# Patient Record
Sex: MALE | Race: OTHER | NOT HISPANIC OR LATINO | URBAN - METROPOLITAN AREA
[De-identification: names, ages, dates, MRNs, and addresses within clinical notes are randomized per-mention and may not be internally consistent; named-entity substitution may affect disease eponyms.]

---

## 2024-01-01 ENCOUNTER — INPATIENT (INPATIENT)
Facility: HOSPITAL | Age: 44
LOS: 12 days | End: 2024-05-10
Attending: INTERNAL MEDICINE | Admitting: INTERNAL MEDICINE
Payer: MEDICAID

## 2024-01-01 VITALS
RESPIRATION RATE: 24 BRPM | HEIGHT: 67 IN | HEART RATE: 94 BPM | SYSTOLIC BLOOD PRESSURE: 150 MMHG | WEIGHT: 203.05 LBS | OXYGEN SATURATION: 99 % | TEMPERATURE: 99 F | DIASTOLIC BLOOD PRESSURE: 60 MMHG

## 2024-01-01 VITALS — OXYGEN SATURATION: 95 % | RESPIRATION RATE: 22 BRPM | HEART RATE: 87 BPM

## 2024-01-01 DIAGNOSIS — N17.9 ACUTE KIDNEY FAILURE, UNSPECIFIED: ICD-10-CM

## 2024-01-01 DIAGNOSIS — E87.1 HYPO-OSMOLALITY AND HYPONATREMIA: ICD-10-CM

## 2024-01-01 DIAGNOSIS — Z92.81 PERSONAL HISTORY OF EXTRACORPOREAL MEMBRANE OXYGENATION (ECMO): ICD-10-CM

## 2024-01-01 DIAGNOSIS — K13.79 OTHER LESIONS OF ORAL MUCOSA: ICD-10-CM

## 2024-01-01 LAB
A1C WITH ESTIMATED AVERAGE GLUCOSE RESULT: 4.5 % — SIGNIFICANT CHANGE UP (ref 4–5.6)
ADD ON TEST-SPECIMEN IN LAB: SIGNIFICANT CHANGE UP
AFP-TM SERPL-MCNC: 4.3 NG/ML — SIGNIFICANT CHANGE UP
ALBUMIN SERPL ELPH-MCNC: 2.6 G/DL — LOW (ref 3.3–5)
ALBUMIN SERPL ELPH-MCNC: 2.9 G/DL — LOW (ref 3.3–5)
ALBUMIN SERPL ELPH-MCNC: 3 G/DL — LOW (ref 3.3–5)
ALBUMIN SERPL ELPH-MCNC: 3.1 G/DL — LOW (ref 3.3–5)
ALBUMIN SERPL ELPH-MCNC: 3.2 G/DL — LOW (ref 3.3–5)
ALBUMIN SERPL ELPH-MCNC: 3.3 G/DL — SIGNIFICANT CHANGE UP (ref 3.3–5)
ALBUMIN SERPL ELPH-MCNC: 3.4 G/DL — SIGNIFICANT CHANGE UP (ref 3.3–5)
ALBUMIN SERPL ELPH-MCNC: 3.5 G/DL — SIGNIFICANT CHANGE UP (ref 3.3–5)
ALBUMIN SERPL ELPH-MCNC: 3.6 G/DL — SIGNIFICANT CHANGE UP (ref 3.3–5)
ALBUMIN SERPL ELPH-MCNC: 3.7 G/DL — SIGNIFICANT CHANGE UP (ref 3.3–5)
ALBUMIN SERPL ELPH-MCNC: 3.8 G/DL — SIGNIFICANT CHANGE UP (ref 3.3–5)
ALBUMIN SERPL ELPH-MCNC: 3.8 G/DL — SIGNIFICANT CHANGE UP (ref 3.3–5)
ALBUMIN SERPL ELPH-MCNC: 3.9 G/DL — SIGNIFICANT CHANGE UP (ref 3.3–5)
ALBUMIN SERPL ELPH-MCNC: 4 G/DL — SIGNIFICANT CHANGE UP (ref 3.3–5)
ALBUMIN SERPL ELPH-MCNC: 4.1 G/DL — SIGNIFICANT CHANGE UP (ref 3.3–5)
ALP SERPL-CCNC: 101 U/L — SIGNIFICANT CHANGE UP (ref 40–120)
ALP SERPL-CCNC: 28 U/L — LOW (ref 40–120)
ALP SERPL-CCNC: 29 U/L — LOW (ref 40–120)
ALP SERPL-CCNC: 31 U/L — LOW (ref 40–120)
ALP SERPL-CCNC: 32 U/L — LOW (ref 40–120)
ALP SERPL-CCNC: 35 U/L — LOW (ref 40–120)
ALP SERPL-CCNC: 38 U/L — LOW (ref 40–120)
ALP SERPL-CCNC: 39 U/L — LOW (ref 40–120)
ALP SERPL-CCNC: 41 U/L — SIGNIFICANT CHANGE UP (ref 40–120)
ALP SERPL-CCNC: 41 U/L — SIGNIFICANT CHANGE UP (ref 40–120)
ALP SERPL-CCNC: 43 U/L — SIGNIFICANT CHANGE UP (ref 40–120)
ALP SERPL-CCNC: 45 U/L — SIGNIFICANT CHANGE UP (ref 40–120)
ALP SERPL-CCNC: 46 U/L — SIGNIFICANT CHANGE UP (ref 40–120)
ALP SERPL-CCNC: 51 U/L — SIGNIFICANT CHANGE UP (ref 40–120)
ALP SERPL-CCNC: 55 U/L — SIGNIFICANT CHANGE UP (ref 40–120)
ALP SERPL-CCNC: 58 U/L — SIGNIFICANT CHANGE UP (ref 40–120)
ALP SERPL-CCNC: 59 U/L — SIGNIFICANT CHANGE UP (ref 40–120)
ALP SERPL-CCNC: 59 U/L — SIGNIFICANT CHANGE UP (ref 40–120)
ALP SERPL-CCNC: 60 U/L — SIGNIFICANT CHANGE UP (ref 40–120)
ALP SERPL-CCNC: 63 U/L — SIGNIFICANT CHANGE UP (ref 40–120)
ALP SERPL-CCNC: 63 U/L — SIGNIFICANT CHANGE UP (ref 40–120)
ALP SERPL-CCNC: 64 U/L — SIGNIFICANT CHANGE UP (ref 40–120)
ALP SERPL-CCNC: 65 U/L — SIGNIFICANT CHANGE UP (ref 40–120)
ALP SERPL-CCNC: 67 U/L — SIGNIFICANT CHANGE UP (ref 40–120)
ALP SERPL-CCNC: 68 U/L — SIGNIFICANT CHANGE UP (ref 40–120)
ALP SERPL-CCNC: 69 U/L — SIGNIFICANT CHANGE UP (ref 40–120)
ALP SERPL-CCNC: 70 U/L — SIGNIFICANT CHANGE UP (ref 40–120)
ALP SERPL-CCNC: 70 U/L — SIGNIFICANT CHANGE UP (ref 40–120)
ALP SERPL-CCNC: 72 U/L — SIGNIFICANT CHANGE UP (ref 40–120)
ALP SERPL-CCNC: 73 U/L — SIGNIFICANT CHANGE UP (ref 40–120)
ALP SERPL-CCNC: 74 U/L — SIGNIFICANT CHANGE UP (ref 40–120)
ALP SERPL-CCNC: 74 U/L — SIGNIFICANT CHANGE UP (ref 40–120)
ALP SERPL-CCNC: 75 U/L — SIGNIFICANT CHANGE UP (ref 40–120)
ALP SERPL-CCNC: 75 U/L — SIGNIFICANT CHANGE UP (ref 40–120)
ALP SERPL-CCNC: 76 U/L — SIGNIFICANT CHANGE UP (ref 40–120)
ALP SERPL-CCNC: 76 U/L — SIGNIFICANT CHANGE UP (ref 40–120)
ALP SERPL-CCNC: 77 U/L — SIGNIFICANT CHANGE UP (ref 40–120)
ALP SERPL-CCNC: 89 U/L — SIGNIFICANT CHANGE UP (ref 40–120)
ALP SERPL-CCNC: 93 U/L — SIGNIFICANT CHANGE UP (ref 40–120)
ALT FLD-CCNC: 100 U/L — HIGH (ref 4–41)
ALT FLD-CCNC: 100 U/L — HIGH (ref 4–41)
ALT FLD-CCNC: 101 U/L — HIGH (ref 4–41)
ALT FLD-CCNC: 101 U/L — HIGH (ref 4–41)
ALT FLD-CCNC: 102 U/L — HIGH (ref 4–41)
ALT FLD-CCNC: 102 U/L — HIGH (ref 4–41)
ALT FLD-CCNC: 104 U/L — HIGH (ref 4–41)
ALT FLD-CCNC: 104 U/L — HIGH (ref 4–41)
ALT FLD-CCNC: 106 U/L — HIGH (ref 4–41)
ALT FLD-CCNC: 107 U/L — HIGH (ref 4–41)
ALT FLD-CCNC: 115 U/L — HIGH (ref 4–41)
ALT FLD-CCNC: 116 U/L — HIGH (ref 4–41)
ALT FLD-CCNC: 117 U/L — HIGH (ref 4–41)
ALT FLD-CCNC: 121 U/L — HIGH (ref 4–41)
ALT FLD-CCNC: 122 U/L — HIGH (ref 4–41)
ALT FLD-CCNC: 123 U/L — HIGH (ref 4–41)
ALT FLD-CCNC: 125 U/L — HIGH (ref 4–41)
ALT FLD-CCNC: 126 U/L — HIGH (ref 4–41)
ALT FLD-CCNC: 127 U/L — HIGH (ref 4–41)
ALT FLD-CCNC: 130 U/L — HIGH (ref 4–41)
ALT FLD-CCNC: 134 U/L — HIGH (ref 4–41)
ALT FLD-CCNC: 135 U/L — HIGH (ref 4–41)
ALT FLD-CCNC: 32 U/L — SIGNIFICANT CHANGE UP (ref 4–41)
ALT FLD-CCNC: 34 U/L — SIGNIFICANT CHANGE UP (ref 4–41)
ALT FLD-CCNC: 35 U/L — SIGNIFICANT CHANGE UP (ref 4–41)
ALT FLD-CCNC: 37 U/L — SIGNIFICANT CHANGE UP (ref 4–41)
ALT FLD-CCNC: 38 U/L — SIGNIFICANT CHANGE UP (ref 4–41)
ALT FLD-CCNC: 38 U/L — SIGNIFICANT CHANGE UP (ref 4–41)
ALT FLD-CCNC: 39 U/L — SIGNIFICANT CHANGE UP (ref 4–41)
ALT FLD-CCNC: 40 U/L — SIGNIFICANT CHANGE UP (ref 4–41)
ALT FLD-CCNC: 41 U/L — SIGNIFICANT CHANGE UP (ref 4–41)
ALT FLD-CCNC: 42 U/L — HIGH (ref 4–41)
ALT FLD-CCNC: 42 U/L — HIGH (ref 4–41)
ALT FLD-CCNC: 43 U/L — HIGH (ref 4–41)
ALT FLD-CCNC: 47 U/L — HIGH (ref 4–41)
ALT FLD-CCNC: 50 U/L — HIGH (ref 4–41)
ALT FLD-CCNC: 55 U/L — HIGH (ref 4–41)
ALT FLD-CCNC: 59 U/L — HIGH (ref 4–41)
ALT FLD-CCNC: 63 U/L — HIGH (ref 4–41)
ALT FLD-CCNC: 71 U/L — HIGH (ref 4–41)
ALT FLD-CCNC: 76 U/L — HIGH (ref 4–41)
ALT FLD-CCNC: 86 U/L — HIGH (ref 4–41)
ALT FLD-CCNC: 96 U/L — HIGH (ref 4–41)
ALT FLD-CCNC: 99 U/L — HIGH (ref 4–41)
ALT FLD-CCNC: 99 U/L — HIGH (ref 4–41)
AMMONIA BLD-MCNC: 105 UMOL/L — HIGH (ref 11–55)
AMMONIA BLD-MCNC: 111 UMOL/L — HIGH (ref 11–55)
AMMONIA BLD-MCNC: 48 UMOL/L — SIGNIFICANT CHANGE UP (ref 11–55)
AMMONIA BLD-MCNC: 94 UMOL/L — HIGH (ref 11–55)
AMPHET UR-MCNC: NEGATIVE — SIGNIFICANT CHANGE UP
AMYLASE P1 CFR SERPL: 27 U/L — SIGNIFICANT CHANGE UP (ref 25–125)
AMYLASE P1 CFR SERPL: 28 U/L — SIGNIFICANT CHANGE UP (ref 25–125)
ANA TITR SER: NEGATIVE — SIGNIFICANT CHANGE UP
ANION GAP SERPL CALC-SCNC: 10 MMOL/L — SIGNIFICANT CHANGE UP (ref 7–14)
ANION GAP SERPL CALC-SCNC: 12 MMOL/L — SIGNIFICANT CHANGE UP (ref 7–14)
ANION GAP SERPL CALC-SCNC: 13 MMOL/L — SIGNIFICANT CHANGE UP (ref 7–14)
ANION GAP SERPL CALC-SCNC: 14 MMOL/L — SIGNIFICANT CHANGE UP (ref 7–14)
ANION GAP SERPL CALC-SCNC: 15 MMOL/L — HIGH (ref 7–14)
ANION GAP SERPL CALC-SCNC: 16 MMOL/L — HIGH (ref 7–14)
ANION GAP SERPL CALC-SCNC: 17 MMOL/L — HIGH (ref 7–14)
ANION GAP SERPL CALC-SCNC: 18 MMOL/L — HIGH (ref 7–14)
ANION GAP SERPL CALC-SCNC: 18 MMOL/L — HIGH (ref 7–14)
ANION GAP SERPL CALC-SCNC: 19 MMOL/L — HIGH (ref 7–14)
ANION GAP SERPL CALC-SCNC: 19 MMOL/L — HIGH (ref 7–14)
ANION GAP SERPL CALC-SCNC: 20 MMOL/L — HIGH (ref 7–14)
ANION GAP SERPL CALC-SCNC: 21 MMOL/L — HIGH (ref 7–14)
ANION GAP SERPL CALC-SCNC: 23 MMOL/L — HIGH (ref 7–14)
ANION GAP SERPL CALC-SCNC: 23 MMOL/L — HIGH (ref 7–14)
ANISOCYTOSIS BLD QL: SIGNIFICANT CHANGE UP
APAP SERPL-MCNC: <10 UG/ML — LOW (ref 15–25)
APPEARANCE UR: ABNORMAL
APPEARANCE UR: ABNORMAL
APTT BLD: 31.5 SEC — SIGNIFICANT CHANGE UP (ref 24.5–35.6)
APTT BLD: 33.8 SEC — SIGNIFICANT CHANGE UP (ref 24.5–35.6)
APTT BLD: 34.7 SEC — SIGNIFICANT CHANGE UP (ref 24.5–35.6)
APTT BLD: 35.1 SEC — SIGNIFICANT CHANGE UP (ref 24.5–35.6)
APTT BLD: 35.2 SEC — SIGNIFICANT CHANGE UP (ref 24.5–35.6)
APTT BLD: 35.6 SEC — SIGNIFICANT CHANGE UP (ref 24.5–35.6)
APTT BLD: 36 SEC — HIGH (ref 24.5–35.6)
APTT BLD: 36.4 SEC — HIGH (ref 24.5–35.6)
APTT BLD: 36.4 SEC — HIGH (ref 24.5–35.6)
APTT BLD: 36.8 SEC — HIGH (ref 24.5–35.6)
APTT BLD: 37.4 SEC — HIGH (ref 24.5–35.6)
APTT BLD: 37.5 SEC — HIGH (ref 24.5–35.6)
APTT BLD: 37.7 SEC — HIGH (ref 24.5–35.6)
APTT BLD: 38.3 SEC — HIGH (ref 24.5–35.6)
APTT BLD: 38.3 SEC — HIGH (ref 24.5–35.6)
APTT BLD: 39.3 SEC — HIGH (ref 24.5–35.6)
APTT BLD: 39.7 SEC — HIGH (ref 24.5–35.6)
APTT BLD: 39.8 SEC — HIGH (ref 24.5–35.6)
APTT BLD: 39.9 SEC — HIGH (ref 24.5–35.6)
APTT BLD: 40.1 SEC — HIGH (ref 24.5–35.6)
APTT BLD: 40.6 SEC — HIGH (ref 24.5–35.6)
APTT BLD: 41.1 SEC — HIGH (ref 24.5–35.6)
APTT BLD: 41.3 SEC — HIGH (ref 24.5–35.6)
APTT BLD: 42.3 SEC — HIGH (ref 24.5–35.6)
APTT BLD: 43.2 SEC — HIGH (ref 24.5–35.6)
APTT BLD: 43.4 SEC — HIGH (ref 24.5–35.6)
APTT BLD: 45.2 SEC — HIGH (ref 24.5–35.6)
APTT BLD: 45.3 SEC — HIGH (ref 24.5–35.6)
APTT BLD: 45.9 SEC — HIGH (ref 24.5–35.6)
APTT BLD: 47.2 SEC — HIGH (ref 24.5–35.6)
APTT BLD: 48.9 SEC — HIGH (ref 24.5–35.6)
APTT BLD: 49 SEC — HIGH (ref 24.5–35.6)
APTT BLD: 49 SEC — HIGH (ref 24.5–35.6)
APTT BLD: 51.6 SEC — HIGH (ref 24.5–35.6)
APTT BLD: 52.6 SEC — HIGH (ref 24.5–35.6)
APTT BLD: 54 SEC — HIGH (ref 24.5–35.6)
APTT BLD: 57.5 SEC — HIGH (ref 24.5–35.6)
APTT BLD: 60.5 SEC — HIGH (ref 24.5–35.6)
AST SERPL-CCNC: 123 U/L — HIGH (ref 4–40)
AST SERPL-CCNC: 124 U/L — HIGH (ref 4–40)
AST SERPL-CCNC: 126 U/L — HIGH (ref 4–40)
AST SERPL-CCNC: 127 U/L — HIGH (ref 4–40)
AST SERPL-CCNC: 128 U/L — HIGH (ref 4–40)
AST SERPL-CCNC: 130 U/L — HIGH (ref 4–40)
AST SERPL-CCNC: 130 U/L — HIGH (ref 4–40)
AST SERPL-CCNC: 133 U/L — HIGH (ref 4–40)
AST SERPL-CCNC: 133 U/L — HIGH (ref 4–40)
AST SERPL-CCNC: 135 U/L — HIGH (ref 4–40)
AST SERPL-CCNC: 136 U/L — HIGH (ref 4–40)
AST SERPL-CCNC: 141 U/L — HIGH (ref 4–40)
AST SERPL-CCNC: 142 U/L — HIGH (ref 4–40)
AST SERPL-CCNC: 143 U/L — HIGH (ref 4–40)
AST SERPL-CCNC: 145 U/L — HIGH (ref 4–40)
AST SERPL-CCNC: 152 U/L — HIGH (ref 4–40)
AST SERPL-CCNC: 154 U/L — HIGH (ref 4–40)
AST SERPL-CCNC: 156 U/L — HIGH (ref 4–40)
AST SERPL-CCNC: 159 U/L — HIGH (ref 4–40)
AST SERPL-CCNC: 160 U/L — HIGH (ref 4–40)
AST SERPL-CCNC: 164 U/L — HIGH (ref 4–40)
AST SERPL-CCNC: 165 U/L — HIGH (ref 4–40)
AST SERPL-CCNC: 168 U/L — HIGH (ref 4–40)
AST SERPL-CCNC: 172 U/L — HIGH (ref 4–40)
AST SERPL-CCNC: 173 U/L — HIGH (ref 4–40)
AST SERPL-CCNC: 175 U/L — HIGH (ref 4–40)
AST SERPL-CCNC: 179 U/L — HIGH (ref 4–40)
AST SERPL-CCNC: 181 U/L — HIGH (ref 4–40)
AST SERPL-CCNC: 183 U/L — HIGH (ref 4–40)
AST SERPL-CCNC: 193 U/L — HIGH (ref 4–40)
AST SERPL-CCNC: 194 U/L — HIGH (ref 4–40)
AST SERPL-CCNC: 199 U/L — HIGH (ref 4–40)
AST SERPL-CCNC: 203 U/L — HIGH (ref 4–40)
AST SERPL-CCNC: 206 U/L — HIGH (ref 4–40)
AST SERPL-CCNC: 220 U/L — HIGH (ref 4–40)
AST SERPL-CCNC: 235 U/L — HIGH (ref 4–40)
AST SERPL-CCNC: 257 U/L — HIGH (ref 4–40)
AST SERPL-CCNC: 274 U/L — HIGH (ref 4–40)
AST SERPL-CCNC: 320 U/L — HIGH (ref 4–40)
AST SERPL-CCNC: 326 U/L — HIGH (ref 4–40)
AST SERPL-CCNC: 343 U/L — HIGH (ref 4–40)
AST SERPL-CCNC: 353 U/L — HIGH (ref 4–40)
AST SERPL-CCNC: 358 U/L — HIGH (ref 4–40)
AST SERPL-CCNC: 377 U/L — HIGH (ref 4–40)
AST SERPL-CCNC: 381 U/L — HIGH (ref 4–40)
AST SERPL-CCNC: 388 U/L — HIGH (ref 4–40)
AST SERPL-CCNC: 401 U/L — HIGH (ref 4–40)
AST SERPL-CCNC: 408 U/L — HIGH (ref 4–40)
AUTO DIFF PNL BLD: ABNORMAL
B PERT DNA SPEC QL NAA+PROBE: SIGNIFICANT CHANGE UP
B PERT IGG+IGM PNL SER: ABNORMAL
B PERT+PARAPERT DNA PNL SPEC NAA+PROBE: SIGNIFICANT CHANGE UP
B19V DNA FLD QL NAA+PROBE: SIGNIFICANT CHANGE UP IU/ML
B19V IGG SER-ACNC: 1.13 INDEX — HIGH (ref 0–0.9)
B19V IGG+IGM SER-IMP: POSITIVE
B19V IGG+IGM SER-IMP: SIGNIFICANT CHANGE UP
B19V IGM FLD-ACNC: 0.1 INDEX — SIGNIFICANT CHANGE UP (ref 0–0.9)
B19V IGM SER-ACNC: NEGATIVE — SIGNIFICANT CHANGE UP
B2 GLYCOPROT1 AB SER QL: NEGATIVE — SIGNIFICANT CHANGE UP
BACTERIA # UR AUTO: NEGATIVE /HPF — SIGNIFICANT CHANGE UP
BACTERIA # UR AUTO: NEGATIVE /HPF — SIGNIFICANT CHANGE UP
BARBITURATES UR SCN-MCNC: NEGATIVE — SIGNIFICANT CHANGE UP
BASOPHILS # BLD AUTO: 0 K/UL — SIGNIFICANT CHANGE UP (ref 0–0.2)
BASOPHILS # BLD AUTO: 0 K/UL — SIGNIFICANT CHANGE UP (ref 0–0.2)
BASOPHILS # BLD AUTO: 0.01 K/UL — SIGNIFICANT CHANGE UP (ref 0–0.2)
BASOPHILS # BLD AUTO: 0.02 K/UL — SIGNIFICANT CHANGE UP (ref 0–0.2)
BASOPHILS # BLD AUTO: 0.03 K/UL — SIGNIFICANT CHANGE UP (ref 0–0.2)
BASOPHILS # BLD AUTO: 0.04 K/UL — SIGNIFICANT CHANGE UP (ref 0–0.2)
BASOPHILS # BLD AUTO: 0.05 K/UL — SIGNIFICANT CHANGE UP (ref 0–0.2)
BASOPHILS # BLD AUTO: 0.06 K/UL — SIGNIFICANT CHANGE UP (ref 0–0.2)
BASOPHILS # BLD AUTO: 0.06 K/UL — SIGNIFICANT CHANGE UP (ref 0–0.2)
BASOPHILS # BLD AUTO: 0.08 K/UL — SIGNIFICANT CHANGE UP (ref 0–0.2)
BASOPHILS # BLD AUTO: 0.09 K/UL — SIGNIFICANT CHANGE UP (ref 0–0.2)
BASOPHILS # BLD AUTO: 0.09 K/UL — SIGNIFICANT CHANGE UP (ref 0–0.2)
BASOPHILS # BLD AUTO: 0.1 K/UL — SIGNIFICANT CHANGE UP (ref 0–0.2)
BASOPHILS # BLD AUTO: 0.11 K/UL — SIGNIFICANT CHANGE UP (ref 0–0.2)
BASOPHILS # BLD AUTO: 0.38 K/UL — HIGH (ref 0–0.2)
BASOPHILS NFR BLD AUTO: 0 % — SIGNIFICANT CHANGE UP (ref 0–2)
BASOPHILS NFR BLD AUTO: 0 % — SIGNIFICANT CHANGE UP (ref 0–2)
BASOPHILS NFR BLD AUTO: 0.1 % — SIGNIFICANT CHANGE UP (ref 0–2)
BASOPHILS NFR BLD AUTO: 0.2 % — SIGNIFICANT CHANGE UP (ref 0–2)
BASOPHILS NFR BLD AUTO: 0.3 % — SIGNIFICANT CHANGE UP (ref 0–2)
BASOPHILS NFR BLD AUTO: 0.4 % — SIGNIFICANT CHANGE UP (ref 0–2)
BASOPHILS NFR BLD AUTO: 0.5 % — SIGNIFICANT CHANGE UP (ref 0–2)
BASOPHILS NFR BLD AUTO: 0.6 % — SIGNIFICANT CHANGE UP (ref 0–2)
BASOPHILS NFR BLD AUTO: 0.6 % — SIGNIFICANT CHANGE UP (ref 0–2)
BASOPHILS NFR BLD AUTO: 0.7 % — SIGNIFICANT CHANGE UP (ref 0–2)
BASOPHILS NFR BLD AUTO: 0.7 % — SIGNIFICANT CHANGE UP (ref 0–2)
BASOPHILS NFR BLD AUTO: 0.9 % — SIGNIFICANT CHANGE UP (ref 0–2)
BASOPHILS NFR BLD AUTO: 1.7 % — SIGNIFICANT CHANGE UP (ref 0–2)
BENZODIAZ UR-MCNC: NEGATIVE — SIGNIFICANT CHANGE UP
BILIRUB DIRECT SERPL-MCNC: 11.4 MG/DL — HIGH (ref 0–0.3)
BILIRUB INDIRECT FLD-MCNC: 3.5 MG/DL — HIGH (ref 0–1)
BILIRUB SERPL-MCNC: 14.9 MG/DL — HIGH (ref 0.2–1.2)
BILIRUB SERPL-MCNC: 14.9 MG/DL — HIGH (ref 0.2–1.2)
BILIRUB SERPL-MCNC: 15.4 MG/DL — HIGH (ref 0.2–1.2)
BILIRUB SERPL-MCNC: 15.4 MG/DL — HIGH (ref 0.2–1.2)
BILIRUB SERPL-MCNC: 17.3 MG/DL — HIGH (ref 0.2–1.2)
BILIRUB SERPL-MCNC: 18.4 MG/DL — HIGH (ref 0.2–1.2)
BILIRUB SERPL-MCNC: 18.8 MG/DL — HIGH (ref 0.2–1.2)
BILIRUB SERPL-MCNC: 20.3 MG/DL — HIGH (ref 0.2–1.2)
BILIRUB SERPL-MCNC: 20.4 MG/DL — HIGH (ref 0.2–1.2)
BILIRUB SERPL-MCNC: 21 MG/DL — HIGH (ref 0.2–1.2)
BILIRUB SERPL-MCNC: 21.6 MG/DL — HIGH (ref 0.2–1.2)
BILIRUB SERPL-MCNC: 22 MG/DL — HIGH (ref 0.2–1.2)
BILIRUB SERPL-MCNC: 22.7 MG/DL — HIGH (ref 0.2–1.2)
BILIRUB SERPL-MCNC: 22.8 MG/DL — HIGH (ref 0.2–1.2)
BILIRUB SERPL-MCNC: 22.9 MG/DL — HIGH (ref 0.2–1.2)
BILIRUB SERPL-MCNC: 23.1 MG/DL — HIGH (ref 0.2–1.2)
BILIRUB SERPL-MCNC: 23.3 MG/DL — HIGH (ref 0.2–1.2)
BILIRUB SERPL-MCNC: 23.9 MG/DL — HIGH (ref 0.2–1.2)
BILIRUB SERPL-MCNC: 24.6 MG/DL — HIGH (ref 0.2–1.2)
BILIRUB SERPL-MCNC: 25 MG/DL — HIGH (ref 0.2–1.2)
BILIRUB SERPL-MCNC: 25 MG/DL — HIGH (ref 0.2–1.2)
BILIRUB SERPL-MCNC: 25.1 MG/DL — HIGH (ref 0.2–1.2)
BILIRUB SERPL-MCNC: 26.3 MG/DL — HIGH (ref 0.2–1.2)
BILIRUB SERPL-MCNC: 26.3 MG/DL — HIGH (ref 0.2–1.2)
BILIRUB SERPL-MCNC: 26.8 MG/DL — HIGH (ref 0.2–1.2)
BILIRUB SERPL-MCNC: 27.4 MG/DL — HIGH (ref 0.2–1.2)
BILIRUB SERPL-MCNC: 27.6 MG/DL — HIGH (ref 0.2–1.2)
BILIRUB SERPL-MCNC: 28.1 MG/DL — HIGH (ref 0.2–1.2)
BILIRUB SERPL-MCNC: 28.1 MG/DL — HIGH (ref 0.2–1.2)
BILIRUB SERPL-MCNC: 28.5 MG/DL — HIGH (ref 0.2–1.2)
BILIRUB SERPL-MCNC: 28.6 MG/DL — HIGH (ref 0.2–1.2)
BILIRUB SERPL-MCNC: 28.8 MG/DL — HIGH (ref 0.2–1.2)
BILIRUB SERPL-MCNC: 29.1 MG/DL — HIGH (ref 0.2–1.2)
BILIRUB SERPL-MCNC: 31 MG/DL — HIGH (ref 0.2–1.2)
BILIRUB SERPL-MCNC: 31.3 MG/DL — HIGH (ref 0.2–1.2)
BILIRUB SERPL-MCNC: 31.8 MG/DL — HIGH (ref 0.2–1.2)
BILIRUB SERPL-MCNC: 32.2 MG/DL — HIGH (ref 0.2–1.2)
BILIRUB SERPL-MCNC: 32.8 MG/DL — HIGH (ref 0.2–1.2)
BILIRUB SERPL-MCNC: 33.2 MG/DL — HIGH (ref 0.2–1.2)
BILIRUB SERPL-MCNC: 33.4 MG/DL — HIGH (ref 0.2–1.2)
BILIRUB SERPL-MCNC: 33.8 MG/DL — HIGH (ref 0.2–1.2)
BILIRUB SERPL-MCNC: 33.9 MG/DL — HIGH (ref 0.2–1.2)
BILIRUB SERPL-MCNC: 34.2 MG/DL — HIGH (ref 0.2–1.2)
BILIRUB SERPL-MCNC: 35.4 MG/DL — HIGH (ref 0.2–1.2)
BILIRUB SERPL-MCNC: 36.2 MG/DL — HIGH (ref 0.2–1.2)
BILIRUB SERPL-MCNC: 36.7 MG/DL — HIGH (ref 0.2–1.2)
BILIRUB SERPL-MCNC: 37.8 MG/DL — HIGH (ref 0.2–1.2)
BILIRUB SERPL-MCNC: 38.7 MG/DL — HIGH (ref 0.2–1.2)
BILIRUB SERPL-MCNC: 38.9 MG/DL — HIGH (ref 0.2–1.2)
BILIRUB UR-MCNC: ABNORMAL
BILIRUB UR-MCNC: ABNORMAL
BLD GP AB SCN SERPL QL: NEGATIVE — SIGNIFICANT CHANGE UP
BLOOD GAS ARTERIAL - LYTES,HGB,ICA,LACT RESULT: SIGNIFICANT CHANGE UP
BLOOD GAS ARTERIAL COMPREHENSIVE RESULT: SIGNIFICANT CHANGE UP
BORDETELLA PARAPERTUSSIS (RAPRVP): SIGNIFICANT CHANGE UP
BUN SERPL-MCNC: 10 MG/DL — SIGNIFICANT CHANGE UP (ref 7–23)
BUN SERPL-MCNC: 11 MG/DL — SIGNIFICANT CHANGE UP (ref 7–23)
BUN SERPL-MCNC: 11 MG/DL — SIGNIFICANT CHANGE UP (ref 7–23)
BUN SERPL-MCNC: 13 MG/DL — SIGNIFICANT CHANGE UP (ref 7–23)
BUN SERPL-MCNC: 13 MG/DL — SIGNIFICANT CHANGE UP (ref 7–23)
BUN SERPL-MCNC: 16 MG/DL — SIGNIFICANT CHANGE UP (ref 7–23)
BUN SERPL-MCNC: 16 MG/DL — SIGNIFICANT CHANGE UP (ref 7–23)
BUN SERPL-MCNC: 17 MG/DL — SIGNIFICANT CHANGE UP (ref 7–23)
BUN SERPL-MCNC: 17 MG/DL — SIGNIFICANT CHANGE UP (ref 7–23)
BUN SERPL-MCNC: 18 MG/DL — SIGNIFICANT CHANGE UP (ref 7–23)
BUN SERPL-MCNC: 19 MG/DL — SIGNIFICANT CHANGE UP (ref 7–23)
BUN SERPL-MCNC: 20 MG/DL — SIGNIFICANT CHANGE UP (ref 7–23)
BUN SERPL-MCNC: 21 MG/DL — SIGNIFICANT CHANGE UP (ref 7–23)
BUN SERPL-MCNC: 22 MG/DL — SIGNIFICANT CHANGE UP (ref 7–23)
BUN SERPL-MCNC: 22 MG/DL — SIGNIFICANT CHANGE UP (ref 7–23)
BUN SERPL-MCNC: 23 MG/DL — SIGNIFICANT CHANGE UP (ref 7–23)
BUN SERPL-MCNC: 24 MG/DL — HIGH (ref 7–23)
BUN SERPL-MCNC: 25 MG/DL — HIGH (ref 7–23)
BUN SERPL-MCNC: 26 MG/DL — HIGH (ref 7–23)
BUN SERPL-MCNC: 26 MG/DL — HIGH (ref 7–23)
BUN SERPL-MCNC: 28 MG/DL — HIGH (ref 7–23)
BUN SERPL-MCNC: 30 MG/DL — HIGH (ref 7–23)
BUN SERPL-MCNC: 34 MG/DL — HIGH (ref 7–23)
BUN SERPL-MCNC: 44 MG/DL — HIGH (ref 7–23)
BUN SERPL-MCNC: 46 MG/DL — HIGH (ref 7–23)
BUN SERPL-MCNC: 54 MG/DL — HIGH (ref 7–23)
BUN SERPL-MCNC: 55 MG/DL — HIGH (ref 7–23)
BUN SERPL-MCNC: 59 MG/DL — HIGH (ref 7–23)
BUN SERPL-MCNC: 60 MG/DL — HIGH (ref 7–23)
BUN SERPL-MCNC: 62 MG/DL — HIGH (ref 7–23)
BUN SERPL-MCNC: 65 MG/DL — HIGH (ref 7–23)
BUN SERPL-MCNC: 66 MG/DL — HIGH (ref 7–23)
BUN SERPL-MCNC: 67 MG/DL — HIGH (ref 7–23)
BUN SERPL-MCNC: 70 MG/DL — HIGH (ref 7–23)
BUN SERPL-MCNC: 76 MG/DL — HIGH (ref 7–23)
BUN SERPL-MCNC: 8 MG/DL — SIGNIFICANT CHANGE UP (ref 7–23)
BUN SERPL-MCNC: 8 MG/DL — SIGNIFICANT CHANGE UP (ref 7–23)
BUN SERPL-MCNC: 9 MG/DL — SIGNIFICANT CHANGE UP (ref 7–23)
BURR CELLS BLD QL SMEAR: PRESENT — SIGNIFICANT CHANGE UP
C PNEUM DNA SPEC QL NAA+PROBE: SIGNIFICANT CHANGE UP
C-ANCA SER-ACNC: NEGATIVE — SIGNIFICANT CHANGE UP
C3 SERPL-MCNC: 66 MG/DL — LOW (ref 90–180)
C4 SERPL-MCNC: 10 MG/DL — SIGNIFICANT CHANGE UP (ref 10–40)
C4 SERPL-MCNC: 12 MG/DL — SIGNIFICANT CHANGE UP (ref 10–40)
C4 SERPL-MCNC: 6 MG/DL — LOW (ref 10–40)
C4 SERPL-MCNC: 9 MG/DL — LOW (ref 10–40)
C4 SERPL-MCNC: 9 MG/DL — LOW (ref 10–40)
CA-I BLD-SCNC: 1.08 MMOL/L — LOW (ref 1.15–1.29)
CA-I BLD-SCNC: 1.1 MMOL/L — LOW (ref 1.15–1.29)
CA-I BLD-SCNC: 1.11 MMOL/L — LOW (ref 1.15–1.29)
CA-I BLD-SCNC: 1.12 MMOL/L — LOW (ref 1.15–1.29)
CA-I BLD-SCNC: 1.12 MMOL/L — LOW (ref 1.15–1.29)
CA-I BLD-SCNC: 1.13 MMOL/L — LOW (ref 1.15–1.29)
CA-I BLD-SCNC: 1.14 MMOL/L — LOW (ref 1.15–1.29)
CA-I BLD-SCNC: 1.15 MMOL/L — SIGNIFICANT CHANGE UP (ref 1.15–1.29)
CA-I BLD-SCNC: 1.15 MMOL/L — SIGNIFICANT CHANGE UP (ref 1.15–1.29)
CA-I BLD-SCNC: 1.16 MMOL/L — SIGNIFICANT CHANGE UP (ref 1.15–1.29)
CA-I BLD-SCNC: 1.17 MMOL/L — SIGNIFICANT CHANGE UP (ref 1.15–1.29)
CA-I BLD-SCNC: 1.19 MMOL/L — SIGNIFICANT CHANGE UP (ref 1.15–1.29)
CA-I BLD-SCNC: 1.2 MMOL/L — SIGNIFICANT CHANGE UP (ref 1.15–1.29)
CA-I BLD-SCNC: 1.22 MMOL/L — SIGNIFICANT CHANGE UP (ref 1.15–1.29)
CA-I BLD-SCNC: 1.23 MMOL/L — SIGNIFICANT CHANGE UP (ref 1.15–1.29)
CA-I BLD-SCNC: 1.24 MMOL/L — SIGNIFICANT CHANGE UP (ref 1.15–1.29)
CA-I BLD-SCNC: 1.29 MMOL/L — SIGNIFICANT CHANGE UP (ref 1.15–1.29)
CALCIUM SERPL-MCNC: 7.8 MG/DL — LOW (ref 8.4–10.5)
CALCIUM SERPL-MCNC: 8 MG/DL — LOW (ref 8.4–10.5)
CALCIUM SERPL-MCNC: 8.1 MG/DL — LOW (ref 8.4–10.5)
CALCIUM SERPL-MCNC: 8.2 MG/DL — LOW (ref 8.4–10.5)
CALCIUM SERPL-MCNC: 8.3 MG/DL — LOW (ref 8.4–10.5)
CALCIUM SERPL-MCNC: 8.4 MG/DL — SIGNIFICANT CHANGE UP (ref 8.4–10.5)
CALCIUM SERPL-MCNC: 8.4 MG/DL — SIGNIFICANT CHANGE UP (ref 8.4–10.5)
CALCIUM SERPL-MCNC: 8.5 MG/DL — SIGNIFICANT CHANGE UP (ref 8.4–10.5)
CALCIUM SERPL-MCNC: 8.6 MG/DL — SIGNIFICANT CHANGE UP (ref 8.4–10.5)
CALCIUM SERPL-MCNC: 8.7 MG/DL — SIGNIFICANT CHANGE UP (ref 8.4–10.5)
CALCIUM SERPL-MCNC: 8.8 MG/DL — SIGNIFICANT CHANGE UP (ref 8.4–10.5)
CALCIUM SERPL-MCNC: 8.9 MG/DL — SIGNIFICANT CHANGE UP (ref 8.4–10.5)
CALCIUM SERPL-MCNC: 9 MG/DL — SIGNIFICANT CHANGE UP (ref 8.4–10.5)
CALCIUM SERPL-MCNC: 9 MG/DL — SIGNIFICANT CHANGE UP (ref 8.4–10.5)
CALCIUM SERPL-MCNC: 9.2 MG/DL — SIGNIFICANT CHANGE UP (ref 8.4–10.5)
CALCIUM SERPL-MCNC: 9.3 MG/DL — SIGNIFICANT CHANGE UP (ref 8.4–10.5)
CALCIUM SERPL-MCNC: 9.3 MG/DL — SIGNIFICANT CHANGE UP (ref 8.4–10.5)
CALCIUM SERPL-MCNC: 9.4 MG/DL — SIGNIFICANT CHANGE UP (ref 8.4–10.5)
CANCER AG19-9 SERPL-ACNC: 2 U/ML — SIGNIFICANT CHANGE UP
CANCER AG19-9 SERPL-ACNC: 2 U/ML — SIGNIFICANT CHANGE UP
CANCER AG19-9 SERPL-ACNC: <2 U/ML — SIGNIFICANT CHANGE UP
CANCER AG19-9 SERPL-ACNC: <2 U/ML — SIGNIFICANT CHANGE UP
CAST: 10 /LPF — HIGH (ref 0–4)
CAST: 43 /LPF — HIGH (ref 0–4)
CCP IGG SERPL-ACNC: 9 UNITS — SIGNIFICANT CHANGE UP
CEA SERPL-MCNC: 9.5 NG/ML — HIGH (ref 1–3.8)
CERULOPLASMIN SERPL-MCNC: 16 MG/DL — SIGNIFICANT CHANGE UP (ref 15–30)
CHLORIDE SERPL-SCNC: 100 MMOL/L — SIGNIFICANT CHANGE UP (ref 98–107)
CHLORIDE SERPL-SCNC: 101 MMOL/L — SIGNIFICANT CHANGE UP (ref 98–107)
CHLORIDE SERPL-SCNC: 102 MMOL/L — SIGNIFICANT CHANGE UP (ref 98–107)
CHLORIDE SERPL-SCNC: 103 MMOL/L — SIGNIFICANT CHANGE UP (ref 98–107)
CHLORIDE SERPL-SCNC: 103 MMOL/L — SIGNIFICANT CHANGE UP (ref 98–107)
CHLORIDE SERPL-SCNC: 93 MMOL/L — LOW (ref 98–107)
CHLORIDE SERPL-SCNC: 93 MMOL/L — LOW (ref 98–107)
CHLORIDE SERPL-SCNC: 94 MMOL/L — LOW (ref 98–107)
CHLORIDE SERPL-SCNC: 95 MMOL/L — LOW (ref 98–107)
CHLORIDE SERPL-SCNC: 96 MMOL/L — LOW (ref 98–107)
CHLORIDE SERPL-SCNC: 97 MMOL/L — LOW (ref 98–107)
CHLORIDE SERPL-SCNC: 98 MMOL/L — SIGNIFICANT CHANGE UP (ref 98–107)
CHLORIDE SERPL-SCNC: 99 MMOL/L — SIGNIFICANT CHANGE UP (ref 98–107)
CK SERPL-CCNC: 130 U/L — SIGNIFICANT CHANGE UP (ref 30–200)
CK SERPL-CCNC: 161 U/L — SIGNIFICANT CHANGE UP (ref 30–200)
CMV DNA CSF QL NAA+PROBE: SIGNIFICANT CHANGE UP IU/ML
CMV DNA SPEC NAA+PROBE-LOG#: SIGNIFICANT CHANGE UP LOG10IU/ML
CMV IGG FLD QL: 1.8 U/ML — HIGH
CMV IGG SERPL-IMP: POSITIVE
CMV IGM FLD-ACNC: <8 AU/ML — SIGNIFICANT CHANGE UP
CMV IGM SERPL QL: NEGATIVE — SIGNIFICANT CHANGE UP
CO2 SERPL-SCNC: 16 MMOL/L — LOW (ref 22–31)
CO2 SERPL-SCNC: 17 MMOL/L — LOW (ref 22–31)
CO2 SERPL-SCNC: 18 MMOL/L — LOW (ref 22–31)
CO2 SERPL-SCNC: 19 MMOL/L — LOW (ref 22–31)
CO2 SERPL-SCNC: 20 MMOL/L — LOW (ref 22–31)
CO2 SERPL-SCNC: 21 MMOL/L — LOW (ref 22–31)
CO2 SERPL-SCNC: 22 MMOL/L — SIGNIFICANT CHANGE UP (ref 22–31)
CO2 SERPL-SCNC: 24 MMOL/L — SIGNIFICANT CHANGE UP (ref 22–31)
COCAINE METAB.OTHER UR-MCNC: NEGATIVE — SIGNIFICANT CHANGE UP
COLOR FLD: ABNORMAL
COLOR SPEC: SIGNIFICANT CHANGE UP
COLOR SPEC: SIGNIFICANT CHANGE UP
CORTIS AM PEAK SERPL-MCNC: 8.7 UG/DL — SIGNIFICANT CHANGE UP (ref 6–18.4)
CREAT ?TM UR-MCNC: 213 MG/DL — SIGNIFICANT CHANGE UP
CREAT SERPL-MCNC: 0.48 MG/DL — LOW (ref 0.5–1.3)
CREAT SERPL-MCNC: 0.51 MG/DL — SIGNIFICANT CHANGE UP (ref 0.5–1.3)
CREAT SERPL-MCNC: 0.52 MG/DL — SIGNIFICANT CHANGE UP (ref 0.5–1.3)
CREAT SERPL-MCNC: 0.52 MG/DL — SIGNIFICANT CHANGE UP (ref 0.5–1.3)
CREAT SERPL-MCNC: 0.56 MG/DL — SIGNIFICANT CHANGE UP (ref 0.5–1.3)
CREAT SERPL-MCNC: 0.61 MG/DL — SIGNIFICANT CHANGE UP (ref 0.5–1.3)
CREAT SERPL-MCNC: 0.63 MG/DL — SIGNIFICANT CHANGE UP (ref 0.5–1.3)
CREAT SERPL-MCNC: 0.7 MG/DL — SIGNIFICANT CHANGE UP (ref 0.5–1.3)
CREAT SERPL-MCNC: 0.7 MG/DL — SIGNIFICANT CHANGE UP (ref 0.5–1.3)
CREAT SERPL-MCNC: 0.71 MG/DL — SIGNIFICANT CHANGE UP (ref 0.5–1.3)
CREAT SERPL-MCNC: 0.72 MG/DL — SIGNIFICANT CHANGE UP (ref 0.5–1.3)
CREAT SERPL-MCNC: 0.73 MG/DL — SIGNIFICANT CHANGE UP (ref 0.5–1.3)
CREAT SERPL-MCNC: 0.74 MG/DL — SIGNIFICANT CHANGE UP (ref 0.5–1.3)
CREAT SERPL-MCNC: 0.78 MG/DL — SIGNIFICANT CHANGE UP (ref 0.5–1.3)
CREAT SERPL-MCNC: 0.78 MG/DL — SIGNIFICANT CHANGE UP (ref 0.5–1.3)
CREAT SERPL-MCNC: 0.81 MG/DL — SIGNIFICANT CHANGE UP (ref 0.5–1.3)
CREAT SERPL-MCNC: 0.84 MG/DL — SIGNIFICANT CHANGE UP (ref 0.5–1.3)
CREAT SERPL-MCNC: 0.91 MG/DL — SIGNIFICANT CHANGE UP (ref 0.5–1.3)
CREAT SERPL-MCNC: 0.92 MG/DL — SIGNIFICANT CHANGE UP (ref 0.5–1.3)
CREAT SERPL-MCNC: 0.93 MG/DL — SIGNIFICANT CHANGE UP (ref 0.5–1.3)
CREAT SERPL-MCNC: 0.94 MG/DL — SIGNIFICANT CHANGE UP (ref 0.5–1.3)
CREAT SERPL-MCNC: 0.98 MG/DL — SIGNIFICANT CHANGE UP (ref 0.5–1.3)
CREAT SERPL-MCNC: 0.98 MG/DL — SIGNIFICANT CHANGE UP (ref 0.5–1.3)
CREAT SERPL-MCNC: 1 MG/DL — SIGNIFICANT CHANGE UP (ref 0.5–1.3)
CREAT SERPL-MCNC: 1.01 MG/DL — SIGNIFICANT CHANGE UP (ref 0.5–1.3)
CREAT SERPL-MCNC: 1.03 MG/DL — SIGNIFICANT CHANGE UP (ref 0.5–1.3)
CREAT SERPL-MCNC: 1.11 MG/DL — SIGNIFICANT CHANGE UP (ref 0.5–1.3)
CREAT SERPL-MCNC: 1.11 MG/DL — SIGNIFICANT CHANGE UP (ref 0.5–1.3)
CREAT SERPL-MCNC: 1.12 MG/DL — SIGNIFICANT CHANGE UP (ref 0.5–1.3)
CREAT SERPL-MCNC: 1.14 MG/DL — SIGNIFICANT CHANGE UP (ref 0.5–1.3)
CREAT SERPL-MCNC: 1.23 MG/DL — SIGNIFICANT CHANGE UP (ref 0.5–1.3)
CREAT SERPL-MCNC: 1.24 MG/DL — SIGNIFICANT CHANGE UP (ref 0.5–1.3)
CREAT SERPL-MCNC: 1.26 MG/DL — SIGNIFICANT CHANGE UP (ref 0.5–1.3)
CREAT SERPL-MCNC: 1.3 MG/DL — SIGNIFICANT CHANGE UP (ref 0.5–1.3)
CREAT SERPL-MCNC: 1.32 MG/DL — HIGH (ref 0.5–1.3)
CREAT SERPL-MCNC: 1.33 MG/DL — HIGH (ref 0.5–1.3)
CREAT SERPL-MCNC: 1.35 MG/DL — HIGH (ref 0.5–1.3)
CREAT SERPL-MCNC: 1.38 MG/DL — HIGH (ref 0.5–1.3)
CREAT SERPL-MCNC: 1.39 MG/DL — HIGH (ref 0.5–1.3)
CREAT SERPL-MCNC: 1.52 MG/DL — HIGH (ref 0.5–1.3)
CREAT SERPL-MCNC: 1.72 MG/DL — HIGH (ref 0.5–1.3)
CREAT SERPL-MCNC: 1.76 MG/DL — HIGH (ref 0.5–1.3)
CREAT SERPL-MCNC: 1.86 MG/DL — HIGH (ref 0.5–1.3)
CREAT SERPL-MCNC: 1.86 MG/DL — HIGH (ref 0.5–1.3)
CREAT SERPL-MCNC: 1.97 MG/DL — HIGH (ref 0.5–1.3)
CREAT SERPL-MCNC: 2.01 MG/DL — HIGH (ref 0.5–1.3)
CREAT SERPL-MCNC: 2.01 MG/DL — HIGH (ref 0.5–1.3)
CREAT SERPL-MCNC: 2.05 MG/DL — HIGH (ref 0.5–1.3)
CREAT SERPL-MCNC: 2.06 MG/DL — HIGH (ref 0.5–1.3)
CREAT SERPL-MCNC: 2.25 MG/DL — HIGH (ref 0.5–1.3)
CREAT SERPL-MCNC: 2.55 MG/DL — HIGH (ref 0.5–1.3)
CREAT SERPL-MCNC: 2.82 MG/DL — HIGH (ref 0.5–1.3)
CREATININE URINE RESULT, DAU: 107 MG/DL — SIGNIFICANT CHANGE UP
CULTURE RESULTS: ABNORMAL
CULTURE RESULTS: ABNORMAL
CULTURE RESULTS: NO GROWTH — SIGNIFICANT CHANGE UP
CULTURE RESULTS: SIGNIFICANT CHANGE UP
DACRYOCYTES BLD QL SMEAR: SLIGHT — SIGNIFICANT CHANGE UP
DIFF PNL FLD: ABNORMAL
DIFF PNL FLD: ABNORMAL
DSDNA AB SER-ACNC: 1 IU/ML — SIGNIFICANT CHANGE UP
EBV DNA SERPL NAA+PROBE-ACNC: SIGNIFICANT CHANGE UP IU/ML
EBV EA AB SER IA-ACNC: >150 U/ML — HIGH
EBV EA AB TITR SER IF: POSITIVE
EBV EA IGG SER-ACNC: POSITIVE
EBV NA IGG SER IA-ACNC: >600 U/ML — HIGH
EBV PATRN SPEC IB-IMP: SIGNIFICANT CHANGE UP
EBV VCA IGG AVIDITY SER QL IA: POSITIVE
EBV VCA IGM SER IA-ACNC: 12.7 U/ML — SIGNIFICANT CHANGE UP
EBV VCA IGM SER IA-ACNC: 722 U/ML — HIGH
EBV VCA IGM TITR FLD: NEGATIVE — SIGNIFICANT CHANGE UP
EBVPCR LOG: SIGNIFICANT CHANGE UP LOG10IU/ML
EGFR: 103 ML/MIN/1.73M2 — SIGNIFICANT CHANGE UP
EGFR: 104 ML/MIN/1.73M2 — SIGNIFICANT CHANGE UP
EGFR: 106 ML/MIN/1.73M2 — SIGNIFICANT CHANGE UP
EGFR: 107 ML/MIN/1.73M2 — SIGNIFICANT CHANGE UP
EGFR: 111 ML/MIN/1.73M2 — SIGNIFICANT CHANGE UP
EGFR: 112 ML/MIN/1.73M2 — SIGNIFICANT CHANGE UP
EGFR: 113 ML/MIN/1.73M2 — SIGNIFICANT CHANGE UP
EGFR: 113 ML/MIN/1.73M2 — SIGNIFICANT CHANGE UP
EGFR: 115 ML/MIN/1.73M2 — SIGNIFICANT CHANGE UP
EGFR: 116 ML/MIN/1.73M2 — SIGNIFICANT CHANGE UP
EGFR: 116 ML/MIN/1.73M2 — SIGNIFICANT CHANGE UP
EGFR: 117 ML/MIN/1.73M2 — SIGNIFICANT CHANGE UP
EGFR: 121 ML/MIN/1.73M2 — SIGNIFICANT CHANGE UP
EGFR: 122 ML/MIN/1.73M2 — SIGNIFICANT CHANGE UP
EGFR: 125 ML/MIN/1.73M2 — SIGNIFICANT CHANGE UP
EGFR: 128 ML/MIN/1.73M2 — SIGNIFICANT CHANGE UP
EGFR: 128 ML/MIN/1.73M2 — SIGNIFICANT CHANGE UP
EGFR: 129 ML/MIN/1.73M2 — SIGNIFICANT CHANGE UP
EGFR: 131 ML/MIN/1.73M2 — SIGNIFICANT CHANGE UP
EGFR: 28 ML/MIN/1.73M2 — LOW
EGFR: 31 ML/MIN/1.73M2 — LOW
EGFR: 36 ML/MIN/1.73M2 — LOW
EGFR: 40 ML/MIN/1.73M2 — LOW
EGFR: 40 ML/MIN/1.73M2 — LOW
EGFR: 41 ML/MIN/1.73M2 — LOW
EGFR: 41 ML/MIN/1.73M2 — LOW
EGFR: 42 ML/MIN/1.73M2 — LOW
EGFR: 45 ML/MIN/1.73M2 — LOW
EGFR: 45 ML/MIN/1.73M2 — LOW
EGFR: 49 ML/MIN/1.73M2 — LOW
EGFR: 50 ML/MIN/1.73M2 — LOW
EGFR: 58 ML/MIN/1.73M2 — LOW
EGFR: 65 ML/MIN/1.73M2 — SIGNIFICANT CHANGE UP
EGFR: 65 ML/MIN/1.73M2 — SIGNIFICANT CHANGE UP
EGFR: 67 ML/MIN/1.73M2 — SIGNIFICANT CHANGE UP
EGFR: 68 ML/MIN/1.73M2 — SIGNIFICANT CHANGE UP
EGFR: 69 ML/MIN/1.73M2 — SIGNIFICANT CHANGE UP
EGFR: 70 ML/MIN/1.73M2 — SIGNIFICANT CHANGE UP
EGFR: 73 ML/MIN/1.73M2 — SIGNIFICANT CHANGE UP
EGFR: 74 ML/MIN/1.73M2 — SIGNIFICANT CHANGE UP
EGFR: 75 ML/MIN/1.73M2 — SIGNIFICANT CHANGE UP
EGFR: 82 ML/MIN/1.73M2 — SIGNIFICANT CHANGE UP
EGFR: 84 ML/MIN/1.73M2 — SIGNIFICANT CHANGE UP
EGFR: 92 ML/MIN/1.73M2 — SIGNIFICANT CHANGE UP
EGFR: 95 ML/MIN/1.73M2 — SIGNIFICANT CHANGE UP
EGFR: 96 ML/MIN/1.73M2 — SIGNIFICANT CHANGE UP
EGFR: 98 ML/MIN/1.73M2 — SIGNIFICANT CHANGE UP
EGFR: 98 ML/MIN/1.73M2 — SIGNIFICANT CHANGE UP
ELLIPTOCYTES BLD QL SMEAR: SLIGHT — SIGNIFICANT CHANGE UP
EOSINOPHIL # BLD AUTO: 0 K/UL — SIGNIFICANT CHANGE UP (ref 0–0.5)
EOSINOPHIL # BLD AUTO: 0.01 K/UL — SIGNIFICANT CHANGE UP (ref 0–0.5)
EOSINOPHIL # BLD AUTO: 0.02 K/UL — SIGNIFICANT CHANGE UP (ref 0–0.5)
EOSINOPHIL # BLD AUTO: 0.03 K/UL — SIGNIFICANT CHANGE UP (ref 0–0.5)
EOSINOPHIL # BLD AUTO: 0.04 K/UL — SIGNIFICANT CHANGE UP (ref 0–0.5)
EOSINOPHIL # BLD AUTO: 0.04 K/UL — SIGNIFICANT CHANGE UP (ref 0–0.5)
EOSINOPHIL # BLD AUTO: 0.06 K/UL — SIGNIFICANT CHANGE UP (ref 0–0.5)
EOSINOPHIL # BLD AUTO: 0.07 K/UL — SIGNIFICANT CHANGE UP (ref 0–0.5)
EOSINOPHIL # BLD AUTO: 0.08 K/UL — SIGNIFICANT CHANGE UP (ref 0–0.5)
EOSINOPHIL # BLD AUTO: 0.13 K/UL — SIGNIFICANT CHANGE UP (ref 0–0.5)
EOSINOPHIL # BLD AUTO: 0.15 K/UL — SIGNIFICANT CHANGE UP (ref 0–0.5)
EOSINOPHIL # BLD AUTO: 0.18 K/UL — SIGNIFICANT CHANGE UP (ref 0–0.5)
EOSINOPHIL # BLD AUTO: 0.19 K/UL — SIGNIFICANT CHANGE UP (ref 0–0.5)
EOSINOPHIL # BLD AUTO: 0.2 K/UL — SIGNIFICANT CHANGE UP (ref 0–0.5)
EOSINOPHIL # BLD AUTO: 0.21 K/UL — SIGNIFICANT CHANGE UP (ref 0–0.5)
EOSINOPHIL # BLD AUTO: 0.23 K/UL — SIGNIFICANT CHANGE UP (ref 0–0.5)
EOSINOPHIL # BLD AUTO: 0.25 K/UL — SIGNIFICANT CHANGE UP (ref 0–0.5)
EOSINOPHIL # BLD AUTO: 0.25 K/UL — SIGNIFICANT CHANGE UP (ref 0–0.5)
EOSINOPHIL # BLD AUTO: 0.27 K/UL — SIGNIFICANT CHANGE UP (ref 0–0.5)
EOSINOPHIL # BLD AUTO: 0.28 K/UL — SIGNIFICANT CHANGE UP (ref 0–0.5)
EOSINOPHIL # BLD AUTO: 0.29 K/UL — SIGNIFICANT CHANGE UP (ref 0–0.5)
EOSINOPHIL # BLD AUTO: 0.31 K/UL — SIGNIFICANT CHANGE UP (ref 0–0.5)
EOSINOPHIL # BLD AUTO: 0.32 K/UL — SIGNIFICANT CHANGE UP (ref 0–0.5)
EOSINOPHIL # BLD AUTO: 0.34 K/UL — SIGNIFICANT CHANGE UP (ref 0–0.5)
EOSINOPHIL # BLD AUTO: 0.41 K/UL — SIGNIFICANT CHANGE UP (ref 0–0.5)
EOSINOPHIL # BLD AUTO: 0.41 K/UL — SIGNIFICANT CHANGE UP (ref 0–0.5)
EOSINOPHIL # FLD: 13 % — SIGNIFICANT CHANGE UP
EOSINOPHIL NFR BLD AUTO: 0 % — SIGNIFICANT CHANGE UP (ref 0–6)
EOSINOPHIL NFR BLD AUTO: 0.1 % — SIGNIFICANT CHANGE UP (ref 0–6)
EOSINOPHIL NFR BLD AUTO: 0.2 % — SIGNIFICANT CHANGE UP (ref 0–6)
EOSINOPHIL NFR BLD AUTO: 0.4 % — SIGNIFICANT CHANGE UP (ref 0–6)
EOSINOPHIL NFR BLD AUTO: 0.6 % — SIGNIFICANT CHANGE UP (ref 0–6)
EOSINOPHIL NFR BLD AUTO: 0.7 % — SIGNIFICANT CHANGE UP (ref 0–6)
EOSINOPHIL NFR BLD AUTO: 0.8 % — SIGNIFICANT CHANGE UP (ref 0–6)
EOSINOPHIL NFR BLD AUTO: 0.9 % — SIGNIFICANT CHANGE UP (ref 0–6)
EOSINOPHIL NFR BLD AUTO: 1.1 % — SIGNIFICANT CHANGE UP (ref 0–6)
EOSINOPHIL NFR BLD AUTO: 1.3 % — SIGNIFICANT CHANGE UP (ref 0–6)
EOSINOPHIL NFR BLD AUTO: 1.4 % — SIGNIFICANT CHANGE UP (ref 0–6)
EOSINOPHIL NFR BLD AUTO: 1.6 % — SIGNIFICANT CHANGE UP (ref 0–6)
EOSINOPHIL NFR BLD AUTO: 1.6 % — SIGNIFICANT CHANGE UP (ref 0–6)
EOSINOPHIL NFR BLD AUTO: 1.7 % — SIGNIFICANT CHANGE UP (ref 0–6)
EOSINOPHIL NFR BLD AUTO: 2.1 % — SIGNIFICANT CHANGE UP (ref 0–6)
EOSINOPHIL NFR BLD AUTO: 2.1 % — SIGNIFICANT CHANGE UP (ref 0–6)
EOSINOPHIL NFR BLD AUTO: 2.3 % — SIGNIFICANT CHANGE UP (ref 0–6)
EOSINOPHIL NFR BLD AUTO: 2.6 % — SIGNIFICANT CHANGE UP (ref 0–6)
EOSINOPHIL NFR BLD AUTO: 3 % — SIGNIFICANT CHANGE UP (ref 0–6)
EOSINOPHIL NFR BLD AUTO: 3.3 % — SIGNIFICANT CHANGE UP (ref 0–6)
EOSINOPHIL NFR BLD AUTO: 3.6 % — SIGNIFICANT CHANGE UP (ref 0–6)
EOSINOPHIL NFR BLD AUTO: 3.7 % — SIGNIFICANT CHANGE UP (ref 0–6)
EOSINOPHIL NFR BLD AUTO: 3.8 % — SIGNIFICANT CHANGE UP (ref 0–6)
EOSINOPHIL NFR BLD AUTO: 4 % — SIGNIFICANT CHANGE UP (ref 0–6)
ESTIMATED AVERAGE GLUCOSE: 82 — SIGNIFICANT CHANGE UP
ETHANOL SERPL-MCNC: <10 MG/DL — SIGNIFICANT CHANGE UP
FERRITIN SERPL-MCNC: 627 NG/ML — HIGH (ref 30–400)
FERRITIN SERPL-MCNC: 663 NG/ML — HIGH (ref 30–400)
FIBRINOGEN AG PPP IA-MCNC: 251 MG/DL — SIGNIFICANT CHANGE UP (ref 206–478)
FIBRINOGEN AG PPP IA-MCNC: 261 MG/DL — SIGNIFICANT CHANGE UP (ref 206–478)
FIBRINOGEN AG PPP IA-MCNC: 266 MG/DL — SIGNIFICANT CHANGE UP (ref 206–478)
FIBRINOGEN AG PPP IA-MCNC: 269 MG/DL — SIGNIFICANT CHANGE UP (ref 206–478)
FIBRINOGEN AG PPP IA-MCNC: 283 MG/DL — SIGNIFICANT CHANGE UP (ref 206–478)
FIBRINOGEN AG PPP IA-MCNC: 327 MG/DL — SIGNIFICANT CHANGE UP (ref 206–478)
FIBRINOGEN AG PPP IA-MCNC: 343 MG/DL — SIGNIFICANT CHANGE UP (ref 206–478)
FIBRINOGEN AG PPP IA-MCNC: 348 MG/DL — SIGNIFICANT CHANGE UP (ref 206–478)
FIBRINOGEN PPP-MCNC: 132 MG/DL — LOW (ref 200–465)
FIBRINOGEN PPP-MCNC: 143 MG/DL — LOW (ref 200–465)
FIBRINOGEN PPP-MCNC: 149 MG/DL — LOW (ref 200–465)
FIBRINOGEN PPP-MCNC: 152 MG/DL — LOW (ref 200–465)
FIBRINOGEN PPP-MCNC: 153 MG/DL — LOW (ref 200–465)
FIBRINOGEN PPP-MCNC: 157 MG/DL — LOW (ref 200–465)
FIBRINOGEN PPP-MCNC: 157 MG/DL — LOW (ref 200–465)
FIBRINOGEN PPP-MCNC: 158 MG/DL — LOW (ref 200–465)
FIBRINOGEN PPP-MCNC: 160 MG/DL — LOW (ref 200–465)
FIBRINOGEN PPP-MCNC: 161 MG/DL — LOW (ref 200–465)
FIBRINOGEN PPP-MCNC: 165 MG/DL — LOW (ref 200–465)
FIBRINOGEN PPP-MCNC: 165 MG/DL — LOW (ref 200–465)
FIBRINOGEN PPP-MCNC: 173 MG/DL — LOW (ref 200–465)
FIBRINOGEN PPP-MCNC: 175 MG/DL — LOW (ref 200–465)
FIBRINOGEN PPP-MCNC: 177 MG/DL — LOW (ref 200–465)
FIBRINOGEN PPP-MCNC: 183 MG/DL — LOW (ref 200–465)
FIBRINOGEN PPP-MCNC: 191 MG/DL — LOW (ref 200–465)
FIBRINOGEN PPP-MCNC: 201 MG/DL — SIGNIFICANT CHANGE UP (ref 200–465)
FLUAV AG NPH QL: SIGNIFICANT CHANGE UP
FLUAV AG NPH QL: SIGNIFICANT CHANGE UP
FLUAV SUBTYP SPEC NAA+PROBE: SIGNIFICANT CHANGE UP
FLUBV AG NPH QL: SIGNIFICANT CHANGE UP
FLUBV AG NPH QL: SIGNIFICANT CHANGE UP
FLUBV RNA SPEC QL NAA+PROBE: SIGNIFICANT CHANGE UP
FOLATE SERPL-MCNC: 4.4 NG/ML — SIGNIFICANT CHANGE UP (ref 3.1–17.5)
FUNGITELL B-D-GLUCAN,  BRONCHIAL LAVAGE: SIGNIFICANT CHANGE UP
FUNGITELL: 200 PG/ML — HIGH
GALACTOMANNAN AG SERPL-ACNC: 0.05 INDEX — SIGNIFICANT CHANGE UP (ref 0–0.49)
GAMMA INTERFERON BACKGROUND BLD IA-ACNC: 0.02 IU/ML — SIGNIFICANT CHANGE UP
GBM IGG SER-ACNC: <0.2 — SIGNIFICANT CHANGE UP (ref 0–0.9)
GIANT PLATELETS BLD QL SMEAR: PRESENT — SIGNIFICANT CHANGE UP
GIANT PLATELETS BLD QL SMEAR: PRESENT — SIGNIFICANT CHANGE UP
GLUCOSE BLDC GLUCOMTR-MCNC: 107 MG/DL — HIGH (ref 70–99)
GLUCOSE BLDC GLUCOMTR-MCNC: 114 MG/DL — HIGH (ref 70–99)
GLUCOSE BLDC GLUCOMTR-MCNC: 117 MG/DL — HIGH (ref 70–99)
GLUCOSE BLDC GLUCOMTR-MCNC: 124 MG/DL — HIGH (ref 70–99)
GLUCOSE BLDC GLUCOMTR-MCNC: 127 MG/DL — HIGH (ref 70–99)
GLUCOSE BLDC GLUCOMTR-MCNC: 127 MG/DL — HIGH (ref 70–99)
GLUCOSE BLDC GLUCOMTR-MCNC: 129 MG/DL — HIGH (ref 70–99)
GLUCOSE BLDC GLUCOMTR-MCNC: 132 MG/DL — HIGH (ref 70–99)
GLUCOSE BLDC GLUCOMTR-MCNC: 133 MG/DL — HIGH (ref 70–99)
GLUCOSE BLDC GLUCOMTR-MCNC: 151 MG/DL — HIGH (ref 70–99)
GLUCOSE BLDC GLUCOMTR-MCNC: 154 MG/DL — HIGH (ref 70–99)
GLUCOSE BLDC GLUCOMTR-MCNC: 155 MG/DL — HIGH (ref 70–99)
GLUCOSE BLDC GLUCOMTR-MCNC: 160 MG/DL — HIGH (ref 70–99)
GLUCOSE BLDC GLUCOMTR-MCNC: 39 MG/DL — CRITICAL LOW (ref 70–99)
GLUCOSE BLDC GLUCOMTR-MCNC: 52 MG/DL — CRITICAL LOW (ref 70–99)
GLUCOSE BLDC GLUCOMTR-MCNC: 56 MG/DL — LOW (ref 70–99)
GLUCOSE BLDC GLUCOMTR-MCNC: 72 MG/DL — SIGNIFICANT CHANGE UP (ref 70–99)
GLUCOSE BLDC GLUCOMTR-MCNC: 96 MG/DL — SIGNIFICANT CHANGE UP (ref 70–99)
GLUCOSE SERPL-MCNC: 100 MG/DL — HIGH (ref 70–99)
GLUCOSE SERPL-MCNC: 104 MG/DL — HIGH (ref 70–99)
GLUCOSE SERPL-MCNC: 107 MG/DL — HIGH (ref 70–99)
GLUCOSE SERPL-MCNC: 111 MG/DL — HIGH (ref 70–99)
GLUCOSE SERPL-MCNC: 114 MG/DL — HIGH (ref 70–99)
GLUCOSE SERPL-MCNC: 115 MG/DL — HIGH (ref 70–99)
GLUCOSE SERPL-MCNC: 116 MG/DL — HIGH (ref 70–99)
GLUCOSE SERPL-MCNC: 118 MG/DL — HIGH (ref 70–99)
GLUCOSE SERPL-MCNC: 118 MG/DL — HIGH (ref 70–99)
GLUCOSE SERPL-MCNC: 121 MG/DL — HIGH (ref 70–99)
GLUCOSE SERPL-MCNC: 122 MG/DL — HIGH (ref 70–99)
GLUCOSE SERPL-MCNC: 124 MG/DL — HIGH (ref 70–99)
GLUCOSE SERPL-MCNC: 127 MG/DL — HIGH (ref 70–99)
GLUCOSE SERPL-MCNC: 131 MG/DL — HIGH (ref 70–99)
GLUCOSE SERPL-MCNC: 131 MG/DL — HIGH (ref 70–99)
GLUCOSE SERPL-MCNC: 132 MG/DL — HIGH (ref 70–99)
GLUCOSE SERPL-MCNC: 133 MG/DL — HIGH (ref 70–99)
GLUCOSE SERPL-MCNC: 133 MG/DL — HIGH (ref 70–99)
GLUCOSE SERPL-MCNC: 136 MG/DL — HIGH (ref 70–99)
GLUCOSE SERPL-MCNC: 136 MG/DL — HIGH (ref 70–99)
GLUCOSE SERPL-MCNC: 139 MG/DL — HIGH (ref 70–99)
GLUCOSE SERPL-MCNC: 147 MG/DL — HIGH (ref 70–99)
GLUCOSE SERPL-MCNC: 148 MG/DL — HIGH (ref 70–99)
GLUCOSE SERPL-MCNC: 150 MG/DL — HIGH (ref 70–99)
GLUCOSE SERPL-MCNC: 151 MG/DL — HIGH (ref 70–99)
GLUCOSE SERPL-MCNC: 152 MG/DL — HIGH (ref 70–99)
GLUCOSE SERPL-MCNC: 155 MG/DL — HIGH (ref 70–99)
GLUCOSE SERPL-MCNC: 160 MG/DL — HIGH (ref 70–99)
GLUCOSE SERPL-MCNC: 162 MG/DL — HIGH (ref 70–99)
GLUCOSE SERPL-MCNC: 164 MG/DL — HIGH (ref 70–99)
GLUCOSE SERPL-MCNC: 165 MG/DL — HIGH (ref 70–99)
GLUCOSE SERPL-MCNC: 167 MG/DL — HIGH (ref 70–99)
GLUCOSE SERPL-MCNC: 170 MG/DL — HIGH (ref 70–99)
GLUCOSE SERPL-MCNC: 172 MG/DL — HIGH (ref 70–99)
GLUCOSE SERPL-MCNC: 177 MG/DL — HIGH (ref 70–99)
GLUCOSE SERPL-MCNC: 180 MG/DL — HIGH (ref 70–99)
GLUCOSE SERPL-MCNC: 189 MG/DL — HIGH (ref 70–99)
GLUCOSE SERPL-MCNC: 191 MG/DL — HIGH (ref 70–99)
GLUCOSE SERPL-MCNC: 196 MG/DL — HIGH (ref 70–99)
GLUCOSE SERPL-MCNC: 40 MG/DL — CRITICAL LOW (ref 70–99)
GLUCOSE SERPL-MCNC: 55 MG/DL — LOW (ref 70–99)
GLUCOSE SERPL-MCNC: 58 MG/DL — LOW (ref 70–99)
GLUCOSE SERPL-MCNC: 59 MG/DL — LOW (ref 70–99)
GLUCOSE SERPL-MCNC: 70 MG/DL — SIGNIFICANT CHANGE UP (ref 70–99)
GLUCOSE SERPL-MCNC: 77 MG/DL — SIGNIFICANT CHANGE UP (ref 70–99)
GLUCOSE SERPL-MCNC: 92 MG/DL — SIGNIFICANT CHANGE UP (ref 70–99)
GLUCOSE SERPL-MCNC: 92 MG/DL — SIGNIFICANT CHANGE UP (ref 70–99)
GLUCOSE SERPL-MCNC: 96 MG/DL — SIGNIFICANT CHANGE UP (ref 70–99)
GLUCOSE SERPL-MCNC: 96 MG/DL — SIGNIFICANT CHANGE UP (ref 70–99)
GLUCOSE SERPL-MCNC: 97 MG/DL — SIGNIFICANT CHANGE UP (ref 70–99)
GLUCOSE UR QL: NEGATIVE MG/DL — SIGNIFICANT CHANGE UP
GLUCOSE UR QL: NEGATIVE MG/DL — SIGNIFICANT CHANGE UP
GRAM STN FLD: ABNORMAL
GRAM STN FLD: ABNORMAL
GRAM STN FLD: SIGNIFICANT CHANGE UP
HADV DNA SPEC QL NAA+PROBE: SIGNIFICANT CHANGE UP
HAPTOGLOB SERPL-MCNC: <20 MG/DL — LOW (ref 34–200)
HAV IGG SER QL IA: REACTIVE
HAV IGM SER-ACNC: SIGNIFICANT CHANGE UP
HBV CORE AB SER-ACNC: SIGNIFICANT CHANGE UP
HBV CORE IGM SER-ACNC: SIGNIFICANT CHANGE UP
HBV DNA # SERPL NAA+PROBE: SIGNIFICANT CHANGE UP
HBV DNA SERPL NAA+PROBE-LOG#: SIGNIFICANT CHANGE UP LOGIU/ML
HBV SURFACE AB SER-ACNC: 19.6 MIU/ML — SIGNIFICANT CHANGE UP
HBV SURFACE AG SER-ACNC: SIGNIFICANT CHANGE UP
HBV SURFACE AG SER-ACNC: SIGNIFICANT CHANGE UP
HCOV 229E RNA SPEC QL NAA+PROBE: SIGNIFICANT CHANGE UP
HCOV HKU1 RNA SPEC QL NAA+PROBE: SIGNIFICANT CHANGE UP
HCOV NL63 RNA SPEC QL NAA+PROBE: SIGNIFICANT CHANGE UP
HCOV OC43 RNA SPEC QL NAA+PROBE: SIGNIFICANT CHANGE UP
HCT VFR BLD CALC: 19.5 % — CRITICAL LOW (ref 39–50)
HCT VFR BLD CALC: 19.8 % — CRITICAL LOW (ref 39–50)
HCT VFR BLD CALC: 19.9 % — CRITICAL LOW (ref 39–50)
HCT VFR BLD CALC: 19.9 % — CRITICAL LOW (ref 39–50)
HCT VFR BLD CALC: 20 % — CRITICAL LOW (ref 39–50)
HCT VFR BLD CALC: 20.4 % — CRITICAL LOW (ref 39–50)
HCT VFR BLD CALC: 20.8 % — CRITICAL LOW (ref 39–50)
HCT VFR BLD CALC: 20.9 % — CRITICAL LOW (ref 39–50)
HCT VFR BLD CALC: 21 % — CRITICAL LOW (ref 39–50)
HCT VFR BLD CALC: 21.2 % — LOW (ref 39–50)
HCT VFR BLD CALC: 21.2 % — LOW (ref 39–50)
HCT VFR BLD CALC: 21.3 % — LOW (ref 39–50)
HCT VFR BLD CALC: 21.7 % — LOW (ref 39–50)
HCT VFR BLD CALC: 21.7 % — LOW (ref 39–50)
HCT VFR BLD CALC: 21.8 % — LOW (ref 39–50)
HCT VFR BLD CALC: 21.9 % — LOW (ref 39–50)
HCT VFR BLD CALC: 21.9 % — LOW (ref 39–50)
HCT VFR BLD CALC: 22 % — LOW (ref 39–50)
HCT VFR BLD CALC: 22 % — LOW (ref 39–50)
HCT VFR BLD CALC: 22.2 % — LOW (ref 39–50)
HCT VFR BLD CALC: 22.3 % — LOW (ref 39–50)
HCT VFR BLD CALC: 22.3 % — LOW (ref 39–50)
HCT VFR BLD CALC: 22.4 % — LOW (ref 39–50)
HCT VFR BLD CALC: 22.4 % — LOW (ref 39–50)
HCT VFR BLD CALC: 22.5 % — LOW (ref 39–50)
HCT VFR BLD CALC: 22.6 % — LOW (ref 39–50)
HCT VFR BLD CALC: 22.6 % — LOW (ref 39–50)
HCT VFR BLD CALC: 22.7 % — LOW (ref 39–50)
HCT VFR BLD CALC: 22.8 % — LOW (ref 39–50)
HCT VFR BLD CALC: 23 % — LOW (ref 39–50)
HCT VFR BLD CALC: 23.4 % — LOW (ref 39–50)
HCT VFR BLD CALC: 23.4 % — LOW (ref 39–50)
HCT VFR BLD CALC: 23.5 % — LOW (ref 39–50)
HCT VFR BLD CALC: 23.6 % — LOW (ref 39–50)
HCT VFR BLD CALC: 23.9 % — LOW (ref 39–50)
HCT VFR BLD CALC: 24 % — LOW (ref 39–50)
HCT VFR BLD CALC: 24 % — LOW (ref 39–50)
HCT VFR BLD CALC: 24.2 % — LOW (ref 39–50)
HCT VFR BLD CALC: 24.2 % — LOW (ref 39–50)
HCT VFR BLD CALC: 24.3 % — LOW (ref 39–50)
HCT VFR BLD CALC: 24.5 % — LOW (ref 39–50)
HCT VFR BLD CALC: 24.5 % — LOW (ref 39–50)
HCT VFR BLD CALC: 25.4 % — LOW (ref 39–50)
HCT VFR BLD CALC: 25.6 % — LOW (ref 39–50)
HCV AB S/CO SERPL IA: 0.24 S/CO — SIGNIFICANT CHANGE UP (ref 0–0.99)
HCV AB SERPL-IMP: SIGNIFICANT CHANGE UP
HCV RNA SPEC NAA+PROBE-LOG IU: SIGNIFICANT CHANGE UP
HCV RNA SPEC NAA+PROBE-LOG IU: SIGNIFICANT CHANGE UP LOGIU/ML
HEV AB FLD QL: NEGATIVE — SIGNIFICANT CHANGE UP
HEV IGM SER QL: NEGATIVE — SIGNIFICANT CHANGE UP
HGB BLD-MCNC: 6.7 G/DL — CRITICAL LOW (ref 13–17)
HGB BLD-MCNC: 6.7 G/DL — CRITICAL LOW (ref 13–17)
HGB BLD-MCNC: 6.8 G/DL — CRITICAL LOW (ref 13–17)
HGB BLD-MCNC: 6.9 G/DL — CRITICAL LOW (ref 13–17)
HGB BLD-MCNC: 6.9 G/DL — CRITICAL LOW (ref 13–17)
HGB BLD-MCNC: 7 G/DL — CRITICAL LOW (ref 13–17)
HGB BLD-MCNC: 7.1 G/DL — LOW (ref 13–17)
HGB BLD-MCNC: 7.2 G/DL — LOW (ref 13–17)
HGB BLD-MCNC: 7.3 G/DL — LOW (ref 13–17)
HGB BLD-MCNC: 7.4 G/DL — LOW (ref 13–17)
HGB BLD-MCNC: 7.5 G/DL — LOW (ref 13–17)
HGB BLD-MCNC: 7.6 G/DL — LOW (ref 13–17)
HGB BLD-MCNC: 7.6 G/DL — LOW (ref 13–17)
HGB BLD-MCNC: 7.7 G/DL — LOW (ref 13–17)
HGB BLD-MCNC: 7.8 G/DL — LOW (ref 13–17)
HGB BLD-MCNC: 7.9 G/DL — LOW (ref 13–17)
HGB BLD-MCNC: 8 G/DL — LOW (ref 13–17)
HGB BLD-MCNC: 8.1 G/DL — LOW (ref 13–17)
HGB BLD-MCNC: 8.2 G/DL — LOW (ref 13–17)
HGB BLD-MCNC: 8.3 G/DL — LOW (ref 13–17)
HIV 1+2 AB+HIV1 P24 AG SERPL QL IA: SIGNIFICANT CHANGE UP
HMPV RNA SPEC QL NAA+PROBE: SIGNIFICANT CHANGE UP
HPIV1 RNA SPEC QL NAA+PROBE: SIGNIFICANT CHANGE UP
HPIV2 RNA SPEC QL NAA+PROBE: SIGNIFICANT CHANGE UP
HPIV3 RNA SPEC QL NAA+PROBE: SIGNIFICANT CHANGE UP
HPIV4 RNA SPEC QL NAA+PROBE: SIGNIFICANT CHANGE UP
HSV1 IGG SER-ACNC: 32 INDEX — HIGH
HSV1 IGG SERPL QL IA: POSITIVE
HSV2 IGG FLD-ACNC: 0.97 INDEX — HIGH
HSV2 IGG SERPL QL IA: ABNORMAL
HYALINE CASTS # UR AUTO: PRESENT
HYPOCHROMIA BLD QL: SLIGHT — SIGNIFICANT CHANGE UP
HYPOCHROMIA BLD QL: SLIGHT — SIGNIFICANT CHANGE UP
IANC: 11.4 K/UL — HIGH (ref 1.8–7.4)
IANC: 11.85 K/UL — HIGH (ref 1.8–7.4)
IANC: 11.95 K/UL — HIGH (ref 1.8–7.4)
IANC: 12.15 K/UL — HIGH (ref 1.8–7.4)
IANC: 12.2 K/UL — HIGH (ref 1.8–7.4)
IANC: 12.63 K/UL — HIGH (ref 1.8–7.4)
IANC: 12.65 K/UL — HIGH (ref 1.8–7.4)
IANC: 12.72 K/UL — HIGH (ref 1.8–7.4)
IANC: 12.83 K/UL — HIGH (ref 1.8–7.4)
IANC: 12.9 K/UL — HIGH (ref 1.8–7.4)
IANC: 13.01 K/UL — HIGH (ref 1.8–7.4)
IANC: 13.24 K/UL — HIGH (ref 1.8–7.4)
IANC: 13.61 K/UL — HIGH (ref 1.8–7.4)
IANC: 13.89 K/UL — HIGH (ref 1.8–7.4)
IANC: 13.93 K/UL — HIGH (ref 1.8–7.4)
IANC: 14.65 K/UL — HIGH (ref 1.8–7.4)
IANC: 14.72 K/UL — HIGH (ref 1.8–7.4)
IANC: 14.76 K/UL — HIGH (ref 1.8–7.4)
IANC: 14.98 K/UL — HIGH (ref 1.8–7.4)
IANC: 15.06 K/UL — HIGH (ref 1.8–7.4)
IANC: 15.35 K/UL — HIGH (ref 1.8–7.4)
IANC: 15.9 K/UL — HIGH (ref 1.8–7.4)
IANC: 16.27 K/UL — HIGH (ref 1.8–7.4)
IANC: 16.42 K/UL — HIGH (ref 1.8–7.4)
IANC: 16.67 K/UL — HIGH (ref 1.8–7.4)
IANC: 16.83 K/UL — HIGH (ref 1.8–7.4)
IANC: 16.9 K/UL — HIGH (ref 1.8–7.4)
IANC: 16.99 K/UL — HIGH (ref 1.8–7.4)
IANC: 17.77 K/UL — HIGH (ref 1.8–7.4)
IANC: 4.3 K/UL — SIGNIFICANT CHANGE UP (ref 1.8–7.4)
IANC: 4.51 K/UL — SIGNIFICANT CHANGE UP (ref 1.8–7.4)
IANC: 4.52 K/UL — SIGNIFICANT CHANGE UP (ref 1.8–7.4)
IANC: 4.67 K/UL — SIGNIFICANT CHANGE UP (ref 1.8–7.4)
IANC: 4.81 K/UL — SIGNIFICANT CHANGE UP (ref 1.8–7.4)
IANC: 4.9 K/UL — SIGNIFICANT CHANGE UP (ref 1.8–7.4)
IANC: 5.01 K/UL — SIGNIFICANT CHANGE UP (ref 1.8–7.4)
IANC: 5.1 K/UL — SIGNIFICANT CHANGE UP (ref 1.8–7.4)
IANC: 5.17 K/UL — SIGNIFICANT CHANGE UP (ref 1.8–7.4)
IANC: 5.19 K/UL — SIGNIFICANT CHANGE UP (ref 1.8–7.4)
IANC: 5.59 K/UL — SIGNIFICANT CHANGE UP (ref 1.8–7.4)
IANC: 5.74 K/UL — SIGNIFICANT CHANGE UP (ref 1.8–7.4)
IANC: 5.83 K/UL — SIGNIFICANT CHANGE UP (ref 1.8–7.4)
IANC: 5.86 K/UL — SIGNIFICANT CHANGE UP (ref 1.8–7.4)
IANC: 5.93 K/UL — SIGNIFICANT CHANGE UP (ref 1.8–7.4)
IANC: 6.03 K/UL — SIGNIFICANT CHANGE UP (ref 1.8–7.4)
IANC: 6.96 K/UL — SIGNIFICANT CHANGE UP (ref 1.8–7.4)
IANC: 7.45 K/UL — HIGH (ref 1.8–7.4)
IANC: 8.24 K/UL — HIGH (ref 1.8–7.4)
IANC: 8.96 K/UL — HIGH (ref 1.8–7.4)
IANC: 8.96 K/UL — HIGH (ref 1.8–7.4)
IANC: 9.25 K/UL — HIGH (ref 1.8–7.4)
IANC: 9.27 K/UL — HIGH (ref 1.8–7.4)
IANC: 9.89 K/UL — HIGH (ref 1.8–7.4)
IGA FLD-MCNC: 574 MG/DL — HIGH (ref 84–499)
IGG FLD-MCNC: 1886 MG/DL — HIGH (ref 610–1660)
IGM SERPL-MCNC: 208 MG/DL — SIGNIFICANT CHANGE UP (ref 35–242)
IMM GRANULOCYTES NFR BLD AUTO: 0.7 % — SIGNIFICANT CHANGE UP (ref 0–0.9)
IMM GRANULOCYTES NFR BLD AUTO: 0.7 % — SIGNIFICANT CHANGE UP (ref 0–0.9)
IMM GRANULOCYTES NFR BLD AUTO: 1 % — HIGH (ref 0–0.9)
IMM GRANULOCYTES NFR BLD AUTO: 1 % — HIGH (ref 0–0.9)
IMM GRANULOCYTES NFR BLD AUTO: 1.1 % — HIGH (ref 0–0.9)
IMM GRANULOCYTES NFR BLD AUTO: 1.2 % — HIGH (ref 0–0.9)
IMM GRANULOCYTES NFR BLD AUTO: 1.2 % — HIGH (ref 0–0.9)
IMM GRANULOCYTES NFR BLD AUTO: 1.3 % — HIGH (ref 0–0.9)
IMM GRANULOCYTES NFR BLD AUTO: 1.4 % — HIGH (ref 0–0.9)
IMM GRANULOCYTES NFR BLD AUTO: 1.5 % — HIGH (ref 0–0.9)
IMM GRANULOCYTES NFR BLD AUTO: 1.5 % — HIGH (ref 0–0.9)
IMM GRANULOCYTES NFR BLD AUTO: 1.6 % — HIGH (ref 0–0.9)
IMM GRANULOCYTES NFR BLD AUTO: 1.7 % — HIGH (ref 0–0.9)
IMM GRANULOCYTES NFR BLD AUTO: 1.8 % — HIGH (ref 0–0.9)
IMM GRANULOCYTES NFR BLD AUTO: 2 % — HIGH (ref 0–0.9)
IMM GRANULOCYTES NFR BLD AUTO: 2 % — HIGH (ref 0–0.9)
IMM GRANULOCYTES NFR BLD AUTO: 2.2 % — HIGH (ref 0–0.9)
IMM GRANULOCYTES NFR BLD AUTO: 2.2 % — HIGH (ref 0–0.9)
IMM GRANULOCYTES NFR BLD AUTO: 2.5 % — HIGH (ref 0–0.9)
IMM GRANULOCYTES NFR BLD AUTO: 2.6 % — HIGH (ref 0–0.9)
IMM GRANULOCYTES NFR BLD AUTO: 2.7 % — HIGH (ref 0–0.9)
IMM GRANULOCYTES NFR BLD AUTO: 2.8 % — HIGH (ref 0–0.9)
IMM GRANULOCYTES NFR BLD AUTO: 2.9 % — HIGH (ref 0–0.9)
IMM GRANULOCYTES NFR BLD AUTO: 3.1 % — HIGH (ref 0–0.9)
IMM GRANULOCYTES NFR BLD AUTO: 3.1 % — HIGH (ref 0–0.9)
IMM GRANULOCYTES NFR BLD AUTO: 3.2 % — HIGH (ref 0–0.9)
IMM GRANULOCYTES NFR BLD AUTO: 3.2 % — HIGH (ref 0–0.9)
IMM GRANULOCYTES NFR BLD AUTO: 3.3 % — HIGH (ref 0–0.9)
IMM GRANULOCYTES NFR BLD AUTO: 3.5 % — HIGH (ref 0–0.9)
IMM GRANULOCYTES NFR BLD AUTO: 4.7 % — HIGH (ref 0–0.9)
IMM GRANULOCYTES NFR BLD AUTO: 4.9 % — HIGH (ref 0–0.9)
IMM GRANULOCYTES NFR BLD AUTO: 5.3 % — HIGH (ref 0–0.9)
IMM GRANULOCYTES NFR BLD AUTO: 5.3 % — HIGH (ref 0–0.9)
IMM GRANULOCYTES NFR BLD AUTO: 5.4 % — HIGH (ref 0–0.9)
IMM GRANULOCYTES NFR BLD AUTO: 6.6 % — HIGH (ref 0–0.9)
IMM GRANULOCYTES NFR BLD AUTO: 7.1 % — HIGH (ref 0–0.9)
IMM GRANULOCYTES NFR BLD AUTO: 7.3 % — HIGH (ref 0–0.9)
INR BLD: 2.48 RATIO — HIGH (ref 0.85–1.18)
INR BLD: 2.49 RATIO — HIGH (ref 0.85–1.18)
INR BLD: 2.79 RATIO — HIGH (ref 0.85–1.18)
INR BLD: 2.83 RATIO — HIGH (ref 0.85–1.18)
INR BLD: 2.93 RATIO — HIGH (ref 0.85–1.18)
INR BLD: 3.03 RATIO — HIGH (ref 0.85–1.18)
INR BLD: 3.03 RATIO — HIGH (ref 0.85–1.18)
INR BLD: 3.04 RATIO — HIGH (ref 0.85–1.18)
INR BLD: 3.12 RATIO — HIGH (ref 0.85–1.18)
INR BLD: 3.24 RATIO — HIGH (ref 0.85–1.18)
INR BLD: 3.35 RATIO — HIGH (ref 0.85–1.18)
INR BLD: 3.35 RATIO — HIGH (ref 0.85–1.18)
INR BLD: 3.41 RATIO — HIGH (ref 0.85–1.18)
INR BLD: 3.47 RATIO — HIGH (ref 0.85–1.18)
INR BLD: 3.52 RATIO — HIGH (ref 0.85–1.18)
INR BLD: 3.53 RATIO — HIGH (ref 0.85–1.18)
INR BLD: 3.55 RATIO — HIGH (ref 0.85–1.18)
INR BLD: 3.59 RATIO — HIGH (ref 0.85–1.18)
INR BLD: 3.75 RATIO — HIGH (ref 0.85–1.18)
INR BLD: 3.75 RATIO — HIGH (ref 0.85–1.18)
INR BLD: 3.88 RATIO — HIGH (ref 0.85–1.18)
INR BLD: 3.92 RATIO — HIGH (ref 0.85–1.18)
INR BLD: 4.23 RATIO — HIGH (ref 0.85–1.18)
INR BLD: 4.23 RATIO — HIGH (ref 0.85–1.18)
INR BLD: 4.43 RATIO — HIGH (ref 0.85–1.18)
INR BLD: 4.55 RATIO — HIGH (ref 0.85–1.18)
INR BLD: 4.57 RATIO — HIGH (ref 0.85–1.18)
INR BLD: 4.7 RATIO — HIGH (ref 0.85–1.18)
INR BLD: 4.8 RATIO — HIGH (ref 0.85–1.18)
INR BLD: 4.85 RATIO — HIGH (ref 0.85–1.18)
INR BLD: 4.86 RATIO — HIGH (ref 0.85–1.18)
INR BLD: 4.94 RATIO — HIGH (ref 0.85–1.18)
INR BLD: 5.05 RATIO — CRITICAL HIGH (ref 0.85–1.18)
INR BLD: 5.22 RATIO — CRITICAL HIGH (ref 0.85–1.18)
INR BLD: 5.71 RATIO — CRITICAL HIGH (ref 0.85–1.18)
INR BLD: 6.09 RATIO — CRITICAL HIGH (ref 0.85–1.18)
INR BLD: 8.48 RATIO — CRITICAL HIGH (ref 0.85–1.18)
INR BLD: 9.83 RATIO — CRITICAL HIGH (ref 0.85–1.18)
IRON SATN MFR SERPL: 27 % — SIGNIFICANT CHANGE UP (ref 14–50)
IRON SATN MFR SERPL: 29 % — SIGNIFICANT CHANGE UP (ref 14–50)
IRON SATN MFR SERPL: 49 UG/DL — SIGNIFICANT CHANGE UP (ref 45–165)
IRON SATN MFR SERPL: 50 UG/DL — SIGNIFICANT CHANGE UP (ref 45–165)
KAPPA LC SER QL IFE: 12.79 MG/DL — HIGH (ref 0.33–1.94)
KAPPA/LAMBDA FREE LIGHT CHAIN RATIO, SERUM: 2.65 RATIO — HIGH (ref 0.26–1.65)
KETONES UR-MCNC: NEGATIVE MG/DL — SIGNIFICANT CHANGE UP
KETONES UR-MCNC: NEGATIVE MG/DL — SIGNIFICANT CHANGE UP
LACTATE SERPL-SCNC: 1.5 MMOL/L — SIGNIFICANT CHANGE UP (ref 0.5–2)
LACTATE SERPL-SCNC: 1.7 MMOL/L — SIGNIFICANT CHANGE UP (ref 0.5–2)
LACTATE SERPL-SCNC: 2.3 MMOL/L — HIGH (ref 0.5–2)
LACTATE SERPL-SCNC: 2.3 MMOL/L — HIGH (ref 0.5–2)
LACTATE SERPL-SCNC: 2.4 MMOL/L — HIGH (ref 0.5–2)
LACTATE SERPL-SCNC: 2.4 MMOL/L — HIGH (ref 0.5–2)
LACTATE SERPL-SCNC: 2.8 MMOL/L — HIGH (ref 0.5–2)
LACTATE SERPL-SCNC: 2.8 MMOL/L — HIGH (ref 0.5–2)
LACTATE SERPL-SCNC: 2.9 MMOL/L — HIGH (ref 0.5–2)
LACTATE SERPL-SCNC: 2.9 MMOL/L — HIGH (ref 0.5–2)
LACTATE SERPL-SCNC: 3 MMOL/L — HIGH (ref 0.5–2)
LACTATE SERPL-SCNC: 3 MMOL/L — HIGH (ref 0.5–2)
LACTATE SERPL-SCNC: 3.3 MMOL/L — HIGH (ref 0.5–2)
LACTATE SERPL-SCNC: 3.3 MMOL/L — HIGH (ref 0.5–2)
LACTATE SERPL-SCNC: 3.5 MMOL/L — HIGH (ref 0.5–2)
LACTATE SERPL-SCNC: 3.6 MMOL/L — HIGH (ref 0.5–2)
LAMBDA LC SER QL IFE: 4.83 MG/DL — HIGH (ref 0.57–2.63)
LDH SERPL L TO P-CCNC: 582 U/L — HIGH (ref 135–225)
LEGIONELLA AG UR QL: NEGATIVE — SIGNIFICANT CHANGE UP
LEUKOCYTE ESTERASE UR-ACNC: ABNORMAL
LEUKOCYTE ESTERASE UR-ACNC: ABNORMAL
LIDOCAIN IGE QN: 46 U/L — SIGNIFICANT CHANGE UP (ref 7–60)
LIDOCAIN IGE QN: 54 U/L — SIGNIFICANT CHANGE UP (ref 7–60)
LIDOCAIN IGE QN: 60 U/L — SIGNIFICANT CHANGE UP (ref 7–60)
LUPUS ANTICOAGULANT PROFILE RESULT: SIGNIFICANT CHANGE UP
LYMPHOCYTES # BLD AUTO: 0.23 K/UL — LOW (ref 1–3.3)
LYMPHOCYTES # BLD AUTO: 0.3 K/UL — LOW (ref 1–3.3)
LYMPHOCYTES # BLD AUTO: 0.34 K/UL — LOW (ref 1–3.3)
LYMPHOCYTES # BLD AUTO: 0.36 K/UL — LOW (ref 1–3.3)
LYMPHOCYTES # BLD AUTO: 0.36 K/UL — LOW (ref 1–3.3)
LYMPHOCYTES # BLD AUTO: 0.42 K/UL — LOW (ref 1–3.3)
LYMPHOCYTES # BLD AUTO: 0.5 K/UL — LOW (ref 1–3.3)
LYMPHOCYTES # BLD AUTO: 0.52 K/UL — LOW (ref 1–3.3)
LYMPHOCYTES # BLD AUTO: 0.53 K/UL — LOW (ref 1–3.3)
LYMPHOCYTES # BLD AUTO: 0.57 K/UL — LOW (ref 1–3.3)
LYMPHOCYTES # BLD AUTO: 0.63 K/UL — LOW (ref 1–3.3)
LYMPHOCYTES # BLD AUTO: 0.64 K/UL — LOW (ref 1–3.3)
LYMPHOCYTES # BLD AUTO: 0.67 K/UL — LOW (ref 1–3.3)
LYMPHOCYTES # BLD AUTO: 0.71 K/UL — LOW (ref 1–3.3)
LYMPHOCYTES # BLD AUTO: 0.72 K/UL — LOW (ref 1–3.3)
LYMPHOCYTES # BLD AUTO: 0.72 K/UL — LOW (ref 1–3.3)
LYMPHOCYTES # BLD AUTO: 0.73 K/UL — LOW (ref 1–3.3)
LYMPHOCYTES # BLD AUTO: 0.73 K/UL — LOW (ref 1–3.3)
LYMPHOCYTES # BLD AUTO: 0.75 K/UL — LOW (ref 1–3.3)
LYMPHOCYTES # BLD AUTO: 0.75 K/UL — LOW (ref 1–3.3)
LYMPHOCYTES # BLD AUTO: 0.76 K/UL — LOW (ref 1–3.3)
LYMPHOCYTES # BLD AUTO: 0.77 K/UL — LOW (ref 1–3.3)
LYMPHOCYTES # BLD AUTO: 0.77 K/UL — LOW (ref 1–3.3)
LYMPHOCYTES # BLD AUTO: 0.79 K/UL — LOW (ref 1–3.3)
LYMPHOCYTES # BLD AUTO: 0.8 K/UL — LOW (ref 1–3.3)
LYMPHOCYTES # BLD AUTO: 0.85 K/UL — LOW (ref 1–3.3)
LYMPHOCYTES # BLD AUTO: 0.85 K/UL — LOW (ref 1–3.3)
LYMPHOCYTES # BLD AUTO: 0.86 K/UL — LOW (ref 1–3.3)
LYMPHOCYTES # BLD AUTO: 0.87 K/UL — LOW (ref 1–3.3)
LYMPHOCYTES # BLD AUTO: 0.88 K/UL — LOW (ref 1–3.3)
LYMPHOCYTES # BLD AUTO: 0.89 K/UL — LOW (ref 1–3.3)
LYMPHOCYTES # BLD AUTO: 0.91 K/UL — LOW (ref 1–3.3)
LYMPHOCYTES # BLD AUTO: 0.92 K/UL — LOW (ref 1–3.3)
LYMPHOCYTES # BLD AUTO: 1 K/UL — SIGNIFICANT CHANGE UP (ref 1–3.3)
LYMPHOCYTES # BLD AUTO: 1.02 K/UL — SIGNIFICANT CHANGE UP (ref 1–3.3)
LYMPHOCYTES # BLD AUTO: 1.05 K/UL — SIGNIFICANT CHANGE UP (ref 1–3.3)
LYMPHOCYTES # BLD AUTO: 1.07 K/UL — SIGNIFICANT CHANGE UP (ref 1–3.3)
LYMPHOCYTES # BLD AUTO: 1.25 K/UL — SIGNIFICANT CHANGE UP (ref 1–3.3)
LYMPHOCYTES # BLD AUTO: 1.36 K/UL — SIGNIFICANT CHANGE UP (ref 1–3.3)
LYMPHOCYTES # BLD AUTO: 1.38 K/UL — SIGNIFICANT CHANGE UP (ref 1–3.3)
LYMPHOCYTES # BLD AUTO: 1.44 K/UL — SIGNIFICANT CHANGE UP (ref 1–3.3)
LYMPHOCYTES # BLD AUTO: 1.53 K/UL — SIGNIFICANT CHANGE UP (ref 1–3.3)
LYMPHOCYTES # BLD AUTO: 1.73 K/UL — SIGNIFICANT CHANGE UP (ref 1–3.3)
LYMPHOCYTES # BLD AUTO: 1.81 K/UL — SIGNIFICANT CHANGE UP (ref 1–3.3)
LYMPHOCYTES # BLD AUTO: 1.86 K/UL — SIGNIFICANT CHANGE UP (ref 1–3.3)
LYMPHOCYTES # BLD AUTO: 10.2 % — LOW (ref 13–44)
LYMPHOCYTES # BLD AUTO: 10.3 % — LOW (ref 13–44)
LYMPHOCYTES # BLD AUTO: 10.4 % — LOW (ref 13–44)
LYMPHOCYTES # BLD AUTO: 10.7 % — LOW (ref 13–44)
LYMPHOCYTES # BLD AUTO: 11.1 % — LOW (ref 13–44)
LYMPHOCYTES # BLD AUTO: 11.3 % — LOW (ref 13–44)
LYMPHOCYTES # BLD AUTO: 11.3 % — LOW (ref 13–44)
LYMPHOCYTES # BLD AUTO: 12 % — LOW (ref 13–44)
LYMPHOCYTES # BLD AUTO: 12.1 % — LOW (ref 13–44)
LYMPHOCYTES # BLD AUTO: 12.4 % — LOW (ref 13–44)
LYMPHOCYTES # BLD AUTO: 13.7 % — SIGNIFICANT CHANGE UP (ref 13–44)
LYMPHOCYTES # BLD AUTO: 14.9 % — SIGNIFICANT CHANGE UP (ref 13–44)
LYMPHOCYTES # BLD AUTO: 15.5 % — SIGNIFICANT CHANGE UP (ref 13–44)
LYMPHOCYTES # BLD AUTO: 16.2 % — SIGNIFICANT CHANGE UP (ref 13–44)
LYMPHOCYTES # BLD AUTO: 19.1 % — SIGNIFICANT CHANGE UP (ref 13–44)
LYMPHOCYTES # BLD AUTO: 19.4 % — SIGNIFICANT CHANGE UP (ref 13–44)
LYMPHOCYTES # BLD AUTO: 19.5 % — SIGNIFICANT CHANGE UP (ref 13–44)
LYMPHOCYTES # BLD AUTO: 19.7 % — SIGNIFICANT CHANGE UP (ref 13–44)
LYMPHOCYTES # BLD AUTO: 19.9 % — SIGNIFICANT CHANGE UP (ref 13–44)
LYMPHOCYTES # BLD AUTO: 2.09 K/UL — SIGNIFICANT CHANGE UP (ref 1–3.3)
LYMPHOCYTES # BLD AUTO: 2.3 K/UL — SIGNIFICANT CHANGE UP (ref 1–3.3)
LYMPHOCYTES # BLD AUTO: 2.53 K/UL — SIGNIFICANT CHANGE UP (ref 1–3.3)
LYMPHOCYTES # BLD AUTO: 2.55 K/UL — SIGNIFICANT CHANGE UP (ref 1–3.3)
LYMPHOCYTES # BLD AUTO: 2.64 K/UL — SIGNIFICANT CHANGE UP (ref 1–3.3)
LYMPHOCYTES # BLD AUTO: 2.8 % — LOW (ref 13–44)
LYMPHOCYTES # BLD AUTO: 20.3 % — SIGNIFICANT CHANGE UP (ref 13–44)
LYMPHOCYTES # BLD AUTO: 24.9 % — SIGNIFICANT CHANGE UP (ref 13–44)
LYMPHOCYTES # BLD AUTO: 3.07 K/UL — SIGNIFICANT CHANGE UP (ref 1–3.3)
LYMPHOCYTES # BLD AUTO: 3.15 K/UL — SIGNIFICANT CHANGE UP (ref 1–3.3)
LYMPHOCYTES # BLD AUTO: 3.6 % — LOW (ref 13–44)
LYMPHOCYTES # BLD AUTO: 3.6 % — LOW (ref 13–44)
LYMPHOCYTES # BLD AUTO: 3.71 K/UL — HIGH (ref 1–3.3)
LYMPHOCYTES # BLD AUTO: 3.9 % — LOW (ref 13–44)
LYMPHOCYTES # BLD AUTO: 4 % — LOW (ref 13–44)
LYMPHOCYTES # BLD AUTO: 4.3 % — LOW (ref 13–44)
LYMPHOCYTES # BLD AUTO: 4.5 % — LOW (ref 13–44)
LYMPHOCYTES # BLD AUTO: 4.5 % — LOW (ref 13–44)
LYMPHOCYTES # BLD AUTO: 4.8 % — LOW (ref 13–44)
LYMPHOCYTES # BLD AUTO: 5 % — LOW (ref 13–44)
LYMPHOCYTES # BLD AUTO: 5 % — LOW (ref 13–44)
LYMPHOCYTES # BLD AUTO: 5.1 % — LOW (ref 13–44)
LYMPHOCYTES # BLD AUTO: 5.2 % — LOW (ref 13–44)
LYMPHOCYTES # BLD AUTO: 5.2 % — LOW (ref 13–44)
LYMPHOCYTES # BLD AUTO: 5.3 % — LOW (ref 13–44)
LYMPHOCYTES # BLD AUTO: 5.3 % — LOW (ref 13–44)
LYMPHOCYTES # BLD AUTO: 5.4 % — LOW (ref 13–44)
LYMPHOCYTES # BLD AUTO: 5.5 % — LOW (ref 13–44)
LYMPHOCYTES # BLD AUTO: 5.7 % — LOW (ref 13–44)
LYMPHOCYTES # BLD AUTO: 5.9 % — LOW (ref 13–44)
LYMPHOCYTES # BLD AUTO: 6 % — LOW (ref 13–44)
LYMPHOCYTES # BLD AUTO: 6.4 % — LOW (ref 13–44)
LYMPHOCYTES # BLD AUTO: 7 % — LOW (ref 13–44)
LYMPHOCYTES # BLD AUTO: 9 % — LOW (ref 13–44)
LYMPHOCYTES # BLD AUTO: 9 % — LOW (ref 13–44)
LYMPHOCYTES # FLD: 16 % — SIGNIFICANT CHANGE UP
M PNEUMO DNA SPEC QL NAA+PROBE: SIGNIFICANT CHANGE UP
M TB IFN-G BLD-IMP: NEGATIVE — SIGNIFICANT CHANGE UP
M TB IFN-G CD4+ BCKGRND COR BLD-ACNC: 0 IU/ML — SIGNIFICANT CHANGE UP
M TB IFN-G CD4+CD8+ BCKGRND COR BLD-ACNC: 0 IU/ML — SIGNIFICANT CHANGE UP
MACROCYTES BLD QL: SIGNIFICANT CHANGE UP
MAGNESIUM SERPL-MCNC: 1.4 MG/DL — LOW (ref 1.6–2.6)
MAGNESIUM SERPL-MCNC: 1.9 MG/DL — SIGNIFICANT CHANGE UP (ref 1.6–2.6)
MAGNESIUM SERPL-MCNC: 2 MG/DL — SIGNIFICANT CHANGE UP (ref 1.6–2.6)
MAGNESIUM SERPL-MCNC: 2.1 MG/DL — SIGNIFICANT CHANGE UP (ref 1.6–2.6)
MAGNESIUM SERPL-MCNC: 2.2 MG/DL — SIGNIFICANT CHANGE UP (ref 1.6–2.6)
MAGNESIUM SERPL-MCNC: 2.3 MG/DL — SIGNIFICANT CHANGE UP (ref 1.6–2.6)
MAGNESIUM SERPL-MCNC: 2.4 MG/DL — SIGNIFICANT CHANGE UP (ref 1.6–2.6)
MAGNESIUM SERPL-MCNC: 2.5 MG/DL — SIGNIFICANT CHANGE UP (ref 1.6–2.6)
MANUAL SMEAR VERIFICATION: SIGNIFICANT CHANGE UP
MANUAL SMEAR VERIFICATION: SIGNIFICANT CHANGE UP
MCHC RBC-ENTMCNC: 29.8 PG — SIGNIFICANT CHANGE UP (ref 27–34)
MCHC RBC-ENTMCNC: 30.1 PG — SIGNIFICANT CHANGE UP (ref 27–34)
MCHC RBC-ENTMCNC: 30.2 PG — SIGNIFICANT CHANGE UP (ref 27–34)
MCHC RBC-ENTMCNC: 30.3 PG — SIGNIFICANT CHANGE UP (ref 27–34)
MCHC RBC-ENTMCNC: 30.3 PG — SIGNIFICANT CHANGE UP (ref 27–34)
MCHC RBC-ENTMCNC: 30.4 PG — SIGNIFICANT CHANGE UP (ref 27–34)
MCHC RBC-ENTMCNC: 30.5 PG — SIGNIFICANT CHANGE UP (ref 27–34)
MCHC RBC-ENTMCNC: 30.6 PG — SIGNIFICANT CHANGE UP (ref 27–34)
MCHC RBC-ENTMCNC: 30.7 PG — SIGNIFICANT CHANGE UP (ref 27–34)
MCHC RBC-ENTMCNC: 30.8 PG — SIGNIFICANT CHANGE UP (ref 27–34)
MCHC RBC-ENTMCNC: 30.9 PG — SIGNIFICANT CHANGE UP (ref 27–34)
MCHC RBC-ENTMCNC: 31 PG — SIGNIFICANT CHANGE UP (ref 27–34)
MCHC RBC-ENTMCNC: 31.1 PG — SIGNIFICANT CHANGE UP (ref 27–34)
MCHC RBC-ENTMCNC: 31.2 PG — SIGNIFICANT CHANGE UP (ref 27–34)
MCHC RBC-ENTMCNC: 31.3 PG — SIGNIFICANT CHANGE UP (ref 27–34)
MCHC RBC-ENTMCNC: 31.4 PG — SIGNIFICANT CHANGE UP (ref 27–34)
MCHC RBC-ENTMCNC: 31.6 GM/DL — LOW (ref 32–36)
MCHC RBC-ENTMCNC: 31.6 PG — SIGNIFICANT CHANGE UP (ref 27–34)
MCHC RBC-ENTMCNC: 31.7 PG — SIGNIFICANT CHANGE UP (ref 27–34)
MCHC RBC-ENTMCNC: 31.9 GM/DL — LOW (ref 32–36)
MCHC RBC-ENTMCNC: 31.9 PG — SIGNIFICANT CHANGE UP (ref 27–34)
MCHC RBC-ENTMCNC: 32.1 GM/DL — SIGNIFICANT CHANGE UP (ref 32–36)
MCHC RBC-ENTMCNC: 32.1 PG — SIGNIFICANT CHANGE UP (ref 27–34)
MCHC RBC-ENTMCNC: 32.2 GM/DL — SIGNIFICANT CHANGE UP (ref 32–36)
MCHC RBC-ENTMCNC: 32.2 PG — SIGNIFICANT CHANGE UP (ref 27–34)
MCHC RBC-ENTMCNC: 32.3 GM/DL — SIGNIFICANT CHANGE UP (ref 32–36)
MCHC RBC-ENTMCNC: 32.4 GM/DL — SIGNIFICANT CHANGE UP (ref 32–36)
MCHC RBC-ENTMCNC: 32.4 PG — SIGNIFICANT CHANGE UP (ref 27–34)
MCHC RBC-ENTMCNC: 32.5 GM/DL — SIGNIFICANT CHANGE UP (ref 32–36)
MCHC RBC-ENTMCNC: 32.6 GM/DL — SIGNIFICANT CHANGE UP (ref 32–36)
MCHC RBC-ENTMCNC: 32.6 GM/DL — SIGNIFICANT CHANGE UP (ref 32–36)
MCHC RBC-ENTMCNC: 32.7 GM/DL — SIGNIFICANT CHANGE UP (ref 32–36)
MCHC RBC-ENTMCNC: 32.8 GM/DL — SIGNIFICANT CHANGE UP (ref 32–36)
MCHC RBC-ENTMCNC: 32.9 GM/DL — SIGNIFICANT CHANGE UP (ref 32–36)
MCHC RBC-ENTMCNC: 33 GM/DL — SIGNIFICANT CHANGE UP (ref 32–36)
MCHC RBC-ENTMCNC: 33.1 GM/DL — SIGNIFICANT CHANGE UP (ref 32–36)
MCHC RBC-ENTMCNC: 33.1 GM/DL — SIGNIFICANT CHANGE UP (ref 32–36)
MCHC RBC-ENTMCNC: 33.3 GM/DL — SIGNIFICANT CHANGE UP (ref 32–36)
MCHC RBC-ENTMCNC: 33.3 GM/DL — SIGNIFICANT CHANGE UP (ref 32–36)
MCHC RBC-ENTMCNC: 33.5 GM/DL — SIGNIFICANT CHANGE UP (ref 32–36)
MCHC RBC-ENTMCNC: 33.5 GM/DL — SIGNIFICANT CHANGE UP (ref 32–36)
MCHC RBC-ENTMCNC: 33.7 GM/DL — SIGNIFICANT CHANGE UP (ref 32–36)
MCHC RBC-ENTMCNC: 33.8 GM/DL — SIGNIFICANT CHANGE UP (ref 32–36)
MCHC RBC-ENTMCNC: 33.8 GM/DL — SIGNIFICANT CHANGE UP (ref 32–36)
MCHC RBC-ENTMCNC: 33.9 GM/DL — SIGNIFICANT CHANGE UP (ref 32–36)
MCHC RBC-ENTMCNC: 34.2 GM/DL — SIGNIFICANT CHANGE UP (ref 32–36)
MCHC RBC-ENTMCNC: 34.5 GM/DL — SIGNIFICANT CHANGE UP (ref 32–36)
MCHC RBC-ENTMCNC: 34.6 GM/DL — SIGNIFICANT CHANGE UP (ref 32–36)
MCHC RBC-ENTMCNC: 34.7 GM/DL — SIGNIFICANT CHANGE UP (ref 32–36)
MCHC RBC-ENTMCNC: 34.8 GM/DL — SIGNIFICANT CHANGE UP (ref 32–36)
MCHC RBC-ENTMCNC: 34.9 GM/DL — SIGNIFICANT CHANGE UP (ref 32–36)
MCHC RBC-ENTMCNC: 35 GM/DL — SIGNIFICANT CHANGE UP (ref 32–36)
MCHC RBC-ENTMCNC: 35 GM/DL — SIGNIFICANT CHANGE UP (ref 32–36)
MCHC RBC-ENTMCNC: 35.1 GM/DL — SIGNIFICANT CHANGE UP (ref 32–36)
MCHC RBC-ENTMCNC: 35.2 GM/DL — SIGNIFICANT CHANGE UP (ref 32–36)
MCHC RBC-ENTMCNC: 35.4 GM/DL — SIGNIFICANT CHANGE UP (ref 32–36)
MCHC RBC-ENTMCNC: 35.4 GM/DL — SIGNIFICANT CHANGE UP (ref 32–36)
MCHC RBC-ENTMCNC: 35.9 GM/DL — SIGNIFICANT CHANGE UP (ref 32–36)
MCHC RBC-ENTMCNC: 36 GM/DL — SIGNIFICANT CHANGE UP (ref 32–36)
MCHC RBC-ENTMCNC: 36 GM/DL — SIGNIFICANT CHANGE UP (ref 32–36)
MCHC RBC-ENTMCNC: 36.4 GM/DL — HIGH (ref 32–36)
MCV RBC AUTO: 87 FL — SIGNIFICANT CHANGE UP (ref 80–100)
MCV RBC AUTO: 87.3 FL — SIGNIFICANT CHANGE UP (ref 80–100)
MCV RBC AUTO: 88 FL — SIGNIFICANT CHANGE UP (ref 80–100)
MCV RBC AUTO: 88 FL — SIGNIFICANT CHANGE UP (ref 80–100)
MCV RBC AUTO: 88.1 FL — SIGNIFICANT CHANGE UP (ref 80–100)
MCV RBC AUTO: 88.4 FL — SIGNIFICANT CHANGE UP (ref 80–100)
MCV RBC AUTO: 88.7 FL — SIGNIFICANT CHANGE UP (ref 80–100)
MCV RBC AUTO: 88.9 FL — SIGNIFICANT CHANGE UP (ref 80–100)
MCV RBC AUTO: 89 FL — SIGNIFICANT CHANGE UP (ref 80–100)
MCV RBC AUTO: 89 FL — SIGNIFICANT CHANGE UP (ref 80–100)
MCV RBC AUTO: 89.3 FL — SIGNIFICANT CHANGE UP (ref 80–100)
MCV RBC AUTO: 89.4 FL — SIGNIFICANT CHANGE UP (ref 80–100)
MCV RBC AUTO: 89.6 FL — SIGNIFICANT CHANGE UP (ref 80–100)
MCV RBC AUTO: 89.7 FL — SIGNIFICANT CHANGE UP (ref 80–100)
MCV RBC AUTO: 90.2 FL — SIGNIFICANT CHANGE UP (ref 80–100)
MCV RBC AUTO: 90.3 FL — SIGNIFICANT CHANGE UP (ref 80–100)
MCV RBC AUTO: 90.6 FL — SIGNIFICANT CHANGE UP (ref 80–100)
MCV RBC AUTO: 90.9 FL — SIGNIFICANT CHANGE UP (ref 80–100)
MCV RBC AUTO: 91.7 FL — SIGNIFICANT CHANGE UP (ref 80–100)
MCV RBC AUTO: 91.9 FL — SIGNIFICANT CHANGE UP (ref 80–100)
MCV RBC AUTO: 92.1 FL — SIGNIFICANT CHANGE UP (ref 80–100)
MCV RBC AUTO: 92.2 FL — SIGNIFICANT CHANGE UP (ref 80–100)
MCV RBC AUTO: 92.2 FL — SIGNIFICANT CHANGE UP (ref 80–100)
MCV RBC AUTO: 92.3 FL — SIGNIFICANT CHANGE UP (ref 80–100)
MCV RBC AUTO: 92.5 FL — SIGNIFICANT CHANGE UP (ref 80–100)
MCV RBC AUTO: 92.6 FL — SIGNIFICANT CHANGE UP (ref 80–100)
MCV RBC AUTO: 92.9 FL — SIGNIFICANT CHANGE UP (ref 80–100)
MCV RBC AUTO: 93.2 FL — SIGNIFICANT CHANGE UP (ref 80–100)
MCV RBC AUTO: 93.2 FL — SIGNIFICANT CHANGE UP (ref 80–100)
MCV RBC AUTO: 93.4 FL — SIGNIFICANT CHANGE UP (ref 80–100)
MCV RBC AUTO: 93.7 FL — SIGNIFICANT CHANGE UP (ref 80–100)
MCV RBC AUTO: 93.8 FL — SIGNIFICANT CHANGE UP (ref 80–100)
MCV RBC AUTO: 94.1 FL — SIGNIFICANT CHANGE UP (ref 80–100)
MCV RBC AUTO: 94.4 FL — SIGNIFICANT CHANGE UP (ref 80–100)
MCV RBC AUTO: 94.4 FL — SIGNIFICANT CHANGE UP (ref 80–100)
MCV RBC AUTO: 94.6 FL — SIGNIFICANT CHANGE UP (ref 80–100)
MCV RBC AUTO: 94.7 FL — SIGNIFICANT CHANGE UP (ref 80–100)
MCV RBC AUTO: 94.8 FL — SIGNIFICANT CHANGE UP (ref 80–100)
MCV RBC AUTO: 94.8 FL — SIGNIFICANT CHANGE UP (ref 80–100)
MCV RBC AUTO: 95 FL — SIGNIFICANT CHANGE UP (ref 80–100)
MCV RBC AUTO: 95.5 FL — SIGNIFICANT CHANGE UP (ref 80–100)
MCV RBC AUTO: 95.5 FL — SIGNIFICANT CHANGE UP (ref 80–100)
MCV RBC AUTO: 96.2 FL — SIGNIFICANT CHANGE UP (ref 80–100)
MCV RBC AUTO: 96.4 FL — SIGNIFICANT CHANGE UP (ref 80–100)
MCV RBC AUTO: 96.5 FL — SIGNIFICANT CHANGE UP (ref 80–100)
MCV RBC AUTO: 96.8 FL — SIGNIFICANT CHANGE UP (ref 80–100)
MCV RBC AUTO: 99.5 FL — SIGNIFICANT CHANGE UP (ref 80–100)
METAMYELOCYTES # FLD: 0.9 % — SIGNIFICANT CHANGE UP (ref 0–1)
METAMYELOCYTES # FLD: 1.7 % — HIGH (ref 0–1)
METHADONE UR-MCNC: NEGATIVE — SIGNIFICANT CHANGE UP
MITOCHONDRIA AB SER-ACNC: SIGNIFICANT CHANGE UP
MONOCYTES # BLD AUTO: 0.32 K/UL — SIGNIFICANT CHANGE UP (ref 0–0.9)
MONOCYTES # BLD AUTO: 0.37 K/UL — SIGNIFICANT CHANGE UP (ref 0–0.9)
MONOCYTES # BLD AUTO: 0.38 K/UL — SIGNIFICANT CHANGE UP (ref 0–0.9)
MONOCYTES # BLD AUTO: 0.41 K/UL — SIGNIFICANT CHANGE UP (ref 0–0.9)
MONOCYTES # BLD AUTO: 0.41 K/UL — SIGNIFICANT CHANGE UP (ref 0–0.9)
MONOCYTES # BLD AUTO: 0.43 K/UL — SIGNIFICANT CHANGE UP (ref 0–0.9)
MONOCYTES # BLD AUTO: 0.44 K/UL — SIGNIFICANT CHANGE UP (ref 0–0.9)
MONOCYTES # BLD AUTO: 0.44 K/UL — SIGNIFICANT CHANGE UP (ref 0–0.9)
MONOCYTES # BLD AUTO: 0.45 K/UL — SIGNIFICANT CHANGE UP (ref 0–0.9)
MONOCYTES # BLD AUTO: 0.46 K/UL — SIGNIFICANT CHANGE UP (ref 0–0.9)
MONOCYTES # BLD AUTO: 0.47 K/UL — SIGNIFICANT CHANGE UP (ref 0–0.9)
MONOCYTES # BLD AUTO: 0.48 K/UL — SIGNIFICANT CHANGE UP (ref 0–0.9)
MONOCYTES # BLD AUTO: 0.51 K/UL — SIGNIFICANT CHANGE UP (ref 0–0.9)
MONOCYTES # BLD AUTO: 0.52 K/UL — SIGNIFICANT CHANGE UP (ref 0–0.9)
MONOCYTES # BLD AUTO: 0.57 K/UL — SIGNIFICANT CHANGE UP (ref 0–0.9)
MONOCYTES # BLD AUTO: 0.59 K/UL — SIGNIFICANT CHANGE UP (ref 0–0.9)
MONOCYTES # BLD AUTO: 0.61 K/UL — SIGNIFICANT CHANGE UP (ref 0–0.9)
MONOCYTES # BLD AUTO: 0.7 K/UL — SIGNIFICANT CHANGE UP (ref 0–0.9)
MONOCYTES # BLD AUTO: 0.72 K/UL — SIGNIFICANT CHANGE UP (ref 0–0.9)
MONOCYTES # BLD AUTO: 0.79 K/UL — SIGNIFICANT CHANGE UP (ref 0–0.9)
MONOCYTES # BLD AUTO: 0.81 K/UL — SIGNIFICANT CHANGE UP (ref 0–0.9)
MONOCYTES # BLD AUTO: 0.97 K/UL — HIGH (ref 0–0.9)
MONOCYTES # BLD AUTO: 0.98 K/UL — HIGH (ref 0–0.9)
MONOCYTES # BLD AUTO: 0.99 K/UL — HIGH (ref 0–0.9)
MONOCYTES # BLD AUTO: 1 K/UL — HIGH (ref 0–0.9)
MONOCYTES # BLD AUTO: 1.02 K/UL — HIGH (ref 0–0.9)
MONOCYTES # BLD AUTO: 1.06 K/UL — HIGH (ref 0–0.9)
MONOCYTES # BLD AUTO: 1.09 K/UL — HIGH (ref 0–0.9)
MONOCYTES # BLD AUTO: 1.13 K/UL — HIGH (ref 0–0.9)
MONOCYTES # BLD AUTO: 1.15 K/UL — HIGH (ref 0–0.9)
MONOCYTES # BLD AUTO: 1.18 K/UL — HIGH (ref 0–0.9)
MONOCYTES # BLD AUTO: 1.19 K/UL — HIGH (ref 0–0.9)
MONOCYTES # BLD AUTO: 1.2 K/UL — HIGH (ref 0–0.9)
MONOCYTES # BLD AUTO: 1.24 K/UL — HIGH (ref 0–0.9)
MONOCYTES # BLD AUTO: 1.26 K/UL — HIGH (ref 0–0.9)
MONOCYTES # BLD AUTO: 1.28 K/UL — HIGH (ref 0–0.9)
MONOCYTES # BLD AUTO: 1.47 K/UL — HIGH (ref 0–0.9)
MONOCYTES # BLD AUTO: 1.56 K/UL — HIGH (ref 0–0.9)
MONOCYTES # BLD AUTO: 1.59 K/UL — HIGH (ref 0–0.9)
MONOCYTES # BLD AUTO: 1.7 K/UL — HIGH (ref 0–0.9)
MONOCYTES # BLD AUTO: 1.72 K/UL — HIGH (ref 0–0.9)
MONOCYTES # BLD AUTO: 2.01 K/UL — HIGH (ref 0–0.9)
MONOCYTES NFR BLD AUTO: 11.4 % — SIGNIFICANT CHANGE UP (ref 2–14)
MONOCYTES NFR BLD AUTO: 11.4 % — SIGNIFICANT CHANGE UP (ref 2–14)
MONOCYTES NFR BLD AUTO: 12 % — SIGNIFICANT CHANGE UP (ref 2–14)
MONOCYTES NFR BLD AUTO: 12.9 % — SIGNIFICANT CHANGE UP (ref 2–14)
MONOCYTES NFR BLD AUTO: 12.9 % — SIGNIFICANT CHANGE UP (ref 2–14)
MONOCYTES NFR BLD AUTO: 13.2 % — SIGNIFICANT CHANGE UP (ref 2–14)
MONOCYTES NFR BLD AUTO: 13.6 % — SIGNIFICANT CHANGE UP (ref 2–14)
MONOCYTES NFR BLD AUTO: 14 % — SIGNIFICANT CHANGE UP (ref 2–14)
MONOCYTES NFR BLD AUTO: 14.1 % — HIGH (ref 2–14)
MONOCYTES NFR BLD AUTO: 14.2 % — HIGH (ref 2–14)
MONOCYTES NFR BLD AUTO: 14.7 % — HIGH (ref 2–14)
MONOCYTES NFR BLD AUTO: 15 % — HIGH (ref 2–14)
MONOCYTES NFR BLD AUTO: 15.3 % — HIGH (ref 2–14)
MONOCYTES NFR BLD AUTO: 2 % — SIGNIFICANT CHANGE UP (ref 2–14)
MONOCYTES NFR BLD AUTO: 2.4 % — SIGNIFICANT CHANGE UP (ref 2–14)
MONOCYTES NFR BLD AUTO: 2.4 % — SIGNIFICANT CHANGE UP (ref 2–14)
MONOCYTES NFR BLD AUTO: 2.5 % — SIGNIFICANT CHANGE UP (ref 2–14)
MONOCYTES NFR BLD AUTO: 2.6 % — SIGNIFICANT CHANGE UP (ref 2–14)
MONOCYTES NFR BLD AUTO: 2.7 % — SIGNIFICANT CHANGE UP (ref 2–14)
MONOCYTES NFR BLD AUTO: 2.8 % — SIGNIFICANT CHANGE UP (ref 2–14)
MONOCYTES NFR BLD AUTO: 2.8 % — SIGNIFICANT CHANGE UP (ref 2–14)
MONOCYTES NFR BLD AUTO: 3 % — SIGNIFICANT CHANGE UP (ref 2–14)
MONOCYTES NFR BLD AUTO: 3 % — SIGNIFICANT CHANGE UP (ref 2–14)
MONOCYTES NFR BLD AUTO: 3.1 % — SIGNIFICANT CHANGE UP (ref 2–14)
MONOCYTES NFR BLD AUTO: 3.1 % — SIGNIFICANT CHANGE UP (ref 2–14)
MONOCYTES NFR BLD AUTO: 3.3 % — SIGNIFICANT CHANGE UP (ref 2–14)
MONOCYTES NFR BLD AUTO: 3.3 % — SIGNIFICANT CHANGE UP (ref 2–14)
MONOCYTES NFR BLD AUTO: 3.6 % — SIGNIFICANT CHANGE UP (ref 2–14)
MONOCYTES NFR BLD AUTO: 3.7 % — SIGNIFICANT CHANGE UP (ref 2–14)
MONOCYTES NFR BLD AUTO: 3.7 % — SIGNIFICANT CHANGE UP (ref 2–14)
MONOCYTES NFR BLD AUTO: 3.8 % — SIGNIFICANT CHANGE UP (ref 2–14)
MONOCYTES NFR BLD AUTO: 3.8 % — SIGNIFICANT CHANGE UP (ref 2–14)
MONOCYTES NFR BLD AUTO: 3.9 % — SIGNIFICANT CHANGE UP (ref 2–14)
MONOCYTES NFR BLD AUTO: 3.9 % — SIGNIFICANT CHANGE UP (ref 2–14)
MONOCYTES NFR BLD AUTO: 4 % — SIGNIFICANT CHANGE UP (ref 2–14)
MONOCYTES NFR BLD AUTO: 4.8 % — SIGNIFICANT CHANGE UP (ref 2–14)
MONOCYTES NFR BLD AUTO: 5 % — SIGNIFICANT CHANGE UP (ref 2–14)
MONOCYTES NFR BLD AUTO: 5.5 % — SIGNIFICANT CHANGE UP (ref 2–14)
MONOCYTES NFR BLD AUTO: 5.5 % — SIGNIFICANT CHANGE UP (ref 2–14)
MONOCYTES NFR BLD AUTO: 5.6 % — SIGNIFICANT CHANGE UP (ref 2–14)
MONOCYTES NFR BLD AUTO: 6.1 % — SIGNIFICANT CHANGE UP (ref 2–14)
MONOCYTES NFR BLD AUTO: 7 % — SIGNIFICANT CHANGE UP (ref 2–14)
MONOCYTES NFR BLD AUTO: 7.1 % — SIGNIFICANT CHANGE UP (ref 2–14)
MONOCYTES NFR BLD AUTO: 7.5 % — SIGNIFICANT CHANGE UP (ref 2–14)
MONOCYTES NFR BLD AUTO: 7.6 % — SIGNIFICANT CHANGE UP (ref 2–14)
MONOCYTES NFR BLD AUTO: 7.6 % — SIGNIFICANT CHANGE UP (ref 2–14)
MONOCYTES NFR BLD AUTO: 8 % — SIGNIFICANT CHANGE UP (ref 2–14)
MONOCYTES NFR BLD AUTO: 8.3 % — SIGNIFICANT CHANGE UP (ref 2–14)
MONOCYTES NFR BLD AUTO: 8.5 % — SIGNIFICANT CHANGE UP (ref 2–14)
MONOCYTES NFR BLD AUTO: 8.7 % — SIGNIFICANT CHANGE UP (ref 2–14)
MONOCYTES NFR BLD AUTO: 9.1 % — SIGNIFICANT CHANGE UP (ref 2–14)
MONOS+MACROS # FLD: 12 % — SIGNIFICANT CHANGE UP
MPO AB + PR3 PNL SER: SIGNIFICANT CHANGE UP
MRSA PCR RESULT.: SIGNIFICANT CHANGE UP
MYELOCYTES NFR BLD: 1.8 % — HIGH (ref 0–0)
NEUTROPHILS # BLD AUTO: 11.4 K/UL — HIGH (ref 1.8–7.4)
NEUTROPHILS # BLD AUTO: 11.85 K/UL — HIGH (ref 1.8–7.4)
NEUTROPHILS # BLD AUTO: 11.95 K/UL — HIGH (ref 1.8–7.4)
NEUTROPHILS # BLD AUTO: 12.15 K/UL — HIGH (ref 1.8–7.4)
NEUTROPHILS # BLD AUTO: 12.2 K/UL — HIGH (ref 1.8–7.4)
NEUTROPHILS # BLD AUTO: 12.63 K/UL — HIGH (ref 1.8–7.4)
NEUTROPHILS # BLD AUTO: 12.65 K/UL — HIGH (ref 1.8–7.4)
NEUTROPHILS # BLD AUTO: 12.72 K/UL — HIGH (ref 1.8–7.4)
NEUTROPHILS # BLD AUTO: 12.83 K/UL — HIGH (ref 1.8–7.4)
NEUTROPHILS # BLD AUTO: 12.9 K/UL — HIGH (ref 1.8–7.4)
NEUTROPHILS # BLD AUTO: 13.01 K/UL — HIGH (ref 1.8–7.4)
NEUTROPHILS # BLD AUTO: 13.24 K/UL — HIGH (ref 1.8–7.4)
NEUTROPHILS # BLD AUTO: 13.61 K/UL — HIGH (ref 1.8–7.4)
NEUTROPHILS # BLD AUTO: 13.89 K/UL — HIGH (ref 1.8–7.4)
NEUTROPHILS # BLD AUTO: 13.93 K/UL — HIGH (ref 1.8–7.4)
NEUTROPHILS # BLD AUTO: 14.65 K/UL — HIGH (ref 1.8–7.4)
NEUTROPHILS # BLD AUTO: 14.72 K/UL — HIGH (ref 1.8–7.4)
NEUTROPHILS # BLD AUTO: 14.76 K/UL — HIGH (ref 1.8–7.4)
NEUTROPHILS # BLD AUTO: 14.98 K/UL — HIGH (ref 1.8–7.4)
NEUTROPHILS # BLD AUTO: 15.06 K/UL — HIGH (ref 1.8–7.4)
NEUTROPHILS # BLD AUTO: 15.35 K/UL — HIGH (ref 1.8–7.4)
NEUTROPHILS # BLD AUTO: 16.27 K/UL — HIGH (ref 1.8–7.4)
NEUTROPHILS # BLD AUTO: 16.42 K/UL — HIGH (ref 1.8–7.4)
NEUTROPHILS # BLD AUTO: 16.67 K/UL — HIGH (ref 1.8–7.4)
NEUTROPHILS # BLD AUTO: 16.83 K/UL — HIGH (ref 1.8–7.4)
NEUTROPHILS # BLD AUTO: 16.9 K/UL — HIGH (ref 1.8–7.4)
NEUTROPHILS # BLD AUTO: 16.99 K/UL — HIGH (ref 1.8–7.4)
NEUTROPHILS # BLD AUTO: 17.3 K/UL — HIGH (ref 1.8–7.4)
NEUTROPHILS # BLD AUTO: 17.77 K/UL — HIGH (ref 1.8–7.4)
NEUTROPHILS # BLD AUTO: 4.3 K/UL — SIGNIFICANT CHANGE UP (ref 1.8–7.4)
NEUTROPHILS # BLD AUTO: 4.51 K/UL — SIGNIFICANT CHANGE UP (ref 1.8–7.4)
NEUTROPHILS # BLD AUTO: 4.52 K/UL — SIGNIFICANT CHANGE UP (ref 1.8–7.4)
NEUTROPHILS # BLD AUTO: 4.67 K/UL — SIGNIFICANT CHANGE UP (ref 1.8–7.4)
NEUTROPHILS # BLD AUTO: 4.81 K/UL — SIGNIFICANT CHANGE UP (ref 1.8–7.4)
NEUTROPHILS # BLD AUTO: 4.9 K/UL — SIGNIFICANT CHANGE UP (ref 1.8–7.4)
NEUTROPHILS # BLD AUTO: 5.01 K/UL — SIGNIFICANT CHANGE UP (ref 1.8–7.4)
NEUTROPHILS # BLD AUTO: 5.1 K/UL — SIGNIFICANT CHANGE UP (ref 1.8–7.4)
NEUTROPHILS # BLD AUTO: 5.17 K/UL — SIGNIFICANT CHANGE UP (ref 1.8–7.4)
NEUTROPHILS # BLD AUTO: 5.19 K/UL — SIGNIFICANT CHANGE UP (ref 1.8–7.4)
NEUTROPHILS # BLD AUTO: 5.59 K/UL — SIGNIFICANT CHANGE UP (ref 1.8–7.4)
NEUTROPHILS # BLD AUTO: 5.74 K/UL — SIGNIFICANT CHANGE UP (ref 1.8–7.4)
NEUTROPHILS # BLD AUTO: 5.83 K/UL — SIGNIFICANT CHANGE UP (ref 1.8–7.4)
NEUTROPHILS # BLD AUTO: 5.86 K/UL — SIGNIFICANT CHANGE UP (ref 1.8–7.4)
NEUTROPHILS # BLD AUTO: 6.03 K/UL — SIGNIFICANT CHANGE UP (ref 1.8–7.4)
NEUTROPHILS # BLD AUTO: 6.33 K/UL — SIGNIFICANT CHANGE UP (ref 1.8–7.4)
NEUTROPHILS # BLD AUTO: 7.45 K/UL — HIGH (ref 1.8–7.4)
NEUTROPHILS # BLD AUTO: 7.46 K/UL — HIGH (ref 1.8–7.4)
NEUTROPHILS # BLD AUTO: 8.24 K/UL — HIGH (ref 1.8–7.4)
NEUTROPHILS # BLD AUTO: 8.96 K/UL — HIGH (ref 1.8–7.4)
NEUTROPHILS # BLD AUTO: 8.96 K/UL — HIGH (ref 1.8–7.4)
NEUTROPHILS # BLD AUTO: 9.25 K/UL — HIGH (ref 1.8–7.4)
NEUTROPHILS # BLD AUTO: 9.27 K/UL — HIGH (ref 1.8–7.4)
NEUTROPHILS # BLD AUTO: 9.89 K/UL — HIGH (ref 1.8–7.4)
NEUTROPHILS NFR BLD AUTO: 60.7 % — SIGNIFICANT CHANGE UP (ref 43–77)
NEUTROPHILS NFR BLD AUTO: 61.9 % — SIGNIFICANT CHANGE UP (ref 43–77)
NEUTROPHILS NFR BLD AUTO: 62.8 % — SIGNIFICANT CHANGE UP (ref 43–77)
NEUTROPHILS NFR BLD AUTO: 63.2 % — SIGNIFICANT CHANGE UP (ref 43–77)
NEUTROPHILS NFR BLD AUTO: 63.8 % — SIGNIFICANT CHANGE UP (ref 43–77)
NEUTROPHILS NFR BLD AUTO: 65.3 % — SIGNIFICANT CHANGE UP (ref 43–77)
NEUTROPHILS NFR BLD AUTO: 65.3 % — SIGNIFICANT CHANGE UP (ref 43–77)
NEUTROPHILS NFR BLD AUTO: 65.4 % — SIGNIFICANT CHANGE UP (ref 43–77)
NEUTROPHILS NFR BLD AUTO: 66.9 % — SIGNIFICANT CHANGE UP (ref 43–77)
NEUTROPHILS NFR BLD AUTO: 67.4 % — SIGNIFICANT CHANGE UP (ref 43–77)
NEUTROPHILS NFR BLD AUTO: 67.7 % — SIGNIFICANT CHANGE UP (ref 43–77)
NEUTROPHILS NFR BLD AUTO: 68.4 % — SIGNIFICANT CHANGE UP (ref 43–77)
NEUTROPHILS NFR BLD AUTO: 68.5 % — SIGNIFICANT CHANGE UP (ref 43–77)
NEUTROPHILS NFR BLD AUTO: 69.3 % — SIGNIFICANT CHANGE UP (ref 43–77)
NEUTROPHILS NFR BLD AUTO: 69.5 % — SIGNIFICANT CHANGE UP (ref 43–77)
NEUTROPHILS NFR BLD AUTO: 70 % — SIGNIFICANT CHANGE UP (ref 43–77)
NEUTROPHILS NFR BLD AUTO: 70.2 % — SIGNIFICANT CHANGE UP (ref 43–77)
NEUTROPHILS NFR BLD AUTO: 71.5 % — SIGNIFICANT CHANGE UP (ref 43–77)
NEUTROPHILS NFR BLD AUTO: 74.9 % — SIGNIFICANT CHANGE UP (ref 43–77)
NEUTROPHILS NFR BLD AUTO: 77.4 % — HIGH (ref 43–77)
NEUTROPHILS NFR BLD AUTO: 77.4 % — HIGH (ref 43–77)
NEUTROPHILS NFR BLD AUTO: 80 % — HIGH (ref 43–77)
NEUTROPHILS NFR BLD AUTO: 80.5 % — HIGH (ref 43–77)
NEUTROPHILS NFR BLD AUTO: 81.1 % — HIGH (ref 43–77)
NEUTROPHILS NFR BLD AUTO: 82.2 % — HIGH (ref 43–77)
NEUTROPHILS NFR BLD AUTO: 84.7 % — HIGH (ref 43–77)
NEUTROPHILS NFR BLD AUTO: 84.7 % — HIGH (ref 43–77)
NEUTROPHILS NFR BLD AUTO: 84.9 % — HIGH (ref 43–77)
NEUTROPHILS NFR BLD AUTO: 85.3 % — HIGH (ref 43–77)
NEUTROPHILS NFR BLD AUTO: 85.7 % — HIGH (ref 43–77)
NEUTROPHILS NFR BLD AUTO: 86.7 % — HIGH (ref 43–77)
NEUTROPHILS NFR BLD AUTO: 87.8 % — HIGH (ref 43–77)
NEUTROPHILS NFR BLD AUTO: 88.4 % — HIGH (ref 43–77)
NEUTROPHILS NFR BLD AUTO: 88.5 % — HIGH (ref 43–77)
NEUTROPHILS NFR BLD AUTO: 88.6 % — HIGH (ref 43–77)
NEUTROPHILS NFR BLD AUTO: 89 % — HIGH (ref 43–77)
NEUTROPHILS NFR BLD AUTO: 89.3 % — HIGH (ref 43–77)
NEUTROPHILS NFR BLD AUTO: 89.5 % — HIGH (ref 43–77)
NEUTROPHILS NFR BLD AUTO: 89.6 % — HIGH (ref 43–77)
NEUTROPHILS NFR BLD AUTO: 89.7 % — HIGH (ref 43–77)
NEUTROPHILS NFR BLD AUTO: 89.9 % — HIGH (ref 43–77)
NEUTROPHILS NFR BLD AUTO: 90.3 % — HIGH (ref 43–77)
NEUTROPHILS NFR BLD AUTO: 90.6 % — HIGH (ref 43–77)
NEUTROPHILS NFR BLD AUTO: 90.7 % — HIGH (ref 43–77)
NEUTROPHILS NFR BLD AUTO: 90.7 % — HIGH (ref 43–77)
NEUTROPHILS NFR BLD AUTO: 90.8 % — HIGH (ref 43–77)
NEUTROPHILS NFR BLD AUTO: 90.8 % — HIGH (ref 43–77)
NEUTROPHILS NFR BLD AUTO: 90.9 % — HIGH (ref 43–77)
NEUTROPHILS NFR BLD AUTO: 91 % — HIGH (ref 43–77)
NEUTROPHILS NFR BLD AUTO: 91.1 % — HIGH (ref 43–77)
NEUTROPHILS NFR BLD AUTO: 91.6 % — HIGH (ref 43–77)
NEUTROPHILS-BODY FLUID: 59 % — SIGNIFICANT CHANGE UP
NEUTS BAND # BLD: 3.7 % — SIGNIFICANT CHANGE UP (ref 0–6)
NEUTS BAND # BLD: 5.2 % — SIGNIFICANT CHANGE UP (ref 0–6)
NIGHT BLUE STAIN TISS: SIGNIFICANT CHANGE UP
NIGHT BLUE STAIN TISS: SIGNIFICANT CHANGE UP
NITRITE UR-MCNC: NEGATIVE — SIGNIFICANT CHANGE UP
NITRITE UR-MCNC: POSITIVE
NON-GYNECOLOGICAL CYTOLOGY STUDY: SIGNIFICANT CHANGE UP
NRBC # BLD: 0 /100 WBCS — SIGNIFICANT CHANGE UP (ref 0–0)
NRBC # BLD: 1 /100 WBCS — HIGH (ref 0–0)
NRBC # BLD: 2 /100 WBCS — HIGH (ref 0–0)
NRBC # BLD: 3 /100 WBCS — HIGH (ref 0–0)
NRBC # BLD: 4 /100 WBCS — HIGH (ref 0–0)
NRBC # BLD: 4 /100 WBCS — HIGH (ref 0–0)
NRBC # BLD: 5 /100 WBCS — HIGH (ref 0–0)
NRBC # BLD: 5 /100 WBCS — HIGH (ref 0–0)
NRBC # FLD: 0 K/UL — SIGNIFICANT CHANGE UP (ref 0–0)
NRBC # FLD: 0.02 K/UL — HIGH (ref 0–0)
NRBC # FLD: 0.03 K/UL — HIGH (ref 0–0)
NRBC # FLD: 0.03 K/UL — HIGH (ref 0–0)
NRBC # FLD: 0.04 K/UL — HIGH (ref 0–0)
NRBC # FLD: 0.06 K/UL — HIGH (ref 0–0)
NRBC # FLD: 0.07 K/UL — HIGH (ref 0–0)
NRBC # FLD: 0.08 K/UL — HIGH (ref 0–0)
NRBC # FLD: 0.09 K/UL — HIGH (ref 0–0)
NRBC # FLD: 0.1 K/UL — HIGH (ref 0–0)
NRBC # FLD: 0.1 K/UL — HIGH (ref 0–0)
NRBC # FLD: 0.11 K/UL — HIGH (ref 0–0)
NRBC # FLD: 0.12 K/UL — HIGH (ref 0–0)
NRBC # FLD: 0.15 K/UL — HIGH (ref 0–0)
NRBC # FLD: 0.16 K/UL — HIGH (ref 0–0)
NRBC # FLD: 0.17 K/UL — HIGH (ref 0–0)
NRBC # FLD: 0.19 K/UL — HIGH (ref 0–0)
NRBC # FLD: 0.21 K/UL — HIGH (ref 0–0)
NRBC # FLD: 0.21 K/UL — HIGH (ref 0–0)
NRBC # FLD: 0.22 K/UL — HIGH (ref 0–0)
NRBC # FLD: 0.22 K/UL — HIGH (ref 0–0)
NRBC # FLD: 0.26 K/UL — HIGH (ref 0–0)
NRBC # FLD: 0.27 K/UL — HIGH (ref 0–0)
NRBC # FLD: 0.3 K/UL — HIGH (ref 0–0)
NRBC # FLD: 0.3 K/UL — HIGH (ref 0–0)
NRBC # FLD: 0.35 K/UL — HIGH (ref 0–0)
NRBC # FLD: 0.36 K/UL — HIGH (ref 0–0)
NRBC # FLD: 0.45 K/UL — HIGH (ref 0–0)
NRBC # FLD: 7 % — SIGNIFICANT CHANGE UP
OPIATES UR-MCNC: NEGATIVE — SIGNIFICANT CHANGE UP
OSMOLALITY UR: 564 MOSM/KG — SIGNIFICANT CHANGE UP (ref 50–1200)
OVALOCYTES BLD QL SMEAR: SIGNIFICANT CHANGE UP
OVALOCYTES BLD QL SMEAR: SLIGHT — SIGNIFICANT CHANGE UP
OXYCODONE UR-MCNC: NEGATIVE — SIGNIFICANT CHANGE UP
P JIROVECII DNA L RESP QL NAA+NON-PROBE: NEGATIVE — SIGNIFICANT CHANGE UP
P-ANCA SER-ACNC: NEGATIVE — SIGNIFICANT CHANGE UP
PCP SPEC-MCNC: SIGNIFICANT CHANGE UP
PCP UR-MCNC: NEGATIVE — SIGNIFICANT CHANGE UP
PH UR: 5.5 — SIGNIFICANT CHANGE UP (ref 5–8)
PH UR: 5.5 — SIGNIFICANT CHANGE UP (ref 5–8)
PHOSPHATE SERPL-MCNC: 1.7 MG/DL — LOW (ref 2.5–4.5)
PHOSPHATE SERPL-MCNC: 2 MG/DL — LOW (ref 2.5–4.5)
PHOSPHATE SERPL-MCNC: 2.2 MG/DL — LOW (ref 2.5–4.5)
PHOSPHATE SERPL-MCNC: 2.2 MG/DL — LOW (ref 2.5–4.5)
PHOSPHATE SERPL-MCNC: 2.4 MG/DL — LOW (ref 2.5–4.5)
PHOSPHATE SERPL-MCNC: 2.5 MG/DL — SIGNIFICANT CHANGE UP (ref 2.5–4.5)
PHOSPHATE SERPL-MCNC: 2.6 MG/DL — SIGNIFICANT CHANGE UP (ref 2.5–4.5)
PHOSPHATE SERPL-MCNC: 2.6 MG/DL — SIGNIFICANT CHANGE UP (ref 2.5–4.5)
PHOSPHATE SERPL-MCNC: 2.7 MG/DL — SIGNIFICANT CHANGE UP (ref 2.5–4.5)
PHOSPHATE SERPL-MCNC: 2.8 MG/DL — SIGNIFICANT CHANGE UP (ref 2.5–4.5)
PHOSPHATE SERPL-MCNC: 2.9 MG/DL — SIGNIFICANT CHANGE UP (ref 2.5–4.5)
PHOSPHATE SERPL-MCNC: 3 MG/DL — SIGNIFICANT CHANGE UP (ref 2.5–4.5)
PHOSPHATE SERPL-MCNC: 3.1 MG/DL — SIGNIFICANT CHANGE UP (ref 2.5–4.5)
PHOSPHATE SERPL-MCNC: 3.1 MG/DL — SIGNIFICANT CHANGE UP (ref 2.5–4.5)
PHOSPHATE SERPL-MCNC: 3.2 MG/DL — SIGNIFICANT CHANGE UP (ref 2.5–4.5)
PHOSPHATE SERPL-MCNC: 3.2 MG/DL — SIGNIFICANT CHANGE UP (ref 2.5–4.5)
PHOSPHATE SERPL-MCNC: 3.3 MG/DL — SIGNIFICANT CHANGE UP (ref 2.5–4.5)
PHOSPHATE SERPL-MCNC: 3.4 MG/DL — SIGNIFICANT CHANGE UP (ref 2.5–4.5)
PHOSPHATE SERPL-MCNC: 3.4 MG/DL — SIGNIFICANT CHANGE UP (ref 2.5–4.5)
PHOSPHATE SERPL-MCNC: 3.5 MG/DL — SIGNIFICANT CHANGE UP (ref 2.5–4.5)
PHOSPHATE SERPL-MCNC: 3.8 MG/DL — SIGNIFICANT CHANGE UP (ref 2.5–4.5)
PHOSPHATE SERPL-MCNC: 4 MG/DL — SIGNIFICANT CHANGE UP (ref 2.5–4.5)
PHOSPHATE SERPL-MCNC: 4 MG/DL — SIGNIFICANT CHANGE UP (ref 2.5–4.5)
PHOSPHATE SERPL-MCNC: 4.1 MG/DL — SIGNIFICANT CHANGE UP (ref 2.5–4.5)
PHOSPHATE SERPL-MCNC: 4.2 MG/DL — SIGNIFICANT CHANGE UP (ref 2.5–4.5)
PHOSPHATE SERPL-MCNC: 4.3 MG/DL — SIGNIFICANT CHANGE UP (ref 2.5–4.5)
PHOSPHATE SERPL-MCNC: 4.4 MG/DL — SIGNIFICANT CHANGE UP (ref 2.5–4.5)
PHOSPHATE SERPL-MCNC: 4.4 MG/DL — SIGNIFICANT CHANGE UP (ref 2.5–4.5)
PHOSPHATE SERPL-MCNC: 4.5 MG/DL — SIGNIFICANT CHANGE UP (ref 2.5–4.5)
PHOSPHATE SERPL-MCNC: 4.5 MG/DL — SIGNIFICANT CHANGE UP (ref 2.5–4.5)
PHOSPHATE SERPL-MCNC: 4.6 MG/DL — HIGH (ref 2.5–4.5)
PHOSPHATE SERPL-MCNC: 4.7 MG/DL — HIGH (ref 2.5–4.5)
PHOSPHATE SERPL-MCNC: 4.9 MG/DL — HIGH (ref 2.5–4.5)
PHOSPHATE SERPL-MCNC: 5.1 MG/DL — HIGH (ref 2.5–4.5)
PHOSPHATE SERPL-MCNC: 5.3 MG/DL — HIGH (ref 2.5–4.5)
PHOSPHATE SERPL-MCNC: 5.4 MG/DL — HIGH (ref 2.5–4.5)
PHOSPHATE SERPL-MCNC: 5.7 MG/DL — HIGH (ref 2.5–4.5)
PHOSPHATE SERPL-MCNC: 5.9 MG/DL — HIGH (ref 2.5–4.5)
PHOSPHATE SERPL-MCNC: 6.4 MG/DL — HIGH (ref 2.5–4.5)
PLAT MORPH BLD: ABNORMAL
PLAT MORPH BLD: NORMAL — SIGNIFICANT CHANGE UP
PLAT MORPH BLD: NORMAL — SIGNIFICANT CHANGE UP
PLATELET # BLD AUTO: 19 K/UL — CRITICAL LOW (ref 150–400)
PLATELET # BLD AUTO: 20 K/UL — CRITICAL LOW (ref 150–400)
PLATELET # BLD AUTO: 20 K/UL — CRITICAL LOW (ref 150–400)
PLATELET # BLD AUTO: 21 K/UL — LOW (ref 150–400)
PLATELET # BLD AUTO: 23 K/UL — LOW (ref 150–400)
PLATELET # BLD AUTO: 27 K/UL — LOW (ref 150–400)
PLATELET # BLD AUTO: 29 K/UL — LOW (ref 150–400)
PLATELET # BLD AUTO: 31 K/UL — LOW (ref 150–400)
PLATELET # BLD AUTO: 32 K/UL — LOW (ref 150–400)
PLATELET # BLD AUTO: 33 K/UL — LOW (ref 150–400)
PLATELET # BLD AUTO: 34 K/UL — LOW (ref 150–400)
PLATELET # BLD AUTO: 37 K/UL — LOW (ref 150–400)
PLATELET # BLD AUTO: 38 K/UL — LOW (ref 150–400)
PLATELET # BLD AUTO: 38 K/UL — LOW (ref 150–400)
PLATELET # BLD AUTO: 39 K/UL — LOW (ref 150–400)
PLATELET # BLD AUTO: 42 K/UL — LOW (ref 150–400)
PLATELET # BLD AUTO: 42 K/UL — LOW (ref 150–400)
PLATELET # BLD AUTO: 44 K/UL — LOW (ref 150–400)
PLATELET # BLD AUTO: 44 K/UL — LOW (ref 150–400)
PLATELET # BLD AUTO: 45 K/UL — LOW (ref 150–400)
PLATELET # BLD AUTO: 45 K/UL — LOW (ref 150–400)
PLATELET # BLD AUTO: 46 K/UL — LOW (ref 150–400)
PLATELET # BLD AUTO: 46 K/UL — LOW (ref 150–400)
PLATELET # BLD AUTO: 47 K/UL — LOW (ref 150–400)
PLATELET # BLD AUTO: 50 K/UL — LOW (ref 150–400)
PLATELET # BLD AUTO: 52 K/UL — LOW (ref 150–400)
PLATELET # BLD AUTO: 52 K/UL — LOW (ref 150–400)
PLATELET # BLD AUTO: 55 K/UL — LOW (ref 150–400)
PLATELET # BLD AUTO: 56 K/UL — LOW (ref 150–400)
PLATELET # BLD AUTO: 57 K/UL — LOW (ref 150–400)
PLATELET # BLD AUTO: 57 K/UL — LOW (ref 150–400)
PLATELET # BLD AUTO: 62 K/UL — LOW (ref 150–400)
PLATELET # BLD AUTO: 63 K/UL — LOW (ref 150–400)
PLATELET # BLD AUTO: 63 K/UL — LOW (ref 150–400)
PLATELET # BLD AUTO: 64 K/UL — LOW (ref 150–400)
PLATELET # BLD AUTO: 66 K/UL — LOW (ref 150–400)
PLATELET # BLD AUTO: 67 K/UL — LOW (ref 150–400)
PLATELET # BLD AUTO: 68 K/UL — LOW (ref 150–400)
PLATELET # BLD AUTO: 69 K/UL — LOW (ref 150–400)
PLATELET # BLD AUTO: 70 K/UL — LOW (ref 150–400)
PLATELET # BLD AUTO: 71 K/UL — LOW (ref 150–400)
PLATELET # BLD AUTO: 73 K/UL — LOW (ref 150–400)
PLATELET # BLD AUTO: 75 K/UL — LOW (ref 150–400)
PLATELET # BLD AUTO: 76 K/UL — LOW (ref 150–400)
PLATELET # BLD AUTO: 77 K/UL — LOW (ref 150–400)
PLATELET # BLD AUTO: 79 K/UL — LOW (ref 150–400)
PLATELET # BLD AUTO: 81 K/UL — LOW (ref 150–400)
PLATELET # BLD AUTO: 81 K/UL — LOW (ref 150–400)
PLATELET # BLD AUTO: 82 K/UL — LOW (ref 150–400)
PLATELET # BLD AUTO: 82 K/UL — LOW (ref 150–400)
PLATELET # BLD AUTO: 84 K/UL — LOW (ref 150–400)
PLATELET # BLD AUTO: 93 K/UL — LOW (ref 150–400)
PLATELET COUNT - ESTIMATE: ABNORMAL
POIKILOCYTOSIS BLD QL AUTO: SIGNIFICANT CHANGE UP
POIKILOCYTOSIS BLD QL AUTO: SLIGHT — SIGNIFICANT CHANGE UP
POIKILOCYTOSIS BLD QL AUTO: SLIGHT — SIGNIFICANT CHANGE UP
POLYCHROMASIA BLD QL SMEAR: SIGNIFICANT CHANGE UP
POLYCHROMASIA BLD QL SMEAR: SLIGHT — SIGNIFICANT CHANGE UP
POLYCHROMASIA BLD QL SMEAR: SLIGHT — SIGNIFICANT CHANGE UP
POTASSIUM SERPL-MCNC: 2.8 MMOL/L — CRITICAL LOW (ref 3.5–5.3)
POTASSIUM SERPL-MCNC: 2.9 MMOL/L — CRITICAL LOW (ref 3.5–5.3)
POTASSIUM SERPL-MCNC: 3.1 MMOL/L — LOW (ref 3.5–5.3)
POTASSIUM SERPL-MCNC: 3.1 MMOL/L — LOW (ref 3.5–5.3)
POTASSIUM SERPL-MCNC: 3.3 MMOL/L — LOW (ref 3.5–5.3)
POTASSIUM SERPL-MCNC: 3.5 MMOL/L — SIGNIFICANT CHANGE UP (ref 3.5–5.3)
POTASSIUM SERPL-MCNC: 3.6 MMOL/L — SIGNIFICANT CHANGE UP (ref 3.5–5.3)
POTASSIUM SERPL-MCNC: 3.7 MMOL/L — SIGNIFICANT CHANGE UP (ref 3.5–5.3)
POTASSIUM SERPL-MCNC: 3.8 MMOL/L — SIGNIFICANT CHANGE UP (ref 3.5–5.3)
POTASSIUM SERPL-MCNC: 3.9 MMOL/L — SIGNIFICANT CHANGE UP (ref 3.5–5.3)
POTASSIUM SERPL-MCNC: 4 MMOL/L — SIGNIFICANT CHANGE UP (ref 3.5–5.3)
POTASSIUM SERPL-MCNC: 4.1 MMOL/L — SIGNIFICANT CHANGE UP (ref 3.5–5.3)
POTASSIUM SERPL-MCNC: 4.1 MMOL/L — SIGNIFICANT CHANGE UP (ref 3.5–5.3)
POTASSIUM SERPL-MCNC: 4.2 MMOL/L — SIGNIFICANT CHANGE UP (ref 3.5–5.3)
POTASSIUM SERPL-MCNC: 4.3 MMOL/L — SIGNIFICANT CHANGE UP (ref 3.5–5.3)
POTASSIUM SERPL-MCNC: 4.4 MMOL/L — SIGNIFICANT CHANGE UP (ref 3.5–5.3)
POTASSIUM SERPL-MCNC: 4.4 MMOL/L — SIGNIFICANT CHANGE UP (ref 3.5–5.3)
POTASSIUM SERPL-MCNC: 4.5 MMOL/L — SIGNIFICANT CHANGE UP (ref 3.5–5.3)
POTASSIUM SERPL-MCNC: 4.9 MMOL/L — SIGNIFICANT CHANGE UP (ref 3.5–5.3)
POTASSIUM SERPL-MCNC: 4.9 MMOL/L — SIGNIFICANT CHANGE UP (ref 3.5–5.3)
POTASSIUM SERPL-MCNC: 5.1 MMOL/L — SIGNIFICANT CHANGE UP (ref 3.5–5.3)
POTASSIUM SERPL-MCNC: 5.1 MMOL/L — SIGNIFICANT CHANGE UP (ref 3.5–5.3)
POTASSIUM SERPL-MCNC: 5.2 MMOL/L — SIGNIFICANT CHANGE UP (ref 3.5–5.3)
POTASSIUM SERPL-SCNC: 2.8 MMOL/L — CRITICAL LOW (ref 3.5–5.3)
POTASSIUM SERPL-SCNC: 2.9 MMOL/L — CRITICAL LOW (ref 3.5–5.3)
POTASSIUM SERPL-SCNC: 3.1 MMOL/L — LOW (ref 3.5–5.3)
POTASSIUM SERPL-SCNC: 3.1 MMOL/L — LOW (ref 3.5–5.3)
POTASSIUM SERPL-SCNC: 3.3 MMOL/L — LOW (ref 3.5–5.3)
POTASSIUM SERPL-SCNC: 3.5 MMOL/L — SIGNIFICANT CHANGE UP (ref 3.5–5.3)
POTASSIUM SERPL-SCNC: 3.6 MMOL/L — SIGNIFICANT CHANGE UP (ref 3.5–5.3)
POTASSIUM SERPL-SCNC: 3.7 MMOL/L — SIGNIFICANT CHANGE UP (ref 3.5–5.3)
POTASSIUM SERPL-SCNC: 3.8 MMOL/L — SIGNIFICANT CHANGE UP (ref 3.5–5.3)
POTASSIUM SERPL-SCNC: 3.9 MMOL/L — SIGNIFICANT CHANGE UP (ref 3.5–5.3)
POTASSIUM SERPL-SCNC: 4 MMOL/L — SIGNIFICANT CHANGE UP (ref 3.5–5.3)
POTASSIUM SERPL-SCNC: 4.1 MMOL/L — SIGNIFICANT CHANGE UP (ref 3.5–5.3)
POTASSIUM SERPL-SCNC: 4.1 MMOL/L — SIGNIFICANT CHANGE UP (ref 3.5–5.3)
POTASSIUM SERPL-SCNC: 4.2 MMOL/L — SIGNIFICANT CHANGE UP (ref 3.5–5.3)
POTASSIUM SERPL-SCNC: 4.3 MMOL/L — SIGNIFICANT CHANGE UP (ref 3.5–5.3)
POTASSIUM SERPL-SCNC: 4.4 MMOL/L — SIGNIFICANT CHANGE UP (ref 3.5–5.3)
POTASSIUM SERPL-SCNC: 4.4 MMOL/L — SIGNIFICANT CHANGE UP (ref 3.5–5.3)
POTASSIUM SERPL-SCNC: 4.5 MMOL/L — SIGNIFICANT CHANGE UP (ref 3.5–5.3)
POTASSIUM SERPL-SCNC: 4.9 MMOL/L — SIGNIFICANT CHANGE UP (ref 3.5–5.3)
POTASSIUM SERPL-SCNC: 4.9 MMOL/L — SIGNIFICANT CHANGE UP (ref 3.5–5.3)
POTASSIUM SERPL-SCNC: 5.1 MMOL/L — SIGNIFICANT CHANGE UP (ref 3.5–5.3)
POTASSIUM SERPL-SCNC: 5.1 MMOL/L — SIGNIFICANT CHANGE UP (ref 3.5–5.3)
POTASSIUM SERPL-SCNC: 5.2 MMOL/L — SIGNIFICANT CHANGE UP (ref 3.5–5.3)
POTASSIUM UR-SCNC: 50.1 MMOL/L — SIGNIFICANT CHANGE UP
PROT ?TM UR-MCNC: 64 MG/DL — SIGNIFICANT CHANGE UP
PROT SERPL-MCNC: 5.9 G/DL — LOW (ref 6–8.3)
PROT SERPL-MCNC: 6 G/DL — SIGNIFICANT CHANGE UP (ref 6–8.3)
PROT SERPL-MCNC: 6.1 G/DL — SIGNIFICANT CHANGE UP (ref 6–8.3)
PROT SERPL-MCNC: 6.2 G/DL — SIGNIFICANT CHANGE UP (ref 6–8.3)
PROT SERPL-MCNC: 6.3 G/DL — SIGNIFICANT CHANGE UP (ref 6–8.3)
PROT SERPL-MCNC: 6.4 G/DL — SIGNIFICANT CHANGE UP (ref 6–8.3)
PROT SERPL-MCNC: 6.5 G/DL — SIGNIFICANT CHANGE UP (ref 6–8.3)
PROT SERPL-MCNC: 6.6 G/DL — SIGNIFICANT CHANGE UP (ref 6–8.3)
PROT SERPL-MCNC: 6.7 G/DL — SIGNIFICANT CHANGE UP (ref 6–8.3)
PROT SERPL-MCNC: 6.8 G/DL — SIGNIFICANT CHANGE UP (ref 6–8.3)
PROT SERPL-MCNC: 6.9 G/DL — SIGNIFICANT CHANGE UP (ref 6–8.3)
PROT SERPL-MCNC: 7 G/DL — SIGNIFICANT CHANGE UP (ref 6–8.3)
PROT SERPL-MCNC: 7 G/DL — SIGNIFICANT CHANGE UP (ref 6–8.3)
PROT UR-MCNC: 100 MG/DL
PROT UR-MCNC: 30 MG/DL
PROT/CREAT UR-RTO: 0.3 RATIO — HIGH (ref 0–0.2)
PROTHROM AB SERPL-ACNC: 103 SEC — HIGH (ref 9.5–13)
PROTHROM AB SERPL-ACNC: 27 SEC — HIGH (ref 9.5–13)
PROTHROM AB SERPL-ACNC: 27.2 SEC — HIGH (ref 9.5–13)
PROTHROM AB SERPL-ACNC: 30.3 SEC — HIGH (ref 9.5–13)
PROTHROM AB SERPL-ACNC: 30.7 SEC — HIGH (ref 9.5–13)
PROTHROM AB SERPL-ACNC: 31.8 SEC — HIGH (ref 9.5–13)
PROTHROM AB SERPL-ACNC: 33 SEC — HIGH (ref 9.5–13)
PROTHROM AB SERPL-ACNC: 33.1 SEC — HIGH (ref 9.5–13)
PROTHROM AB SERPL-ACNC: 33.2 SEC — HIGH (ref 9.5–13)
PROTHROM AB SERPL-ACNC: 34.1 SEC — HIGH (ref 9.5–13)
PROTHROM AB SERPL-ACNC: 35.1 SEC — HIGH (ref 9.5–13)
PROTHROM AB SERPL-ACNC: 36.2 SEC — HIGH (ref 9.5–13)
PROTHROM AB SERPL-ACNC: 36.5 SEC — HIGH (ref 9.5–13)
PROTHROM AB SERPL-ACNC: 36.9 SEC — HIGH (ref 9.5–13)
PROTHROM AB SERPL-ACNC: 37.8 SEC — HIGH (ref 9.5–13)
PROTHROM AB SERPL-ACNC: 38.1 SEC — HIGH (ref 9.5–13)
PROTHROM AB SERPL-ACNC: 38.3 SEC — HIGH (ref 9.5–13)
PROTHROM AB SERPL-ACNC: 38.7 SEC — HIGH (ref 9.5–13)
PROTHROM AB SERPL-ACNC: 38.7 SEC — HIGH (ref 9.5–13)
PROTHROM AB SERPL-ACNC: 40.4 SEC — HIGH (ref 9.5–13)
PROTHROM AB SERPL-ACNC: 40.8 SEC — HIGH (ref 9.5–13)
PROTHROM AB SERPL-ACNC: 42.1 SEC — HIGH (ref 9.5–13)
PROTHROM AB SERPL-ACNC: 42.2 SEC — HIGH (ref 9.5–13)
PROTHROM AB SERPL-ACNC: 45.8 SEC — HIGH (ref 9.5–13)
PROTHROM AB SERPL-ACNC: 45.8 SEC — HIGH (ref 9.5–13)
PROTHROM AB SERPL-ACNC: 47.9 SEC — HIGH (ref 9.5–13)
PROTHROM AB SERPL-ACNC: 49.2 SEC — HIGH (ref 9.5–13)
PROTHROM AB SERPL-ACNC: 49.4 SEC — HIGH (ref 9.5–13)
PROTHROM AB SERPL-ACNC: 50.8 SEC — HIGH (ref 9.5–13)
PROTHROM AB SERPL-ACNC: 51.8 SEC — HIGH (ref 9.5–13)
PROTHROM AB SERPL-ACNC: 51.9 SEC — HIGH (ref 9.5–13)
PROTHROM AB SERPL-ACNC: 52 SEC — HIGH (ref 9.5–13)
PROTHROM AB SERPL-ACNC: 52.8 SEC — HIGH (ref 9.5–13)
PROTHROM AB SERPL-ACNC: 54.4 SEC — HIGH (ref 9.5–13)
PROTHROM AB SERPL-ACNC: 55.7 SEC — HIGH (ref 9.5–13)
PROTHROM AB SERPL-ACNC: 61.3 SEC — HIGH (ref 9.5–13)
PROTHROM AB SERPL-ACNC: 65.3 SEC — HIGH (ref 9.5–13)
PROTHROM AB SERPL-ACNC: 89.2 SEC — HIGH (ref 9.5–13)
QUANT TB PLUS MITOGEN MINUS NIL: 2.9 IU/ML — SIGNIFICANT CHANGE UP
RAPID RVP RESULT: SIGNIFICANT CHANGE UP
RBC # BLD: 2.11 M/UL — LOW (ref 4.2–5.8)
RBC # BLD: 2.16 M/UL — LOW (ref 4.2–5.8)
RBC # BLD: 2.19 M/UL — LOW (ref 4.2–5.8)
RBC # BLD: 2.21 M/UL — LOW (ref 4.2–5.8)
RBC # BLD: 2.21 M/UL — LOW (ref 4.2–5.8)
RBC # BLD: 2.22 M/UL — LOW (ref 4.2–5.8)
RBC # BLD: 2.25 M/UL — LOW (ref 4.2–5.8)
RBC # BLD: 2.25 M/UL — LOW (ref 4.2–5.8)
RBC # BLD: 2.27 M/UL — LOW (ref 4.2–5.8)
RBC # BLD: 2.29 M/UL — LOW (ref 4.2–5.8)
RBC # BLD: 2.3 M/UL — LOW (ref 4.2–5.8)
RBC # BLD: 2.31 M/UL — LOW (ref 4.2–5.8)
RBC # BLD: 2.31 M/UL — LOW (ref 4.2–5.8)
RBC # BLD: 2.34 M/UL — LOW (ref 4.2–5.8)
RBC # BLD: 2.36 M/UL — LOW (ref 4.2–5.8)
RBC # BLD: 2.38 M/UL — LOW (ref 4.2–5.8)
RBC # BLD: 2.39 M/UL — LOW (ref 4.2–5.8)
RBC # BLD: 2.39 M/UL — LOW (ref 4.2–5.8)
RBC # BLD: 2.4 M/UL — LOW (ref 4.2–5.8)
RBC # BLD: 2.41 M/UL — LOW (ref 4.2–5.8)
RBC # BLD: 2.42 M/UL — LOW (ref 4.2–5.8)
RBC # BLD: 2.43 M/UL — LOW (ref 4.2–5.8)
RBC # BLD: 2.43 M/UL — LOW (ref 4.2–5.8)
RBC # BLD: 2.44 M/UL — LOW (ref 4.2–5.8)
RBC # BLD: 2.46 M/UL — LOW (ref 4.2–5.8)
RBC # BLD: 2.46 M/UL — LOW (ref 4.2–5.8)
RBC # BLD: 2.48 M/UL — LOW (ref 4.2–5.8)
RBC # BLD: 2.49 M/UL — LOW (ref 4.2–5.8)
RBC # BLD: 2.49 M/UL — LOW (ref 4.2–5.8)
RBC # BLD: 2.51 M/UL — LOW (ref 4.2–5.8)
RBC # BLD: 2.52 M/UL — LOW (ref 4.2–5.8)
RBC # BLD: 2.52 M/UL — LOW (ref 4.2–5.8)
RBC # BLD: 2.53 M/UL — LOW (ref 4.2–5.8)
RBC # BLD: 2.55 M/UL — LOW (ref 4.2–5.8)
RBC # BLD: 2.55 M/UL — LOW (ref 4.2–5.8)
RBC # BLD: 2.56 M/UL — LOW (ref 4.2–5.8)
RBC # BLD: 2.56 M/UL — LOW (ref 4.2–5.8)
RBC # BLD: 2.58 M/UL — LOW (ref 4.2–5.8)
RBC # BLD: 2.59 M/UL — LOW (ref 4.2–5.8)
RBC # BLD: 2.63 M/UL — LOW (ref 4.2–5.8)
RBC # BLD: 2.63 M/UL — LOW (ref 4.2–5.8)
RBC # BLD: 2.64 M/UL — LOW (ref 4.2–5.8)
RBC # BLD: 2.65 M/UL — LOW (ref 4.2–5.8)
RBC # BLD: 2.65 M/UL — LOW (ref 4.2–5.8)
RBC # BLD: 2.68 M/UL — LOW (ref 4.2–5.8)
RBC # BLD: 2.68 M/UL — LOW (ref 4.2–5.8)
RBC # FLD: 23.7 % — HIGH (ref 10.3–14.5)
RBC # FLD: 23.8 % — HIGH (ref 10.3–14.5)
RBC # FLD: 23.9 % — HIGH (ref 10.3–14.5)
RBC # FLD: 23.9 % — HIGH (ref 10.3–14.5)
RBC # FLD: 24.8 % — HIGH (ref 10.3–14.5)
RBC # FLD: 25 % — HIGH (ref 10.3–14.5)
RBC # FLD: 25 % — HIGH (ref 10.3–14.5)
RBC # FLD: 25.2 % — HIGH (ref 10.3–14.5)
RBC # FLD: 25.4 % — HIGH (ref 10.3–14.5)
RBC # FLD: 25.5 % — HIGH (ref 10.3–14.5)
RBC # FLD: 25.7 % — HIGH (ref 10.3–14.5)
RBC # FLD: 25.7 % — HIGH (ref 10.3–14.5)
RBC # FLD: 25.8 % — HIGH (ref 10.3–14.5)
RBC # FLD: 25.8 % — HIGH (ref 10.3–14.5)
RBC # FLD: 25.9 % — HIGH (ref 10.3–14.5)
RBC # FLD: 26 % — HIGH (ref 10.3–14.5)
RBC # FLD: 26.1 % — HIGH (ref 10.3–14.5)
RBC # FLD: 26.1 % — HIGH (ref 10.3–14.5)
RBC # FLD: 26.2 % — HIGH (ref 10.3–14.5)
RBC # FLD: 26.2 % — HIGH (ref 10.3–14.5)
RBC # FLD: 26.3 % — HIGH (ref 10.3–14.5)
RBC # FLD: 26.3 % — HIGH (ref 10.3–14.5)
RBC # FLD: 26.5 % — HIGH (ref 10.3–14.5)
RBC # FLD: 26.6 % — HIGH (ref 10.3–14.5)
RBC # FLD: 26.6 % — HIGH (ref 10.3–14.5)
RBC # FLD: 26.7 % — HIGH (ref 10.3–14.5)
RBC # FLD: 26.8 % — HIGH (ref 10.3–14.5)
RBC # FLD: 26.9 % — HIGH (ref 10.3–14.5)
RBC # FLD: 27 % — HIGH (ref 10.3–14.5)
RBC # FLD: 27.1 % — HIGH (ref 10.3–14.5)
RBC # FLD: 27.3 % — HIGH (ref 10.3–14.5)
RBC # FLD: 27.5 % — HIGH (ref 10.3–14.5)
RBC BLD AUTO: ABNORMAL
RBC CASTS # UR COMP ASSIST: 111 /HPF — HIGH (ref 0–4)
RBC CASTS # UR COMP ASSIST: 629 /HPF — HIGH (ref 0–4)
RCV VOL RI: SIGNIFICANT CHANGE UP CELLS/UL (ref 0–5)
REVIEW: SIGNIFICANT CHANGE UP
REVIEW: SIGNIFICANT CHANGE UP
RF IGA SER-ACNC: 5 U — SIGNIFICANT CHANGE UP
RF IGG SER-ACNC: 6 U — SIGNIFICANT CHANGE UP
RF IGM SER-ACNC: 7 U — HIGH
RF+CCP IGG SER-IMP: NEGATIVE — SIGNIFICANT CHANGE UP
RH IG SCN BLD-IMP: POSITIVE — SIGNIFICANT CHANGE UP
RSV RNA NPH QL NAA+NON-PROBE: SIGNIFICANT CHANGE UP
RSV RNA NPH QL NAA+NON-PROBE: SIGNIFICANT CHANGE UP
RSV RNA SPEC QL NAA+PROBE: SIGNIFICANT CHANGE UP
RV+EV RNA SPEC QL NAA+PROBE: SIGNIFICANT CHANGE UP
S AUREUS DNA NOSE QL NAA+PROBE: DETECTED
S PNEUM AG UR QL: NEGATIVE — SIGNIFICANT CHANGE UP
SALICYLATES SERPL-MCNC: <0.3 MG/DL — LOW (ref 15–30)
SARS-COV-2 RNA SPEC QL NAA+PROBE: SIGNIFICANT CHANGE UP
SMOOTH MUSCLE AB SER-ACNC: ABNORMAL
SMUDGE CELLS # BLD: PRESENT — SIGNIFICANT CHANGE UP
SMUDGE CELLS # BLD: PRESENT — SIGNIFICANT CHANGE UP
SODIUM SERPL-SCNC: 128 MMOL/L — LOW (ref 135–145)
SODIUM SERPL-SCNC: 130 MMOL/L — LOW (ref 135–145)
SODIUM SERPL-SCNC: 131 MMOL/L — LOW (ref 135–145)
SODIUM SERPL-SCNC: 132 MMOL/L — LOW (ref 135–145)
SODIUM SERPL-SCNC: 133 MMOL/L — LOW (ref 135–145)
SODIUM SERPL-SCNC: 134 MMOL/L — LOW (ref 135–145)
SODIUM SERPL-SCNC: 135 MMOL/L — SIGNIFICANT CHANGE UP (ref 135–145)
SODIUM SERPL-SCNC: 136 MMOL/L — SIGNIFICANT CHANGE UP (ref 135–145)
SODIUM SERPL-SCNC: 137 MMOL/L — SIGNIFICANT CHANGE UP (ref 135–145)
SODIUM UR-SCNC: <20 MMOL/L — SIGNIFICANT CHANGE UP
SP GR SPEC: 1.02 — SIGNIFICANT CHANGE UP (ref 1–1.03)
SP GR SPEC: 1.03 — SIGNIFICANT CHANGE UP (ref 1–1.03)
SPECIMEN SOURCE: SIGNIFICANT CHANGE UP
SQUAMOUS # UR AUTO: 6 /HPF — HIGH (ref 0–5)
SQUAMOUS # UR AUTO: 7 /HPF — HIGH (ref 0–5)
T PALLIDUM AB TITR SER: NEGATIVE — SIGNIFICANT CHANGE UP
T3FREE SERPL-MCNC: 1.37 PG/ML — LOW (ref 2–4.4)
T4 FREE SERPL-MCNC: 1.1 NG/DL — SIGNIFICANT CHANGE UP (ref 0.9–1.8)
T4 FREE SERPL-MCNC: 1.1 NG/DL — SIGNIFICANT CHANGE UP (ref 0.9–1.8)
TARGETS BLD QL SMEAR: SLIGHT — SIGNIFICANT CHANGE UP
TARGETS BLD QL SMEAR: SLIGHT — SIGNIFICANT CHANGE UP
THC UR QL: NEGATIVE — SIGNIFICANT CHANGE UP
TIBC SERPL-MCNC: 169 UG/DL — LOW (ref 220–430)
TIBC SERPL-MCNC: 182 UG/DL — LOW (ref 220–430)
TOTAL HEM COMP BLD-ACNC: 30 U/ML — LOW (ref 42–95)
TOTAL NUCLEATED CELL COUNT, BODY FLUID: SIGNIFICANT CHANGE UP CELLS/UL (ref 0–5)
TOXICOLOGY SCREEN, DRUGS OF ABUSE, SERUM RESULT: SIGNIFICANT CHANGE UP
TRIGL SERPL-MCNC: 102 MG/DL — SIGNIFICANT CHANGE UP
TRIGL SERPL-MCNC: 155 MG/DL — HIGH
TRIGL SERPL-MCNC: 237 MG/DL — HIGH
TSH SERPL-MCNC: 0.1 UIU/ML — LOW (ref 0.27–4.2)
UIBC SERPL-MCNC: 120 UG/DL — SIGNIFICANT CHANGE UP (ref 110–370)
UIBC SERPL-MCNC: 132 UG/DL — SIGNIFICANT CHANGE UP (ref 110–370)
UROBILINOGEN FLD QL: 0.2 MG/DL — SIGNIFICANT CHANGE UP (ref 0.2–1)
UROBILINOGEN FLD QL: 1 MG/DL — SIGNIFICANT CHANGE UP (ref 0.2–1)
UUN UR-MCNC: 660.2 MG/DL — SIGNIFICANT CHANGE UP
VANCOMYCIN FLD-MCNC: 14 UG/ML — SIGNIFICANT CHANGE UP
VANCOMYCIN FLD-MCNC: 20.1 UG/ML — SIGNIFICANT CHANGE UP
VANCOMYCIN FLD-MCNC: 6.8 UG/ML — SIGNIFICANT CHANGE UP
VANCOMYCIN TROUGH SERPL-MCNC: 12.3 UG/ML — SIGNIFICANT CHANGE UP (ref 10–20)
VARIANT LYMPHS # BLD: 0.9 % — SIGNIFICANT CHANGE UP (ref 0–6)
VIRUS SPEC CULT: SIGNIFICANT CHANGE UP
VIT B12 SERPL-MCNC: 1178 PG/ML — HIGH (ref 200–900)
VIT B12 SERPL-MCNC: 1233 PG/ML — HIGH (ref 200–900)
WBC # BLD: 11.49 K/UL — HIGH (ref 3.8–10.5)
WBC # BLD: 12.36 K/UL — HIGH (ref 3.8–10.5)
WBC # BLD: 12.58 K/UL — HIGH (ref 3.8–10.5)
WBC # BLD: 13.11 K/UL — HIGH (ref 3.8–10.5)
WBC # BLD: 13.13 K/UL — HIGH (ref 3.8–10.5)
WBC # BLD: 13.24 K/UL — HIGH (ref 3.8–10.5)
WBC # BLD: 13.41 K/UL — HIGH (ref 3.8–10.5)
WBC # BLD: 13.44 K/UL — HIGH (ref 3.8–10.5)
WBC # BLD: 13.53 K/UL — HIGH (ref 3.8–10.5)
WBC # BLD: 14.04 K/UL — HIGH (ref 3.8–10.5)
WBC # BLD: 14.06 K/UL — HIGH (ref 3.8–10.5)
WBC # BLD: 14.17 K/UL — HIGH (ref 3.8–10.5)
WBC # BLD: 14.35 K/UL — HIGH (ref 3.8–10.5)
WBC # BLD: 14.36 K/UL — HIGH (ref 3.8–10.5)
WBC # BLD: 14.42 K/UL — HIGH (ref 3.8–10.5)
WBC # BLD: 14.76 K/UL — HIGH (ref 3.8–10.5)
WBC # BLD: 14.96 K/UL — HIGH (ref 3.8–10.5)
WBC # BLD: 15.51 K/UL — HIGH (ref 3.8–10.5)
WBC # BLD: 15.61 K/UL — HIGH (ref 3.8–10.5)
WBC # BLD: 15.85 K/UL — HIGH (ref 3.8–10.5)
WBC # BLD: 16.14 K/UL — HIGH (ref 3.8–10.5)
WBC # BLD: 16.15 K/UL — HIGH (ref 3.8–10.5)
WBC # BLD: 16.49 K/UL — HIGH (ref 3.8–10.5)
WBC # BLD: 16.6 K/UL — HIGH (ref 3.8–10.5)
WBC # BLD: 16.81 K/UL — HIGH (ref 3.8–10.5)
WBC # BLD: 16.86 K/UL — HIGH (ref 3.8–10.5)
WBC # BLD: 18.63 K/UL — HIGH (ref 3.8–10.5)
WBC # BLD: 18.72 K/UL — HIGH (ref 3.8–10.5)
WBC # BLD: 19.38 K/UL — HIGH (ref 3.8–10.5)
WBC # BLD: 19.83 K/UL — HIGH (ref 3.8–10.5)
WBC # BLD: 20.02 K/UL — HIGH (ref 3.8–10.5)
WBC # BLD: 20.1 K/UL — HIGH (ref 3.8–10.5)
WBC # BLD: 20.34 K/UL — HIGH (ref 3.8–10.5)
WBC # BLD: 20.72 K/UL — HIGH (ref 3.8–10.5)
WBC # BLD: 21.93 K/UL — HIGH (ref 3.8–10.5)
WBC # BLD: 22.35 K/UL — HIGH (ref 3.8–10.5)
WBC # BLD: 5.22 K/UL — SIGNIFICANT CHANGE UP (ref 3.8–10.5)
WBC # BLD: 5.77 K/UL — SIGNIFICANT CHANGE UP (ref 3.8–10.5)
WBC # BLD: 6.02 K/UL — SIGNIFICANT CHANGE UP (ref 3.8–10.5)
WBC # BLD: 6.7 K/UL — SIGNIFICANT CHANGE UP (ref 3.8–10.5)
WBC # BLD: 6.91 K/UL — SIGNIFICANT CHANGE UP (ref 3.8–10.5)
WBC # BLD: 6.95 K/UL — SIGNIFICANT CHANGE UP (ref 3.8–10.5)
WBC # BLD: 7.13 K/UL — SIGNIFICANT CHANGE UP (ref 3.8–10.5)
WBC # BLD: 7.5 K/UL — SIGNIFICANT CHANGE UP (ref 3.8–10.5)
WBC # BLD: 7.55 K/UL — SIGNIFICANT CHANGE UP (ref 3.8–10.5)
WBC # BLD: 7.67 K/UL — SIGNIFICANT CHANGE UP (ref 3.8–10.5)
WBC # BLD: 7.69 K/UL — SIGNIFICANT CHANGE UP (ref 3.8–10.5)
WBC # BLD: 8.25 K/UL — SIGNIFICANT CHANGE UP (ref 3.8–10.5)
WBC # BLD: 8.32 K/UL — SIGNIFICANT CHANGE UP (ref 3.8–10.5)
WBC # BLD: 8.38 K/UL — SIGNIFICANT CHANGE UP (ref 3.8–10.5)
WBC # BLD: 8.4 K/UL — SIGNIFICANT CHANGE UP (ref 3.8–10.5)
WBC # BLD: 8.43 K/UL — SIGNIFICANT CHANGE UP (ref 3.8–10.5)
WBC # BLD: 8.43 K/UL — SIGNIFICANT CHANGE UP (ref 3.8–10.5)
WBC # BLD: 8.79 K/UL — SIGNIFICANT CHANGE UP (ref 3.8–10.5)
WBC # FLD AUTO: 11.49 K/UL — HIGH (ref 3.8–10.5)
WBC # FLD AUTO: 12.36 K/UL — HIGH (ref 3.8–10.5)
WBC # FLD AUTO: 12.58 K/UL — HIGH (ref 3.8–10.5)
WBC # FLD AUTO: 13.11 K/UL — HIGH (ref 3.8–10.5)
WBC # FLD AUTO: 13.13 K/UL — HIGH (ref 3.8–10.5)
WBC # FLD AUTO: 13.24 K/UL — HIGH (ref 3.8–10.5)
WBC # FLD AUTO: 13.41 K/UL — HIGH (ref 3.8–10.5)
WBC # FLD AUTO: 13.44 K/UL — HIGH (ref 3.8–10.5)
WBC # FLD AUTO: 13.53 K/UL — HIGH (ref 3.8–10.5)
WBC # FLD AUTO: 14.04 K/UL — HIGH (ref 3.8–10.5)
WBC # FLD AUTO: 14.06 K/UL — HIGH (ref 3.8–10.5)
WBC # FLD AUTO: 14.17 K/UL — HIGH (ref 3.8–10.5)
WBC # FLD AUTO: 14.35 K/UL — HIGH (ref 3.8–10.5)
WBC # FLD AUTO: 14.36 K/UL — HIGH (ref 3.8–10.5)
WBC # FLD AUTO: 14.42 K/UL — HIGH (ref 3.8–10.5)
WBC # FLD AUTO: 14.76 K/UL — HIGH (ref 3.8–10.5)
WBC # FLD AUTO: 14.96 K/UL — HIGH (ref 3.8–10.5)
WBC # FLD AUTO: 15.51 K/UL — HIGH (ref 3.8–10.5)
WBC # FLD AUTO: 15.61 K/UL — HIGH (ref 3.8–10.5)
WBC # FLD AUTO: 15.85 K/UL — HIGH (ref 3.8–10.5)
WBC # FLD AUTO: 16.14 K/UL — HIGH (ref 3.8–10.5)
WBC # FLD AUTO: 16.15 K/UL — HIGH (ref 3.8–10.5)
WBC # FLD AUTO: 16.49 K/UL — HIGH (ref 3.8–10.5)
WBC # FLD AUTO: 16.6 K/UL — HIGH (ref 3.8–10.5)
WBC # FLD AUTO: 16.81 K/UL — HIGH (ref 3.8–10.5)
WBC # FLD AUTO: 16.86 K/UL — HIGH (ref 3.8–10.5)
WBC # FLD AUTO: 18.63 K/UL — HIGH (ref 3.8–10.5)
WBC # FLD AUTO: 18.72 K/UL — HIGH (ref 3.8–10.5)
WBC # FLD AUTO: 19.38 K/UL — HIGH (ref 3.8–10.5)
WBC # FLD AUTO: 19.83 K/UL — HIGH (ref 3.8–10.5)
WBC # FLD AUTO: 20.02 K/UL — HIGH (ref 3.8–10.5)
WBC # FLD AUTO: 20.1 K/UL — HIGH (ref 3.8–10.5)
WBC # FLD AUTO: 20.34 K/UL — HIGH (ref 3.8–10.5)
WBC # FLD AUTO: 20.72 K/UL — HIGH (ref 3.8–10.5)
WBC # FLD AUTO: 21.93 K/UL — HIGH (ref 3.8–10.5)
WBC # FLD AUTO: 22.35 K/UL — HIGH (ref 3.8–10.5)
WBC # FLD AUTO: 5.22 K/UL — SIGNIFICANT CHANGE UP (ref 3.8–10.5)
WBC # FLD AUTO: 5.77 K/UL — SIGNIFICANT CHANGE UP (ref 3.8–10.5)
WBC # FLD AUTO: 6.02 K/UL — SIGNIFICANT CHANGE UP (ref 3.8–10.5)
WBC # FLD AUTO: 6.7 K/UL — SIGNIFICANT CHANGE UP (ref 3.8–10.5)
WBC # FLD AUTO: 6.91 K/UL — SIGNIFICANT CHANGE UP (ref 3.8–10.5)
WBC # FLD AUTO: 6.95 K/UL — SIGNIFICANT CHANGE UP (ref 3.8–10.5)
WBC # FLD AUTO: 7.13 K/UL — SIGNIFICANT CHANGE UP (ref 3.8–10.5)
WBC # FLD AUTO: 7.5 K/UL — SIGNIFICANT CHANGE UP (ref 3.8–10.5)
WBC # FLD AUTO: 7.55 K/UL — SIGNIFICANT CHANGE UP (ref 3.8–10.5)
WBC # FLD AUTO: 7.67 K/UL — SIGNIFICANT CHANGE UP (ref 3.8–10.5)
WBC # FLD AUTO: 7.69 K/UL — SIGNIFICANT CHANGE UP (ref 3.8–10.5)
WBC # FLD AUTO: 8.25 K/UL — SIGNIFICANT CHANGE UP (ref 3.8–10.5)
WBC # FLD AUTO: 8.32 K/UL — SIGNIFICANT CHANGE UP (ref 3.8–10.5)
WBC # FLD AUTO: 8.38 K/UL — SIGNIFICANT CHANGE UP (ref 3.8–10.5)
WBC # FLD AUTO: 8.4 K/UL — SIGNIFICANT CHANGE UP (ref 3.8–10.5)
WBC # FLD AUTO: 8.43 K/UL — SIGNIFICANT CHANGE UP (ref 3.8–10.5)
WBC # FLD AUTO: 8.43 K/UL — SIGNIFICANT CHANGE UP (ref 3.8–10.5)
WBC # FLD AUTO: 8.79 K/UL — SIGNIFICANT CHANGE UP (ref 3.8–10.5)
WBC UR QL: 12 /HPF — HIGH (ref 0–5)
WBC UR QL: 2 /HPF — SIGNIFICANT CHANGE UP (ref 0–5)

## 2024-01-01 PROCEDURE — 99232 SBSQ HOSP IP/OBS MODERATE 35: CPT

## 2024-01-01 PROCEDURE — 93971 EXTREMITY STUDY: CPT | Mod: 26,LT

## 2024-01-01 PROCEDURE — 99291 CRITICAL CARE FIRST HOUR: CPT | Mod: GC

## 2024-01-01 PROCEDURE — 99232 SBSQ HOSP IP/OBS MODERATE 35: CPT | Mod: GC,25

## 2024-01-01 PROCEDURE — 36800 INSERTION OF CANNULA: CPT | Mod: GC,59

## 2024-01-01 PROCEDURE — 31645 BRNCHSC W/THER ASPIR 1ST: CPT | Mod: GC

## 2024-01-01 PROCEDURE — 71045 X-RAY EXAM CHEST 1 VIEW: CPT | Mod: 26

## 2024-01-01 PROCEDURE — 90945 DIALYSIS ONE EVALUATION: CPT | Mod: GC

## 2024-01-01 PROCEDURE — 88305 TISSUE EXAM BY PATHOLOGIST: CPT | Mod: 26

## 2024-01-01 PROCEDURE — 76604 US EXAM CHEST: CPT | Mod: 26

## 2024-01-01 PROCEDURE — 93970 EXTREMITY STUDY: CPT | Mod: 26

## 2024-01-01 PROCEDURE — 76604 US EXAM CHEST: CPT | Mod: 26,GC

## 2024-01-01 PROCEDURE — 95720 EEG PHY/QHP EA INCR W/VEEG: CPT

## 2024-01-01 PROCEDURE — 93308 TTE F-UP OR LMTD: CPT | Mod: 26,GC

## 2024-01-01 PROCEDURE — 93308 TTE F-UP OR LMTD: CPT | Mod: 26

## 2024-01-01 PROCEDURE — 93975 VASCULAR STUDY: CPT | Mod: 26

## 2024-01-01 PROCEDURE — 71045 X-RAY EXAM CHEST 1 VIEW: CPT | Mod: 26,76

## 2024-01-01 PROCEDURE — 99291 CRITICAL CARE FIRST HOUR: CPT | Mod: GC,25

## 2024-01-01 PROCEDURE — 31624 DX BRONCHOSCOPE/LAVAGE: CPT | Mod: GC

## 2024-01-01 PROCEDURE — 88112 CYTOPATH CELL ENHANCE TECH: CPT | Mod: 26

## 2024-01-01 PROCEDURE — 99292 CRITICAL CARE ADDL 30 MIN: CPT | Mod: GC,25

## 2024-01-01 PROCEDURE — 31622 DX BRONCHOSCOPE/WASH: CPT | Mod: GC,59

## 2024-01-01 PROCEDURE — 31500 INSERT EMERGENCY AIRWAY: CPT | Mod: GC,59

## 2024-01-01 PROCEDURE — 76705 ECHO EXAM OF ABDOMEN: CPT | Mod: 26,GC

## 2024-01-01 PROCEDURE — 95718 EEG PHYS/QHP 2-12 HR W/VEEG: CPT

## 2024-01-01 PROCEDURE — 74019 RADEX ABDOMEN 2 VIEWS: CPT | Mod: 26

## 2024-01-01 PROCEDURE — 36569 INSJ PICC 5 YR+ W/O IMAGING: CPT | Mod: GC,59

## 2024-01-01 PROCEDURE — 76705 ECHO EXAM OF ABDOMEN: CPT | Mod: 26,59

## 2024-01-01 PROCEDURE — 99222 1ST HOSP IP/OBS MODERATE 55: CPT

## 2024-01-01 PROCEDURE — 90945 DIALYSIS ONE EVALUATION: CPT

## 2024-01-01 PROCEDURE — 93306 TTE W/DOPPLER COMPLETE: CPT | Mod: 26

## 2024-01-01 PROCEDURE — 99232 SBSQ HOSP IP/OBS MODERATE 35: CPT | Mod: GC

## 2024-01-01 PROCEDURE — 36620 INSERTION CATHETER ARTERY: CPT | Mod: GC,59

## 2024-01-01 PROCEDURE — 99222 1ST HOSP IP/OBS MODERATE 55: CPT | Mod: GC

## 2024-01-01 PROCEDURE — 99291 CRITICAL CARE FIRST HOUR: CPT

## 2024-01-01 PROCEDURE — 99291 CRITICAL CARE FIRST HOUR: CPT | Mod: 25

## 2024-01-01 PROCEDURE — 44360 SMALL BOWEL ENDOSCOPY: CPT | Mod: GC

## 2024-01-01 RX ORDER — POTASSIUM CHLORIDE 20 MEQ
40 PACKET (EA) ORAL ONCE
Refills: 0 | Status: DISCONTINUED | OUTPATIENT
Start: 2024-01-01 | End: 2024-01-01

## 2024-01-01 RX ORDER — PROPOFOL 10 MG/ML
49.95 INJECTION, EMULSION INTRAVENOUS
Qty: 1000 | Refills: 0 | Status: DISCONTINUED | OUTPATIENT
Start: 2024-01-01 | End: 2024-01-01

## 2024-01-01 RX ORDER — SODIUM,POTASSIUM PHOSPHATES 278-250MG
1 POWDER IN PACKET (EA) ORAL ONCE
Refills: 0 | Status: COMPLETED | OUTPATIENT
Start: 2024-01-01 | End: 2024-01-01

## 2024-01-01 RX ORDER — POTASSIUM CHLORIDE 20 MEQ
20 PACKET (EA) ORAL
Refills: 0 | Status: COMPLETED | OUTPATIENT
Start: 2024-01-01 | End: 2024-01-01

## 2024-01-01 RX ORDER — PHYTONADIONE (VIT K1) 5 MG
10 TABLET ORAL ONCE
Refills: 0 | Status: COMPLETED | OUTPATIENT
Start: 2024-01-01 | End: 2024-01-01

## 2024-01-01 RX ORDER — PIPERACILLIN AND TAZOBACTAM 4; .5 G/20ML; G/20ML
3.38 INJECTION, POWDER, LYOPHILIZED, FOR SOLUTION INTRAVENOUS EVERY 12 HOURS
Refills: 0 | Status: DISCONTINUED | OUTPATIENT
Start: 2024-01-01 | End: 2024-01-01

## 2024-01-01 RX ORDER — POTASSIUM CHLORIDE 20 MEQ
40 PACKET (EA) ORAL ONCE
Refills: 0 | Status: COMPLETED | OUTPATIENT
Start: 2024-01-01 | End: 2024-01-01

## 2024-01-01 RX ORDER — ALBUMIN HUMAN 25 %
250 VIAL (ML) INTRAVENOUS EVERY 6 HOURS
Refills: 0 | Status: COMPLETED | OUTPATIENT
Start: 2024-01-01 | End: 2024-01-01

## 2024-01-01 RX ORDER — PHYTONADIONE (VIT K1) 5 MG
5 TABLET ORAL ONCE
Refills: 0 | Status: COMPLETED | OUTPATIENT
Start: 2024-01-01 | End: 2024-01-01

## 2024-01-01 RX ORDER — HYDROCORTISONE 20 MG
50 TABLET ORAL EVERY 6 HOURS
Refills: 0 | Status: DISCONTINUED | OUTPATIENT
Start: 2024-01-01 | End: 2024-01-01

## 2024-01-01 RX ORDER — FLUCONAZOLE 150 MG/1
400 TABLET ORAL EVERY 24 HOURS
Refills: 0 | Status: DISCONTINUED | OUTPATIENT
Start: 2024-01-01 | End: 2024-01-01

## 2024-01-01 RX ORDER — ACETYLCYSTEINE 200 MG/ML
9 VIAL (ML) MISCELLANEOUS ONCE
Refills: 0 | Status: COMPLETED | OUTPATIENT
Start: 2024-01-01 | End: 2024-01-01

## 2024-01-01 RX ORDER — MIDAZOLAM HYDROCHLORIDE 1 MG/ML
2 INJECTION, SOLUTION INTRAMUSCULAR; INTRAVENOUS ONCE
Refills: 0 | Status: DISCONTINUED | OUTPATIENT
Start: 2024-01-01 | End: 2024-01-01

## 2024-01-01 RX ORDER — PHYTONADIONE (VIT K1) 5 MG
10 TABLET ORAL ONCE
Refills: 0 | Status: DISCONTINUED | OUTPATIENT
Start: 2024-01-01 | End: 2024-01-01

## 2024-01-01 RX ORDER — HYDROCORTISONE 20 MG
100 TABLET ORAL EVERY 8 HOURS
Refills: 0 | Status: DISCONTINUED | OUTPATIENT
Start: 2024-01-01 | End: 2024-01-01

## 2024-01-01 RX ORDER — CHLORHEXIDINE GLUCONATE 213 G/1000ML
15 SOLUTION TOPICAL EVERY 12 HOURS
Refills: 0 | Status: DISCONTINUED | OUTPATIENT
Start: 2024-01-01 | End: 2024-01-01

## 2024-01-01 RX ORDER — MULTIVIT-MIN/FERROUS GLUCONATE 9 MG/15 ML
15 LIQUID (ML) ORAL DAILY
Refills: 0 | Status: DISCONTINUED | OUTPATIENT
Start: 2024-01-01 | End: 2024-01-01

## 2024-01-01 RX ORDER — ACETYLCYSTEINE 200 MG/ML
14 VIAL (ML) MISCELLANEOUS ONCE
Refills: 0 | Status: COMPLETED | OUTPATIENT
Start: 2024-01-01 | End: 2024-01-01

## 2024-01-01 RX ORDER — MAGNESIUM SULFATE 500 MG/ML
2 VIAL (ML) INJECTION ONCE
Refills: 0 | Status: COMPLETED | OUTPATIENT
Start: 2024-01-01 | End: 2024-01-01

## 2024-01-01 RX ORDER — CALCIUM GLUCONATE 100 MG/ML
2 VIAL (ML) INTRAVENOUS ONCE
Refills: 0 | Status: COMPLETED | OUTPATIENT
Start: 2024-01-01 | End: 2024-01-01

## 2024-01-01 RX ORDER — AZITHROMYCIN 500 MG/1
500 TABLET, FILM COATED ORAL EVERY 24 HOURS
Refills: 0 | Status: DISCONTINUED | OUTPATIENT
Start: 2024-01-01 | End: 2024-01-01

## 2024-01-01 RX ORDER — DEXTROSE 50 % IN WATER 50 %
50 SYRINGE (ML) INTRAVENOUS ONCE
Refills: 0 | Status: COMPLETED | OUTPATIENT
Start: 2024-01-01 | End: 2024-01-01

## 2024-01-01 RX ORDER — SODIUM CHLORIDE 9 MG/ML
1000 INJECTION, SOLUTION INTRAVENOUS
Refills: 0 | Status: DISCONTINUED | OUTPATIENT
Start: 2024-01-01 | End: 2024-01-01

## 2024-01-01 RX ORDER — METOCLOPRAMIDE HCL 10 MG
10 TABLET ORAL ONCE
Refills: 0 | Status: COMPLETED | OUTPATIENT
Start: 2024-01-01 | End: 2024-01-01

## 2024-01-01 RX ORDER — POTASSIUM PHOSPHATE, MONOBASIC POTASSIUM PHOSPHATE, DIBASIC 236; 224 MG/ML; MG/ML
30 INJECTION, SOLUTION INTRAVENOUS ONCE
Refills: 0 | Status: COMPLETED | OUTPATIENT
Start: 2024-01-01 | End: 2024-01-01

## 2024-01-01 RX ORDER — KETAMINE HYDROCHLORIDE 100 MG/ML
1.5 INJECTION INTRAMUSCULAR; INTRAVENOUS
Qty: 1000 | Refills: 0 | Status: DISCONTINUED | OUTPATIENT
Start: 2024-01-01 | End: 2024-01-01

## 2024-01-01 RX ORDER — FUROSEMIDE 40 MG
80 TABLET ORAL ONCE
Refills: 0 | Status: DISCONTINUED | OUTPATIENT
Start: 2024-01-01 | End: 2024-01-01

## 2024-01-01 RX ORDER — VANCOMYCIN HCL 1 G
1000 VIAL (EA) INTRAVENOUS ONCE
Refills: 0 | Status: DISCONTINUED | OUTPATIENT
Start: 2024-01-01 | End: 2024-01-01

## 2024-01-01 RX ORDER — PROPOFOL 10 MG/ML
50 INJECTION, EMULSION INTRAVENOUS
Qty: 1000 | Refills: 0 | Status: DISCONTINUED | OUTPATIENT
Start: 2024-01-01 | End: 2024-01-01

## 2024-01-01 RX ORDER — POLYETHYLENE GLYCOL 3350 17 G/17G
17 POWDER, FOR SOLUTION ORAL
Refills: 0 | Status: DISCONTINUED | OUTPATIENT
Start: 2024-01-01 | End: 2024-01-01

## 2024-01-01 RX ORDER — PIPERACILLIN AND TAZOBACTAM 4; .5 G/20ML; G/20ML
3.38 INJECTION, POWDER, LYOPHILIZED, FOR SOLUTION INTRAVENOUS ONCE
Refills: 0 | Status: DISCONTINUED | OUTPATIENT
Start: 2024-01-01 | End: 2024-01-01

## 2024-01-01 RX ORDER — IPRATROPIUM/ALBUTEROL SULFATE 18-103MCG
3 AEROSOL WITH ADAPTER (GRAM) INHALATION EVERY 6 HOURS
Refills: 0 | Status: DISCONTINUED | OUTPATIENT
Start: 2024-01-01 | End: 2024-01-01

## 2024-01-01 RX ORDER — CEFEPIME 1 G/1
2000 INJECTION, POWDER, FOR SOLUTION INTRAMUSCULAR; INTRAVENOUS EVERY 8 HOURS
Refills: 0 | Status: DISCONTINUED | OUTPATIENT
Start: 2024-01-01 | End: 2024-01-01

## 2024-01-01 RX ORDER — METOCLOPRAMIDE HCL 10 MG
5 TABLET ORAL EVERY 8 HOURS
Refills: 0 | Status: DISCONTINUED | OUTPATIENT
Start: 2024-01-01 | End: 2024-01-01

## 2024-01-01 RX ORDER — FENTANYL CITRATE 50 UG/ML
3 INJECTION INTRAVENOUS
Qty: 2500 | Refills: 0 | Status: DISCONTINUED | OUTPATIENT
Start: 2024-01-01 | End: 2024-01-01

## 2024-01-01 RX ORDER — PIPERACILLIN AND TAZOBACTAM 4; .5 G/20ML; G/20ML
3.38 INJECTION, POWDER, LYOPHILIZED, FOR SOLUTION INTRAVENOUS ONCE
Refills: 0 | Status: COMPLETED | OUTPATIENT
Start: 2024-01-01 | End: 2024-01-01

## 2024-01-01 RX ORDER — ROBINUL 0.2 MG/ML
0.4 INJECTION INTRAMUSCULAR; INTRAVENOUS
Refills: 0 | Status: DISCONTINUED | OUTPATIENT
Start: 2024-01-01 | End: 2024-01-01

## 2024-01-01 RX ORDER — VASOPRESSIN 20 [USP'U]/ML
0.04 INJECTION INTRAVENOUS
Qty: 40 | Refills: 0 | Status: DISCONTINUED | OUTPATIENT
Start: 2024-01-01 | End: 2024-01-01

## 2024-01-01 RX ORDER — CISATRACURIUM BESYLATE 2 MG/ML
2 INJECTION INTRAVENOUS
Qty: 200 | Refills: 0 | Status: DISCONTINUED | OUTPATIENT
Start: 2024-01-01 | End: 2024-01-01

## 2024-01-01 RX ORDER — PANTOPRAZOLE SODIUM 20 MG/1
8 TABLET, DELAYED RELEASE ORAL
Qty: 80 | Refills: 0 | Status: DISCONTINUED | OUTPATIENT
Start: 2024-01-01 | End: 2024-01-01

## 2024-01-01 RX ORDER — ACETAMINOPHEN 500 MG
650 TABLET ORAL ONCE
Refills: 0 | Status: COMPLETED | OUTPATIENT
Start: 2024-01-01 | End: 2024-01-01

## 2024-01-01 RX ORDER — OCTREOTIDE ACETATE 200 UG/ML
50 INJECTION, SOLUTION INTRAVENOUS; SUBCUTANEOUS
Qty: 500 | Refills: 0 | Status: DISCONTINUED | OUTPATIENT
Start: 2024-01-01 | End: 2024-01-01

## 2024-01-01 RX ORDER — ALBUMIN HUMAN 25 %
100 VIAL (ML) INTRAVENOUS EVERY 6 HOURS
Refills: 0 | Status: COMPLETED | OUTPATIENT
Start: 2024-01-01 | End: 2024-01-01

## 2024-01-01 RX ORDER — NOREPINEPHRINE BITARTRATE/D5W 8 MG/250ML
0.05 PLASTIC BAG, INJECTION (ML) INTRAVENOUS
Qty: 16 | Refills: 0 | Status: DISCONTINUED | OUTPATIENT
Start: 2024-01-01 | End: 2024-01-01

## 2024-01-01 RX ORDER — SENNA PLUS 8.6 MG/1
2 TABLET ORAL AT BEDTIME
Refills: 0 | Status: DISCONTINUED | OUTPATIENT
Start: 2024-01-01 | End: 2024-01-01

## 2024-01-01 RX ORDER — BUMETANIDE 0.25 MG/ML
2 INJECTION INTRAMUSCULAR; INTRAVENOUS ONCE
Refills: 0 | Status: COMPLETED | OUTPATIENT
Start: 2024-01-01 | End: 2024-01-01

## 2024-01-01 RX ORDER — METHYLNALTREXONE BROMIDE 12 MG/.6ML
12 INJECTION, SOLUTION SUBCUTANEOUS ONCE
Refills: 0 | Status: COMPLETED | OUTPATIENT
Start: 2024-01-01 | End: 2024-01-01

## 2024-01-01 RX ORDER — MUPIROCIN 20 MG/G
1 OINTMENT TOPICAL EVERY 12 HOURS
Refills: 0 | Status: DISCONTINUED | OUTPATIENT
Start: 2024-01-01 | End: 2024-01-01

## 2024-01-01 RX ORDER — HYDROMORPHONE HYDROCHLORIDE 2 MG/ML
2 INJECTION INTRAMUSCULAR; INTRAVENOUS; SUBCUTANEOUS ONCE
Refills: 0 | Status: DISCONTINUED | OUTPATIENT
Start: 2024-01-01 | End: 2024-01-01

## 2024-01-01 RX ORDER — HEPARIN SODIUM 5000 [USP'U]/ML
5000 INJECTION INTRAVENOUS; SUBCUTANEOUS EVERY 12 HOURS
Refills: 0 | Status: DISCONTINUED | OUTPATIENT
Start: 2024-01-01 | End: 2024-01-01

## 2024-01-01 RX ORDER — PIPERACILLIN AND TAZOBACTAM 4; .5 G/20ML; G/20ML
3.38 INJECTION, POWDER, LYOPHILIZED, FOR SOLUTION INTRAVENOUS EVERY 8 HOURS
Refills: 0 | Status: DISCONTINUED | OUTPATIENT
Start: 2024-01-01 | End: 2024-01-01

## 2024-01-01 RX ORDER — ACETAMINOPHEN 500 MG
1000 TABLET ORAL ONCE
Refills: 0 | Status: COMPLETED | OUTPATIENT
Start: 2024-01-01 | End: 2024-01-01

## 2024-01-01 RX ORDER — POTASSIUM CHLORIDE 20 MEQ
10 PACKET (EA) ORAL
Refills: 0 | Status: COMPLETED | OUTPATIENT
Start: 2024-01-01 | End: 2024-01-01

## 2024-01-01 RX ORDER — CISATRACURIUM BESYLATE 2 MG/ML
20 INJECTION INTRAVENOUS ONCE
Refills: 0 | Status: COMPLETED | OUTPATIENT
Start: 2024-01-01 | End: 2024-01-01

## 2024-01-01 RX ORDER — PROPOFOL 10 MG/ML
49.95 INJECTION, EMULSION INTRAVENOUS
Qty: 500 | Refills: 0 | Status: DISCONTINUED | OUTPATIENT
Start: 2024-01-01 | End: 2024-01-01

## 2024-01-01 RX ORDER — ACETYLCYSTEINE 200 MG/ML
9 VIAL (ML) MISCELLANEOUS ONCE
Refills: 0 | Status: DISCONTINUED | OUTPATIENT
Start: 2024-01-01 | End: 2024-01-01

## 2024-01-01 RX ORDER — ACETAMINOPHEN 500 MG
500 TABLET ORAL ONCE
Refills: 0 | Status: COMPLETED | OUTPATIENT
Start: 2024-01-01 | End: 2024-01-01

## 2024-01-01 RX ORDER — VANCOMYCIN HCL 1 G
750 VIAL (EA) INTRAVENOUS EVERY 12 HOURS
Refills: 0 | Status: DISCONTINUED | OUTPATIENT
Start: 2024-01-01 | End: 2024-01-01

## 2024-01-01 RX ORDER — FLUCONAZOLE 150 MG/1
400 TABLET ORAL
Refills: 0 | Status: DISCONTINUED | OUTPATIENT
Start: 2024-01-01 | End: 2024-01-01

## 2024-01-01 RX ORDER — FOLIC ACID 0.8 MG
1 TABLET ORAL DAILY
Refills: 0 | Status: DISCONTINUED | OUTPATIENT
Start: 2024-01-01 | End: 2024-01-01

## 2024-01-01 RX ORDER — DEXTROSE 10 % IN WATER 10 %
1000 INTRAVENOUS SOLUTION INTRAVENOUS
Refills: 0 | Status: DISCONTINUED | OUTPATIENT
Start: 2024-01-01 | End: 2024-01-01

## 2024-01-01 RX ORDER — FENTANYL CITRATE 50 UG/ML
100 INJECTION INTRAVENOUS ONCE
Refills: 0 | Status: DISCONTINUED | OUTPATIENT
Start: 2024-01-01 | End: 2024-01-01

## 2024-01-01 RX ORDER — AZITHROMYCIN 500 MG/1
TABLET, FILM COATED ORAL
Refills: 0 | Status: DISCONTINUED | OUTPATIENT
Start: 2024-01-01 | End: 2024-01-01

## 2024-01-01 RX ORDER — KETAMINE HYDROCHLORIDE 100 MG/ML
0.5 INJECTION INTRAMUSCULAR; INTRAVENOUS
Qty: 1000 | Refills: 0 | Status: DISCONTINUED | OUTPATIENT
Start: 2024-01-01 | End: 2024-01-01

## 2024-01-01 RX ORDER — VANCOMYCIN HCL 1 G
2000 VIAL (EA) INTRAVENOUS ONCE
Refills: 0 | Status: COMPLETED | OUTPATIENT
Start: 2024-01-01 | End: 2024-01-01

## 2024-01-01 RX ORDER — POTASSIUM CHLORIDE 20 MEQ
10 PACKET (EA) ORAL
Refills: 0 | Status: DISCONTINUED | OUTPATIENT
Start: 2024-01-01 | End: 2024-01-01

## 2024-01-01 RX ORDER — FUROSEMIDE 40 MG
40 TABLET ORAL ONCE
Refills: 0 | Status: COMPLETED | OUTPATIENT
Start: 2024-01-01 | End: 2024-01-01

## 2024-01-01 RX ORDER — DEXMEDETOMIDINE HYDROCHLORIDE IN 0.9% SODIUM CHLORIDE 4 UG/ML
0.3 INJECTION INTRAVENOUS
Qty: 400 | Refills: 0 | Status: DISCONTINUED | OUTPATIENT
Start: 2024-01-01 | End: 2024-01-01

## 2024-01-01 RX ORDER — PANTOPRAZOLE SODIUM 20 MG/1
40 TABLET, DELAYED RELEASE ORAL
Refills: 0 | Status: DISCONTINUED | OUTPATIENT
Start: 2024-01-01 | End: 2024-01-01

## 2024-01-01 RX ORDER — THIAMINE MONONITRATE (VIT B1) 100 MG
100 TABLET ORAL DAILY
Refills: 0 | Status: DISCONTINUED | OUTPATIENT
Start: 2024-01-01 | End: 2024-01-01

## 2024-01-01 RX ORDER — HYDROCORTISONE 20 MG
50 TABLET ORAL EVERY 12 HOURS
Refills: 0 | Status: DISCONTINUED | OUTPATIENT
Start: 2024-01-01 | End: 2024-01-01

## 2024-01-01 RX ORDER — METOCLOPRAMIDE HCL 10 MG
5 TABLET ORAL EVERY 8 HOURS
Refills: 0 | Status: COMPLETED | OUTPATIENT
Start: 2024-01-01 | End: 2024-01-01

## 2024-01-01 RX ORDER — TRANEXAMIC ACID 100 MG/ML
500 INJECTION, SOLUTION INTRAVENOUS EVERY 8 HOURS
Refills: 0 | Status: DISCONTINUED | OUTPATIENT
Start: 2024-01-01 | End: 2024-01-01

## 2024-01-01 RX ORDER — CISATRACURIUM BESYLATE 2 MG/ML
1 INJECTION INTRAVENOUS
Qty: 200 | Refills: 0 | Status: DISCONTINUED | OUTPATIENT
Start: 2024-01-01 | End: 2024-01-01

## 2024-01-01 RX ORDER — PIPERACILLIN AND TAZOBACTAM 4; .5 G/20ML; G/20ML
4.5 INJECTION, POWDER, LYOPHILIZED, FOR SOLUTION INTRAVENOUS EVERY 8 HOURS
Refills: 0 | Status: DISCONTINUED | OUTPATIENT
Start: 2024-01-01 | End: 2024-01-01

## 2024-01-01 RX ORDER — VANCOMYCIN HCL 1 G
1250 VIAL (EA) INTRAVENOUS ONCE
Refills: 0 | Status: COMPLETED | OUTPATIENT
Start: 2024-01-01 | End: 2024-01-01

## 2024-01-01 RX ORDER — NOREPINEPHRINE BITARTRATE/D5W 8 MG/250ML
0.14 PLASTIC BAG, INJECTION (ML) INTRAVENOUS
Qty: 16 | Refills: 0 | Status: DISCONTINUED | OUTPATIENT
Start: 2024-01-01 | End: 2024-01-01

## 2024-01-01 RX ORDER — SENNA PLUS 8.6 MG/1
15 TABLET ORAL AT BEDTIME
Refills: 0 | Status: DISCONTINUED | OUTPATIENT
Start: 2024-01-01 | End: 2024-01-01

## 2024-01-01 RX ORDER — IPRATROPIUM/ALBUTEROL SULFATE 18-103MCG
3 AEROSOL WITH ADAPTER (GRAM) INHALATION ONCE
Refills: 0 | Status: DISCONTINUED | OUTPATIENT
Start: 2024-01-01 | End: 2024-01-01

## 2024-01-01 RX ORDER — CHLORHEXIDINE GLUCONATE 213 G/1000ML
1 SOLUTION TOPICAL DAILY
Refills: 0 | Status: DISCONTINUED | OUTPATIENT
Start: 2024-01-01 | End: 2024-01-01

## 2024-01-01 RX ORDER — BUMETANIDE 0.25 MG/ML
2.5 INJECTION INTRAMUSCULAR; INTRAVENOUS
Qty: 20 | Refills: 0 | Status: DISCONTINUED | OUTPATIENT
Start: 2024-01-01 | End: 2024-01-01

## 2024-01-01 RX ORDER — HYDROMORPHONE HYDROCHLORIDE 2 MG/ML
0.5 INJECTION INTRAMUSCULAR; INTRAVENOUS; SUBCUTANEOUS
Refills: 0 | Status: DISCONTINUED | OUTPATIENT
Start: 2024-01-01 | End: 2024-01-01

## 2024-01-01 RX ORDER — ACETYLCYSTEINE 200 MG/ML
4.6 VIAL (ML) MISCELLANEOUS ONCE
Refills: 0 | Status: COMPLETED | OUTPATIENT
Start: 2024-01-01 | End: 2024-01-01

## 2024-01-01 RX ORDER — HYDROCORTISONE 20 MG
50 TABLET ORAL EVERY 8 HOURS
Refills: 0 | Status: DISCONTINUED | OUTPATIENT
Start: 2024-01-01 | End: 2024-01-01

## 2024-01-01 RX ORDER — FLUCONAZOLE 150 MG/1
800 TABLET ORAL ONCE
Refills: 0 | Status: COMPLETED | OUTPATIENT
Start: 2024-01-01 | End: 2024-01-01

## 2024-01-01 RX ORDER — LACTULOSE 10 G/15ML
30 SOLUTION ORAL THREE TIMES A DAY
Refills: 0 | Status: DISCONTINUED | OUTPATIENT
Start: 2024-01-01 | End: 2024-01-01

## 2024-01-01 RX ORDER — PHYTONADIONE (VIT K1) 5 MG
10 TABLET ORAL DAILY
Refills: 0 | Status: COMPLETED | OUTPATIENT
Start: 2024-01-01 | End: 2024-01-01

## 2024-01-01 RX ORDER — METRONIDAZOLE 500 MG
500 TABLET ORAL EVERY 12 HOURS
Refills: 0 | Status: DISCONTINUED | OUTPATIENT
Start: 2024-01-01 | End: 2024-01-01

## 2024-01-01 RX ORDER — MUPIROCIN 20 MG/G
1 OINTMENT TOPICAL EVERY 12 HOURS
Refills: 0 | Status: COMPLETED | OUTPATIENT
Start: 2024-01-01 | End: 2024-01-01

## 2024-01-01 RX ORDER — AZITHROMYCIN 500 MG/1
500 TABLET, FILM COATED ORAL ONCE
Refills: 0 | Status: COMPLETED | OUTPATIENT
Start: 2024-01-01 | End: 2024-01-01

## 2024-01-01 RX ORDER — POTASSIUM CHLORIDE 20 MEQ
40 PACKET (EA) ORAL EVERY 4 HOURS
Refills: 0 | Status: DISCONTINUED | OUTPATIENT
Start: 2024-01-01 | End: 2024-01-01

## 2024-01-01 RX ORDER — ALPRAZOLAM 0.25 MG
0.25 TABLET ORAL ONCE
Refills: 0 | Status: DISCONTINUED | OUTPATIENT
Start: 2024-01-01 | End: 2024-01-01

## 2024-01-01 RX ADMIN — Medication 1 APPLICATION(S): at 06:00

## 2024-01-01 RX ADMIN — Medication 1 MILLIGRAM(S): at 11:11

## 2024-01-01 RX ADMIN — Medication 3 MILLILITER(S): at 21:49

## 2024-01-01 RX ADMIN — CISATRACURIUM BESYLATE 11.1 MICROGRAM(S)/KG/MIN: 2 INJECTION INTRAVENOUS at 19:20

## 2024-01-01 RX ADMIN — CHLORHEXIDINE GLUCONATE 1 APPLICATION(S): 213 SOLUTION TOPICAL at 11:49

## 2024-01-01 RX ADMIN — PROPOFOL 27.6 MICROGRAM(S)/KG/MIN: 10 INJECTION, EMULSION INTRAVENOUS at 17:27

## 2024-01-01 RX ADMIN — POTASSIUM PHOSPHATE, MONOBASIC POTASSIUM PHOSPHATE, DIBASIC 83.33 MILLIMOLE(S): 236; 224 INJECTION, SOLUTION INTRAVENOUS at 02:01

## 2024-01-01 RX ADMIN — Medication 3 MILLILITER(S): at 21:41

## 2024-01-01 RX ADMIN — Medication 3 MILLILITER(S): at 09:03

## 2024-01-01 RX ADMIN — DEXMEDETOMIDINE HYDROCHLORIDE IN 0.9% SODIUM CHLORIDE 6.91 MICROGRAM(S)/KG/HR: 4 INJECTION INTRAVENOUS at 19:12

## 2024-01-01 RX ADMIN — FENTANYL CITRATE 27.6 MICROGRAM(S)/KG/HR: 50 INJECTION INTRAVENOUS at 07:10

## 2024-01-01 RX ADMIN — Medication 3 MILLILITER(S): at 15:30

## 2024-01-01 RX ADMIN — Medication 100 MILLIGRAM(S): at 21:41

## 2024-01-01 RX ADMIN — CEFEPIME 100 MILLIGRAM(S): 1 INJECTION, POWDER, FOR SOLUTION INTRAMUSCULAR; INTRAVENOUS at 10:33

## 2024-01-01 RX ADMIN — MUPIROCIN 1 APPLICATION(S): 20 OINTMENT TOPICAL at 05:17

## 2024-01-01 RX ADMIN — DEXMEDETOMIDINE HYDROCHLORIDE IN 0.9% SODIUM CHLORIDE 6.91 MICROGRAM(S)/KG/HR: 4 INJECTION INTRAVENOUS at 19:37

## 2024-01-01 RX ADMIN — PIPERACILLIN AND TAZOBACTAM 200 GRAM(S): 4; .5 INJECTION, POWDER, LYOPHILIZED, FOR SOLUTION INTRAVENOUS at 02:59

## 2024-01-01 RX ADMIN — MUPIROCIN 1 APPLICATION(S): 20 OINTMENT TOPICAL at 17:43

## 2024-01-01 RX ADMIN — Medication 2 MILLIGRAM(S): at 13:00

## 2024-01-01 RX ADMIN — Medication 1 MILLIGRAM(S): at 11:16

## 2024-01-01 RX ADMIN — Medication 100 MILLIGRAM(S): at 11:58

## 2024-01-01 RX ADMIN — SENNA PLUS 15 MILLILITER(S): 8.6 TABLET ORAL at 21:04

## 2024-01-01 RX ADMIN — TRANEXAMIC ACID 500 MILLIGRAM(S): 100 INJECTION, SOLUTION INTRAVENOUS at 21:43

## 2024-01-01 RX ADMIN — Medication 25 GRAM(S): at 09:17

## 2024-01-01 RX ADMIN — Medication 3 MILLILITER(S): at 03:16

## 2024-01-01 RX ADMIN — Medication 1 APPLICATION(S): at 05:21

## 2024-01-01 RX ADMIN — Medication 100 MILLIGRAM(S): at 13:20

## 2024-01-01 RX ADMIN — FENTANYL CITRATE 27.6 MICROGRAM(S)/KG/HR: 50 INJECTION INTRAVENOUS at 02:00

## 2024-01-01 RX ADMIN — LACTULOSE 30 GRAM(S): 10 SOLUTION ORAL at 07:48

## 2024-01-01 RX ADMIN — Medication 4.32 MICROGRAM(S)/KG/MIN: at 12:15

## 2024-01-01 RX ADMIN — CEFEPIME 100 MILLIGRAM(S): 1 INJECTION, POWDER, FOR SOLUTION INTRAMUSCULAR; INTRAVENOUS at 03:04

## 2024-01-01 RX ADMIN — Medication 3 MILLILITER(S): at 14:23

## 2024-01-01 RX ADMIN — VASOPRESSIN 6 UNIT(S)/MIN: 20 INJECTION INTRAVENOUS at 07:39

## 2024-01-01 RX ADMIN — PIPERACILLIN AND TAZOBACTAM 25 GRAM(S): 4; .5 INJECTION, POWDER, LYOPHILIZED, FOR SOLUTION INTRAVENOUS at 21:05

## 2024-01-01 RX ADMIN — Medication 50 MILLIGRAM(S): at 18:05

## 2024-01-01 RX ADMIN — Medication 1 APPLICATION(S): at 17:19

## 2024-01-01 RX ADMIN — FLUCONAZOLE 100 MILLIGRAM(S): 150 TABLET ORAL at 11:54

## 2024-01-01 RX ADMIN — Medication 4.32 MICROGRAM(S)/KG/MIN: at 07:17

## 2024-01-01 RX ADMIN — CHLORHEXIDINE GLUCONATE 15 MILLILITER(S): 213 SOLUTION TOPICAL at 17:07

## 2024-01-01 RX ADMIN — Medication 15 MILLILITER(S): at 11:20

## 2024-01-01 RX ADMIN — Medication 1 APPLICATION(S): at 05:30

## 2024-01-01 RX ADMIN — BUMETANIDE 7.5 MG/HR: 0.25 INJECTION INTRAMUSCULAR; INTRAVENOUS at 17:43

## 2024-01-01 RX ADMIN — Medication 100 MILLIGRAM(S): at 11:20

## 2024-01-01 RX ADMIN — PANTOPRAZOLE SODIUM 40 MILLIGRAM(S): 20 TABLET, DELAYED RELEASE ORAL at 05:13

## 2024-01-01 RX ADMIN — Medication 1 MILLIGRAM(S): at 12:01

## 2024-01-01 RX ADMIN — PIPERACILLIN AND TAZOBACTAM 25 GRAM(S): 4; .5 INJECTION, POWDER, LYOPHILIZED, FOR SOLUTION INTRAVENOUS at 18:53

## 2024-01-01 RX ADMIN — PROPOFOL 27.6 MICROGRAM(S)/KG/MIN: 10 INJECTION, EMULSION INTRAVENOUS at 14:37

## 2024-01-01 RX ADMIN — Medication 3 MILLILITER(S): at 22:11

## 2024-01-01 RX ADMIN — VASOPRESSIN 6 UNIT(S)/MIN: 20 INJECTION INTRAVENOUS at 19:38

## 2024-01-01 RX ADMIN — CEFEPIME 100 MILLIGRAM(S): 1 INJECTION, POWDER, FOR SOLUTION INTRAMUSCULAR; INTRAVENOUS at 18:47

## 2024-01-01 RX ADMIN — Medication 1 MILLIGRAM(S): at 11:12

## 2024-01-01 RX ADMIN — CHLORHEXIDINE GLUCONATE 1 APPLICATION(S): 213 SOLUTION TOPICAL at 11:11

## 2024-01-01 RX ADMIN — Medication 3 MILLILITER(S): at 21:37

## 2024-01-01 RX ADMIN — KETAMINE HYDROCHLORIDE 4.61 MG/KG/HR: 100 INJECTION INTRAMUSCULAR; INTRAVENOUS at 07:39

## 2024-01-01 RX ADMIN — Medication 50 MILLILITER(S): at 08:57

## 2024-01-01 RX ADMIN — Medication 5 MILLIGRAM(S): at 13:27

## 2024-01-01 RX ADMIN — Medication 4.32 MICROGRAM(S)/KG/MIN: at 16:46

## 2024-01-01 RX ADMIN — Medication 50 MILLIEQUIVALENT(S): at 02:02

## 2024-01-01 RX ADMIN — Medication 1 APPLICATION(S): at 05:45

## 2024-01-01 RX ADMIN — Medication 50 MILLIEQUIVALENT(S): at 01:02

## 2024-01-01 RX ADMIN — Medication 4.32 MICROGRAM(S)/KG/MIN: at 07:32

## 2024-01-01 RX ADMIN — SENNA PLUS 2 TABLET(S): 8.6 TABLET ORAL at 21:41

## 2024-01-01 RX ADMIN — Medication 1 APPLICATION(S): at 17:18

## 2024-01-01 RX ADMIN — Medication 50 MILLIEQUIVALENT(S): at 06:06

## 2024-01-01 RX ADMIN — Medication 15 MILLILITER(S): at 16:56

## 2024-01-01 RX ADMIN — VASOPRESSIN 6 UNIT(S)/MIN: 20 INJECTION INTRAVENOUS at 19:36

## 2024-01-01 RX ADMIN — Medication 1 APPLICATION(S): at 17:43

## 2024-01-01 RX ADMIN — KETAMINE HYDROCHLORIDE 13.8 MG/KG/HR: 100 INJECTION INTRAMUSCULAR; INTRAVENOUS at 09:37

## 2024-01-01 RX ADMIN — Medication 1 APPLICATION(S): at 17:15

## 2024-01-01 RX ADMIN — Medication 500 MILLIGRAM(S): at 09:36

## 2024-01-01 RX ADMIN — Medication 250 MILLIGRAM(S): at 11:11

## 2024-01-01 RX ADMIN — Medication 250 MILLIGRAM(S): at 11:00

## 2024-01-01 RX ADMIN — Medication 15 MILLILITER(S): at 11:12

## 2024-01-01 RX ADMIN — KETAMINE HYDROCHLORIDE 13.8 MG/KG/HR: 100 INJECTION INTRAMUSCULAR; INTRAVENOUS at 07:49

## 2024-01-01 RX ADMIN — KETAMINE HYDROCHLORIDE 4.61 MG/KG/HR: 100 INJECTION INTRAMUSCULAR; INTRAVENOUS at 20:00

## 2024-01-01 RX ADMIN — CHLORHEXIDINE GLUCONATE 15 MILLILITER(S): 213 SOLUTION TOPICAL at 05:39

## 2024-01-01 RX ADMIN — KETAMINE HYDROCHLORIDE 13.8 MG/KG/HR: 100 INJECTION INTRAMUSCULAR; INTRAVENOUS at 07:06

## 2024-01-01 RX ADMIN — KETAMINE HYDROCHLORIDE 4.61 MG/KG/HR: 100 INJECTION INTRAMUSCULAR; INTRAVENOUS at 09:15

## 2024-01-01 RX ADMIN — CHLORHEXIDINE GLUCONATE 15 MILLILITER(S): 213 SOLUTION TOPICAL at 17:19

## 2024-01-01 RX ADMIN — Medication 4.32 MICROGRAM(S)/KG/MIN: at 19:32

## 2024-01-01 RX ADMIN — Medication 100 MILLIGRAM(S): at 13:48

## 2024-01-01 RX ADMIN — Medication 3 MILLILITER(S): at 15:00

## 2024-01-01 RX ADMIN — Medication 102 MILLIGRAM(S): at 10:57

## 2024-01-01 RX ADMIN — CHLORHEXIDINE GLUCONATE 15 MILLILITER(S): 213 SOLUTION TOPICAL at 05:04

## 2024-01-01 RX ADMIN — VASOPRESSIN 6 UNIT(S)/MIN: 20 INJECTION INTRAVENOUS at 11:59

## 2024-01-01 RX ADMIN — Medication 1 APPLICATION(S): at 05:05

## 2024-01-01 RX ADMIN — CHLORHEXIDINE GLUCONATE 15 MILLILITER(S): 213 SOLUTION TOPICAL at 05:12

## 2024-01-01 RX ADMIN — Medication 100 MILLIGRAM(S): at 01:41

## 2024-01-01 RX ADMIN — Medication 100 MILLIGRAM(S): at 12:38

## 2024-01-01 RX ADMIN — HEPARIN SODIUM 5000 UNIT(S): 5000 INJECTION INTRAVENOUS; SUBCUTANEOUS at 05:04

## 2024-01-01 RX ADMIN — FENTANYL CITRATE 5.53 MICROGRAM(S)/KG/HR: 50 INJECTION INTRAVENOUS at 19:30

## 2024-01-01 RX ADMIN — PROPOFOL 27.6 MICROGRAM(S)/KG/MIN: 10 INJECTION, EMULSION INTRAVENOUS at 11:59

## 2024-01-01 RX ADMIN — LACTULOSE 30 GRAM(S): 10 SOLUTION ORAL at 11:13

## 2024-01-01 RX ADMIN — Medication 400 MILLIGRAM(S): at 15:12

## 2024-01-01 RX ADMIN — PANTOPRAZOLE SODIUM 40 MILLIGRAM(S): 20 TABLET, DELAYED RELEASE ORAL at 17:14

## 2024-01-01 RX ADMIN — PANTOPRAZOLE SODIUM 40 MILLIGRAM(S): 20 TABLET, DELAYED RELEASE ORAL at 05:04

## 2024-01-01 RX ADMIN — Medication 100 MILLIGRAM(S): at 16:58

## 2024-01-01 RX ADMIN — PROPOFOL 27.6 MICROGRAM(S)/KG/MIN: 10 INJECTION, EMULSION INTRAVENOUS at 08:18

## 2024-01-01 RX ADMIN — CISATRACURIUM BESYLATE 20 MILLIGRAM(S): 2 INJECTION INTRAVENOUS at 16:04

## 2024-01-01 RX ADMIN — VASOPRESSIN 6 UNIT(S)/MIN: 20 INJECTION INTRAVENOUS at 07:36

## 2024-01-01 RX ADMIN — Medication 40 MILLIGRAM(S): at 17:52

## 2024-01-01 RX ADMIN — Medication 3 MILLILITER(S): at 03:33

## 2024-01-01 RX ADMIN — CHLORHEXIDINE GLUCONATE 15 MILLILITER(S): 213 SOLUTION TOPICAL at 17:17

## 2024-01-01 RX ADMIN — KETAMINE HYDROCHLORIDE 13.8 MG/KG/HR: 100 INJECTION INTRAMUSCULAR; INTRAVENOUS at 19:13

## 2024-01-01 RX ADMIN — Medication 1 APPLICATION(S): at 18:33

## 2024-01-01 RX ADMIN — SENNA PLUS 15 MILLILITER(S): 8.6 TABLET ORAL at 22:03

## 2024-01-01 RX ADMIN — POLYETHYLENE GLYCOL 3350 17 GRAM(S): 17 POWDER, FOR SOLUTION ORAL at 17:14

## 2024-01-01 RX ADMIN — CHLORHEXIDINE GLUCONATE 15 MILLILITER(S): 213 SOLUTION TOPICAL at 17:20

## 2024-01-01 RX ADMIN — Medication 125 MILLILITER(S): at 23:50

## 2024-01-01 RX ADMIN — POLYETHYLENE GLYCOL 3350 17 GRAM(S): 17 POWDER, FOR SOLUTION ORAL at 18:06

## 2024-01-01 RX ADMIN — DEXMEDETOMIDINE HYDROCHLORIDE IN 0.9% SODIUM CHLORIDE 6.91 MICROGRAM(S)/KG/HR: 4 INJECTION INTRAVENOUS at 07:37

## 2024-01-01 RX ADMIN — VASOPRESSIN 6 UNIT(S)/MIN: 20 INJECTION INTRAVENOUS at 16:47

## 2024-01-01 RX ADMIN — VASOPRESSIN 6 UNIT(S)/MIN: 20 INJECTION INTRAVENOUS at 17:27

## 2024-01-01 RX ADMIN — DEXMEDETOMIDINE HYDROCHLORIDE IN 0.9% SODIUM CHLORIDE 6.91 MICROGRAM(S)/KG/HR: 4 INJECTION INTRAVENOUS at 19:21

## 2024-01-01 RX ADMIN — OCTREOTIDE ACETATE 10 MICROGRAM(S)/HR: 200 INJECTION, SOLUTION INTRAVENOUS; SUBCUTANEOUS at 07:19

## 2024-01-01 RX ADMIN — CHLORHEXIDINE GLUCONATE 15 MILLILITER(S): 213 SOLUTION TOPICAL at 05:31

## 2024-01-01 RX ADMIN — DEXMEDETOMIDINE HYDROCHLORIDE IN 0.9% SODIUM CHLORIDE 6.91 MICROGRAM(S)/KG/HR: 4 INJECTION INTRAVENOUS at 08:00

## 2024-01-01 RX ADMIN — Medication 3 MILLILITER(S): at 10:28

## 2024-01-01 RX ADMIN — Medication 250 MILLIGRAM(S): at 23:04

## 2024-01-01 RX ADMIN — SENNA PLUS 2 TABLET(S): 8.6 TABLET ORAL at 22:03

## 2024-01-01 RX ADMIN — Medication 400 MILLIGRAM(S): at 02:00

## 2024-01-01 RX ADMIN — PROPOFOL 27.6 MICROGRAM(S)/KG/MIN: 10 INJECTION, EMULSION INTRAVENOUS at 07:16

## 2024-01-01 RX ADMIN — Medication 200 GRAM(S): at 08:01

## 2024-01-01 RX ADMIN — Medication 102 MILLIGRAM(S): at 11:11

## 2024-01-01 RX ADMIN — BUMETANIDE 12.5 MG/HR: 0.25 INJECTION INTRAMUSCULAR; INTRAVENOUS at 07:19

## 2024-01-01 RX ADMIN — KETAMINE HYDROCHLORIDE 23 MG/KG/HR: 100 INJECTION INTRAMUSCULAR; INTRAVENOUS at 07:16

## 2024-01-01 RX ADMIN — Medication 320 GRAM(S): at 14:25

## 2024-01-01 RX ADMIN — Medication 1 TABLET(S): at 11:57

## 2024-01-01 RX ADMIN — Medication 200 GRAM(S): at 08:41

## 2024-01-01 RX ADMIN — PANTOPRAZOLE SODIUM 10 MG/HR: 20 TABLET, DELAYED RELEASE ORAL at 20:42

## 2024-01-01 RX ADMIN — CHLORHEXIDINE GLUCONATE 1 APPLICATION(S): 213 SOLUTION TOPICAL at 12:32

## 2024-01-01 RX ADMIN — FLUCONAZOLE 100 MILLIGRAM(S): 150 TABLET ORAL at 17:52

## 2024-01-01 RX ADMIN — Medication 3 MILLILITER(S): at 14:55

## 2024-01-01 RX ADMIN — Medication 50 MILLIGRAM(S): at 23:38

## 2024-01-01 RX ADMIN — KETAMINE HYDROCHLORIDE 4.61 MG/KG/HR: 100 INJECTION INTRAMUSCULAR; INTRAVENOUS at 10:58

## 2024-01-01 RX ADMIN — BUMETANIDE 2 MILLIGRAM(S): 0.25 INJECTION INTRAMUSCULAR; INTRAVENOUS at 03:15

## 2024-01-01 RX ADMIN — Medication 125 MILLILITER(S): at 12:30

## 2024-01-01 RX ADMIN — VASOPRESSIN 6 UNIT(S)/MIN: 20 INJECTION INTRAVENOUS at 19:28

## 2024-01-01 RX ADMIN — DEXMEDETOMIDINE HYDROCHLORIDE IN 0.9% SODIUM CHLORIDE 6.91 MICROGRAM(S)/KG/HR: 4 INJECTION INTRAVENOUS at 07:17

## 2024-01-01 RX ADMIN — LACTULOSE 30 GRAM(S): 10 SOLUTION ORAL at 16:55

## 2024-01-01 RX ADMIN — Medication 50 MILLIGRAM(S): at 05:22

## 2024-01-01 RX ADMIN — POLYETHYLENE GLYCOL 3350 17 GRAM(S): 17 POWDER, FOR SOLUTION ORAL at 07:43

## 2024-01-01 RX ADMIN — SENNA PLUS 2 TABLET(S): 8.6 TABLET ORAL at 21:03

## 2024-01-01 RX ADMIN — PIPERACILLIN AND TAZOBACTAM 25 GRAM(S): 4; .5 INJECTION, POWDER, LYOPHILIZED, FOR SOLUTION INTRAVENOUS at 05:17

## 2024-01-01 RX ADMIN — CISATRACURIUM BESYLATE 5.53 MICROGRAM(S)/KG/MIN: 2 INJECTION INTRAVENOUS at 07:45

## 2024-01-01 RX ADMIN — HEPARIN SODIUM 5000 UNIT(S): 5000 INJECTION INTRAVENOUS; SUBCUTANEOUS at 06:00

## 2024-01-01 RX ADMIN — PANTOPRAZOLE SODIUM 10 MG/HR: 20 TABLET, DELAYED RELEASE ORAL at 11:36

## 2024-01-01 RX ADMIN — Medication 100 MILLIGRAM(S): at 02:03

## 2024-01-01 RX ADMIN — CHLORHEXIDINE GLUCONATE 1 APPLICATION(S): 213 SOLUTION TOPICAL at 11:12

## 2024-01-01 RX ADMIN — Medication 100 MILLIGRAM(S): at 11:12

## 2024-01-01 RX ADMIN — PROPOFOL 27.6 MICROGRAM(S)/KG/MIN: 10 INJECTION, EMULSION INTRAVENOUS at 19:35

## 2024-01-01 RX ADMIN — PROPOFOL 27.6 MICROGRAM(S)/KG/MIN: 10 INJECTION, EMULSION INTRAVENOUS at 19:21

## 2024-01-01 RX ADMIN — CHLORHEXIDINE GLUCONATE 1 APPLICATION(S): 213 SOLUTION TOPICAL at 11:24

## 2024-01-01 RX ADMIN — FENTANYL CITRATE 27.6 MICROGRAM(S)/KG/HR: 50 INJECTION INTRAVENOUS at 07:45

## 2024-01-01 RX ADMIN — CHLORHEXIDINE GLUCONATE 15 MILLILITER(S): 213 SOLUTION TOPICAL at 17:16

## 2024-01-01 RX ADMIN — Medication 40 MILLIEQUIVALENT(S): at 05:04

## 2024-01-01 RX ADMIN — Medication 100 MILLIGRAM(S): at 13:19

## 2024-01-01 RX ADMIN — CHLORHEXIDINE GLUCONATE 1 APPLICATION(S): 213 SOLUTION TOPICAL at 11:21

## 2024-01-01 RX ADMIN — Medication 12.1 MICROGRAM(S)/KG/MIN: at 22:02

## 2024-01-01 RX ADMIN — LACTULOSE 30 GRAM(S): 10 SOLUTION ORAL at 12:01

## 2024-01-01 RX ADMIN — Medication 3 MILLILITER(S): at 21:24

## 2024-01-01 RX ADMIN — Medication 100 MILLIGRAM(S): at 13:35

## 2024-01-01 RX ADMIN — Medication 40 MILLIGRAM(S): at 05:05

## 2024-01-01 RX ADMIN — VASOPRESSIN 6 UNIT(S)/MIN: 20 INJECTION INTRAVENOUS at 17:49

## 2024-01-01 RX ADMIN — PIPERACILLIN AND TAZOBACTAM 25 GRAM(S): 4; .5 INJECTION, POWDER, LYOPHILIZED, FOR SOLUTION INTRAVENOUS at 22:13

## 2024-01-01 RX ADMIN — Medication 50 MILLIEQUIVALENT(S): at 23:55

## 2024-01-01 RX ADMIN — Medication 12.1 MICROGRAM(S)/KG/MIN: at 19:32

## 2024-01-01 RX ADMIN — Medication 1000 MILLIGRAM(S): at 19:20

## 2024-01-01 RX ADMIN — LACTULOSE 30 GRAM(S): 10 SOLUTION ORAL at 22:03

## 2024-01-01 RX ADMIN — Medication 50 MILLIGRAM(S): at 03:44

## 2024-01-01 RX ADMIN — VASOPRESSIN 6 UNIT(S)/MIN: 20 INJECTION INTRAVENOUS at 19:13

## 2024-01-01 RX ADMIN — VASOPRESSIN 6 UNIT(S)/MIN: 20 INJECTION INTRAVENOUS at 07:17

## 2024-01-01 RX ADMIN — Medication 4.32 MICROGRAM(S)/KG/MIN: at 07:38

## 2024-01-01 RX ADMIN — FENTANYL CITRATE 5.53 MICROGRAM(S)/KG/HR: 50 INJECTION INTRAVENOUS at 17:27

## 2024-01-01 RX ADMIN — PROPOFOL 27.6 MICROGRAM(S)/KG/MIN: 10 INJECTION, EMULSION INTRAVENOUS at 19:28

## 2024-01-01 RX ADMIN — CISATRACURIUM BESYLATE 11.1 MICROGRAM(S)/KG/MIN: 2 INJECTION INTRAVENOUS at 07:17

## 2024-01-01 RX ADMIN — PIPERACILLIN AND TAZOBACTAM 25 GRAM(S): 4; .5 INJECTION, POWDER, LYOPHILIZED, FOR SOLUTION INTRAVENOUS at 08:16

## 2024-01-01 RX ADMIN — VASOPRESSIN 6 UNIT(S)/MIN: 20 INJECTION INTRAVENOUS at 07:18

## 2024-01-01 RX ADMIN — METHYLNALTREXONE BROMIDE 12 MILLIGRAM(S): 12 INJECTION, SOLUTION SUBCUTANEOUS at 16:08

## 2024-01-01 RX ADMIN — DEXMEDETOMIDINE HYDROCHLORIDE IN 0.9% SODIUM CHLORIDE 6.91 MICROGRAM(S)/KG/HR: 4 INJECTION INTRAVENOUS at 19:31

## 2024-01-01 RX ADMIN — VASOPRESSIN 6 UNIT(S)/MIN: 20 INJECTION INTRAVENOUS at 07:16

## 2024-01-01 RX ADMIN — BUMETANIDE 2 MILLIGRAM(S): 0.25 INJECTION INTRAMUSCULAR; INTRAVENOUS at 23:41

## 2024-01-01 RX ADMIN — Medication 25 GRAM(S): at 17:19

## 2024-01-01 RX ADMIN — Medication 50 MILLIGRAM(S): at 12:49

## 2024-01-01 RX ADMIN — Medication 5 MILLIGRAM(S): at 21:38

## 2024-01-01 RX ADMIN — FENTANYL CITRATE 27.6 MICROGRAM(S)/KG/HR: 50 INJECTION INTRAVENOUS at 17:38

## 2024-01-01 RX ADMIN — Medication 50 MILLILITER(S): at 07:49

## 2024-01-01 RX ADMIN — Medication 4.32 MICROGRAM(S)/KG/MIN: at 09:06

## 2024-01-01 RX ADMIN — LACTULOSE 30 GRAM(S): 10 SOLUTION ORAL at 13:07

## 2024-01-01 RX ADMIN — BUMETANIDE 2 MG/HR: 0.25 INJECTION INTRAMUSCULAR; INTRAVENOUS at 07:12

## 2024-01-01 RX ADMIN — OCTREOTIDE ACETATE 10 MICROGRAM(S)/HR: 200 INJECTION, SOLUTION INTRAVENOUS; SUBCUTANEOUS at 14:58

## 2024-01-01 RX ADMIN — PROPOFOL 27.6 MICROGRAM(S)/KG/MIN: 10 INJECTION, EMULSION INTRAVENOUS at 16:46

## 2024-01-01 RX ADMIN — Medication 3 MILLILITER(S): at 15:13

## 2024-01-01 RX ADMIN — Medication 12.1 MICROGRAM(S)/KG/MIN: at 07:22

## 2024-01-01 RX ADMIN — Medication 100 MILLIGRAM(S): at 11:52

## 2024-01-01 RX ADMIN — Medication 3 MILLILITER(S): at 09:44

## 2024-01-01 RX ADMIN — Medication 200 GRAM(S): at 13:07

## 2024-01-01 RX ADMIN — PROPOFOL 27.6 MICROGRAM(S)/KG/MIN: 10 INJECTION, EMULSION INTRAVENOUS at 09:38

## 2024-01-01 RX ADMIN — OCTREOTIDE ACETATE 10 MICROGRAM(S)/HR: 200 INJECTION, SOLUTION INTRAVENOUS; SUBCUTANEOUS at 07:16

## 2024-01-01 RX ADMIN — PIPERACILLIN AND TAZOBACTAM 25 GRAM(S): 4; .5 INJECTION, POWDER, LYOPHILIZED, FOR SOLUTION INTRAVENOUS at 17:37

## 2024-01-01 RX ADMIN — PROPOFOL 27.6 MICROGRAM(S)/KG/MIN: 10 INJECTION, EMULSION INTRAVENOUS at 19:12

## 2024-01-01 RX ADMIN — CHLORHEXIDINE GLUCONATE 1 APPLICATION(S): 213 SOLUTION TOPICAL at 12:08

## 2024-01-01 RX ADMIN — Medication 100 MILLILITER(S): at 07:16

## 2024-01-01 RX ADMIN — Medication 100 MILLILITER(S): at 19:33

## 2024-01-01 RX ADMIN — POLYETHYLENE GLYCOL 3350 17 GRAM(S): 17 POWDER, FOR SOLUTION ORAL at 17:09

## 2024-01-01 RX ADMIN — Medication 1 APPLICATION(S): at 05:14

## 2024-01-01 RX ADMIN — PANTOPRAZOLE SODIUM 40 MILLIGRAM(S): 20 TABLET, DELAYED RELEASE ORAL at 05:39

## 2024-01-01 RX ADMIN — FENTANYL CITRATE 27.6 MICROGRAM(S)/KG/HR: 50 INJECTION INTRAVENOUS at 23:29

## 2024-01-01 RX ADMIN — KETAMINE HYDROCHLORIDE 4.61 MG/KG/HR: 100 INJECTION INTRAMUSCULAR; INTRAVENOUS at 17:28

## 2024-01-01 RX ADMIN — FENTANYL CITRATE 27.6 MICROGRAM(S)/KG/HR: 50 INJECTION INTRAVENOUS at 05:31

## 2024-01-01 RX ADMIN — Medication 4 MILLIGRAM(S): at 09:35

## 2024-01-01 RX ADMIN — OCTREOTIDE ACETATE 10 MICROGRAM(S)/HR: 200 INJECTION, SOLUTION INTRAVENOUS; SUBCUTANEOUS at 19:22

## 2024-01-01 RX ADMIN — Medication 15 MILLILITER(S): at 11:24

## 2024-01-01 RX ADMIN — Medication 12.1 MICROGRAM(S)/KG/MIN: at 08:18

## 2024-01-01 RX ADMIN — MUPIROCIN 1 APPLICATION(S): 20 OINTMENT TOPICAL at 17:57

## 2024-01-01 RX ADMIN — Medication 1 APPLICATION(S): at 05:04

## 2024-01-01 RX ADMIN — PANTOPRAZOLE SODIUM 10 MG/HR: 20 TABLET, DELAYED RELEASE ORAL at 10:02

## 2024-01-01 RX ADMIN — Medication 3 MILLILITER(S): at 07:06

## 2024-01-01 RX ADMIN — PANTOPRAZOLE SODIUM 40 MILLIGRAM(S): 20 TABLET, DELAYED RELEASE ORAL at 17:04

## 2024-01-01 RX ADMIN — POLYETHYLENE GLYCOL 3350 17 GRAM(S): 17 POWDER, FOR SOLUTION ORAL at 05:05

## 2024-01-01 RX ADMIN — Medication 3 MILLILITER(S): at 08:23

## 2024-01-01 RX ADMIN — PROPOFOL 27.6 MICROGRAM(S)/KG/MIN: 10 INJECTION, EMULSION INTRAVENOUS at 19:38

## 2024-01-01 RX ADMIN — Medication 12.1 MICROGRAM(S)/KG/MIN: at 19:27

## 2024-01-01 RX ADMIN — DEXMEDETOMIDINE HYDROCHLORIDE IN 0.9% SODIUM CHLORIDE 6.91 MICROGRAM(S)/KG/HR: 4 INJECTION INTRAVENOUS at 19:35

## 2024-01-01 RX ADMIN — CHLORHEXIDINE GLUCONATE 15 MILLILITER(S): 213 SOLUTION TOPICAL at 07:45

## 2024-01-01 RX ADMIN — Medication 4.32 MICROGRAM(S)/KG/MIN: at 07:37

## 2024-01-01 RX ADMIN — Medication 100 MILLILITER(S): at 16:07

## 2024-01-01 RX ADMIN — Medication 15 MILLILITER(S): at 11:53

## 2024-01-01 RX ADMIN — Medication 100 MILLIGRAM(S): at 11:24

## 2024-01-01 RX ADMIN — PANTOPRAZOLE SODIUM 40 MILLIGRAM(S): 20 TABLET, DELAYED RELEASE ORAL at 05:45

## 2024-01-01 RX ADMIN — Medication 100 MILLILITER(S): at 08:00

## 2024-01-01 RX ADMIN — PANTOPRAZOLE SODIUM 10 MG/HR: 20 TABLET, DELAYED RELEASE ORAL at 21:44

## 2024-01-01 RX ADMIN — SENNA PLUS 2 TABLET(S): 8.6 TABLET ORAL at 21:39

## 2024-01-01 RX ADMIN — PANTOPRAZOLE SODIUM 40 MILLIGRAM(S): 20 TABLET, DELAYED RELEASE ORAL at 17:17

## 2024-01-01 RX ADMIN — PROPOFOL 27.6 MICROGRAM(S)/KG/MIN: 10 INJECTION, EMULSION INTRAVENOUS at 19:32

## 2024-01-01 RX ADMIN — BUMETANIDE 7.5 MG/HR: 0.25 INJECTION INTRAMUSCULAR; INTRAVENOUS at 19:40

## 2024-01-01 RX ADMIN — Medication 50 MILLIGRAM(S): at 00:37

## 2024-01-01 RX ADMIN — Medication 1000 MILLIGRAM(S): at 03:05

## 2024-01-01 RX ADMIN — HEPARIN SODIUM 5000 UNIT(S): 5000 INJECTION INTRAVENOUS; SUBCUTANEOUS at 18:15

## 2024-01-01 RX ADMIN — VASOPRESSIN 6 UNIT(S)/MIN: 20 INJECTION INTRAVENOUS at 07:37

## 2024-01-01 RX ADMIN — KETAMINE HYDROCHLORIDE 4.61 MG/KG/HR: 100 INJECTION INTRAMUSCULAR; INTRAVENOUS at 19:20

## 2024-01-01 RX ADMIN — FENTANYL CITRATE 5.53 MICROGRAM(S)/KG/HR: 50 INJECTION INTRAVENOUS at 07:37

## 2024-01-01 RX ADMIN — PIPERACILLIN AND TAZOBACTAM 25 GRAM(S): 4; .5 INJECTION, POWDER, LYOPHILIZED, FOR SOLUTION INTRAVENOUS at 22:03

## 2024-01-01 RX ADMIN — Medication 4.32 MICROGRAM(S)/KG/MIN: at 18:13

## 2024-01-01 RX ADMIN — CEFEPIME 100 MILLIGRAM(S): 1 INJECTION, POWDER, FOR SOLUTION INTRAMUSCULAR; INTRAVENOUS at 11:24

## 2024-01-01 RX ADMIN — Medication 3 MILLILITER(S): at 02:59

## 2024-01-01 RX ADMIN — MUPIROCIN 1 APPLICATION(S): 20 OINTMENT TOPICAL at 17:19

## 2024-01-01 RX ADMIN — PANTOPRAZOLE SODIUM 40 MILLIGRAM(S): 20 TABLET, DELAYED RELEASE ORAL at 05:11

## 2024-01-01 RX ADMIN — CEFEPIME 100 MILLIGRAM(S): 1 INJECTION, POWDER, FOR SOLUTION INTRAMUSCULAR; INTRAVENOUS at 03:00

## 2024-01-01 RX ADMIN — Medication 1 MILLIGRAM(S): at 16:56

## 2024-01-01 RX ADMIN — FENTANYL CITRATE 100 MICROGRAM(S): 50 INJECTION INTRAVENOUS at 16:20

## 2024-01-01 RX ADMIN — CHLORHEXIDINE GLUCONATE 15 MILLILITER(S): 213 SOLUTION TOPICAL at 05:06

## 2024-01-01 RX ADMIN — Medication 4.32 MICROGRAM(S)/KG/MIN: at 07:36

## 2024-01-01 RX ADMIN — KETAMINE HYDROCHLORIDE 13.8 MG/KG/HR: 100 INJECTION INTRAMUSCULAR; INTRAVENOUS at 19:34

## 2024-01-01 RX ADMIN — CHLORHEXIDINE GLUCONATE 1 APPLICATION(S): 213 SOLUTION TOPICAL at 11:07

## 2024-01-01 RX ADMIN — POLYETHYLENE GLYCOL 3350 17 GRAM(S): 17 POWDER, FOR SOLUTION ORAL at 17:17

## 2024-01-01 RX ADMIN — Medication 3 MILLILITER(S): at 09:47

## 2024-01-01 RX ADMIN — PANTOPRAZOLE SODIUM 40 MILLIGRAM(S): 20 TABLET, DELAYED RELEASE ORAL at 17:42

## 2024-01-01 RX ADMIN — Medication 50 MILLILITER(S): at 23:41

## 2024-01-01 RX ADMIN — HYDROMORPHONE HYDROCHLORIDE 2 MILLIGRAM(S): 2 INJECTION INTRAMUSCULAR; INTRAVENOUS; SUBCUTANEOUS at 13:00

## 2024-01-01 RX ADMIN — CISATRACURIUM BESYLATE 11.1 MICROGRAM(S)/KG/MIN: 2 INJECTION INTRAVENOUS at 07:16

## 2024-01-01 RX ADMIN — Medication 3 MILLILITER(S): at 07:21

## 2024-01-01 RX ADMIN — Medication 250 MILLIGRAM(S): at 10:33

## 2024-01-01 RX ADMIN — OCTREOTIDE ACETATE 10 MICROGRAM(S)/HR: 200 INJECTION, SOLUTION INTRAVENOUS; SUBCUTANEOUS at 17:43

## 2024-01-01 RX ADMIN — FENTANYL CITRATE 27.6 MICROGRAM(S)/KG/HR: 50 INJECTION INTRAVENOUS at 19:29

## 2024-01-01 RX ADMIN — KETAMINE HYDROCHLORIDE 13.8 MG/KG/HR: 100 INJECTION INTRAMUSCULAR; INTRAVENOUS at 08:00

## 2024-01-01 RX ADMIN — POTASSIUM PHOSPHATE, MONOBASIC POTASSIUM PHOSPHATE, DIBASIC 83.33 MILLIMOLE(S): 236; 224 INJECTION, SOLUTION INTRAVENOUS at 05:21

## 2024-01-01 RX ADMIN — VASOPRESSIN 6 UNIT(S)/MIN: 20 INJECTION INTRAVENOUS at 20:41

## 2024-01-01 RX ADMIN — POTASSIUM PHOSPHATE, MONOBASIC POTASSIUM PHOSPHATE, DIBASIC 83.33 MILLIMOLE(S): 236; 224 INJECTION, SOLUTION INTRAVENOUS at 05:06

## 2024-01-01 RX ADMIN — Medication 4.32 MICROGRAM(S)/KG/MIN: at 11:59

## 2024-01-01 RX ADMIN — CHLORHEXIDINE GLUCONATE 15 MILLILITER(S): 213 SOLUTION TOPICAL at 17:04

## 2024-01-01 RX ADMIN — Medication 100 MILLIEQUIVALENT(S): at 16:17

## 2024-01-01 RX ADMIN — Medication 130.75 GRAM(S): at 15:40

## 2024-01-01 RX ADMIN — POLYETHYLENE GLYCOL 3350 17 GRAM(S): 17 POWDER, FOR SOLUTION ORAL at 05:20

## 2024-01-01 RX ADMIN — Medication 12.1 MICROGRAM(S)/KG/MIN: at 19:20

## 2024-01-01 RX ADMIN — VASOPRESSIN 6 UNIT(S)/MIN: 20 INJECTION INTRAVENOUS at 19:21

## 2024-01-01 RX ADMIN — Medication 102 MILLIGRAM(S): at 12:02

## 2024-01-01 RX ADMIN — Medication 3 MILLILITER(S): at 08:09

## 2024-01-01 RX ADMIN — VASOPRESSIN 6 UNIT(S)/MIN: 20 INJECTION INTRAVENOUS at 07:12

## 2024-01-01 RX ADMIN — VASOPRESSIN 6 UNIT(S)/MIN: 20 INJECTION INTRAVENOUS at 19:29

## 2024-01-01 RX ADMIN — FLUCONAZOLE 100 MILLIGRAM(S): 150 TABLET ORAL at 22:03

## 2024-01-01 RX ADMIN — Medication 4.32 MICROGRAM(S)/KG/MIN: at 08:57

## 2024-01-01 RX ADMIN — CISATRACURIUM BESYLATE 5.53 MICROGRAM(S)/KG/MIN: 2 INJECTION INTRAVENOUS at 02:34

## 2024-01-01 RX ADMIN — TRANEXAMIC ACID 500 MILLIGRAM(S): 100 INJECTION, SOLUTION INTRAVENOUS at 07:22

## 2024-01-01 RX ADMIN — Medication 50 MILLILITER(S): at 23:54

## 2024-01-01 RX ADMIN — PIPERACILLIN AND TAZOBACTAM 25 GRAM(S): 4; .5 INJECTION, POWDER, LYOPHILIZED, FOR SOLUTION INTRAVENOUS at 05:23

## 2024-01-01 RX ADMIN — VASOPRESSIN 6 UNIT(S)/MIN: 20 INJECTION INTRAVENOUS at 18:13

## 2024-01-01 RX ADMIN — Medication 100 MILLIGRAM(S): at 01:31

## 2024-01-01 RX ADMIN — PIPERACILLIN AND TAZOBACTAM 25 GRAM(S): 4; .5 INJECTION, POWDER, LYOPHILIZED, FOR SOLUTION INTRAVENOUS at 13:30

## 2024-01-01 RX ADMIN — PROPOFOL 27.6 MICROGRAM(S)/KG/MIN: 10 INJECTION, EMULSION INTRAVENOUS at 13:20

## 2024-01-01 RX ADMIN — Medication 4.32 MICROGRAM(S)/KG/MIN: at 16:08

## 2024-01-01 RX ADMIN — DEXMEDETOMIDINE HYDROCHLORIDE IN 0.9% SODIUM CHLORIDE 6.91 MICROGRAM(S)/KG/HR: 4 INJECTION INTRAVENOUS at 07:36

## 2024-01-01 RX ADMIN — Medication 3 MILLILITER(S): at 21:15

## 2024-01-01 RX ADMIN — Medication 50 MILLILITER(S): at 05:40

## 2024-01-01 RX ADMIN — Medication 3 MILLILITER(S): at 15:29

## 2024-01-01 RX ADMIN — CHLORHEXIDINE GLUCONATE 15 MILLILITER(S): 213 SOLUTION TOPICAL at 17:53

## 2024-01-01 RX ADMIN — Medication 100 MILLIGRAM(S): at 22:03

## 2024-01-01 RX ADMIN — KETAMINE HYDROCHLORIDE 23 MG/KG/HR: 100 INJECTION INTRAMUSCULAR; INTRAVENOUS at 19:38

## 2024-01-01 RX ADMIN — CHLORHEXIDINE GLUCONATE 1 APPLICATION(S): 213 SOLUTION TOPICAL at 12:02

## 2024-01-01 RX ADMIN — PIPERACILLIN AND TAZOBACTAM 25 GRAM(S): 4; .5 INJECTION, POWDER, LYOPHILIZED, FOR SOLUTION INTRAVENOUS at 21:04

## 2024-01-01 RX ADMIN — Medication 1 MILLIGRAM(S): at 11:24

## 2024-01-01 RX ADMIN — CHLORHEXIDINE GLUCONATE 1 APPLICATION(S): 213 SOLUTION TOPICAL at 11:16

## 2024-01-01 RX ADMIN — VASOPRESSIN 6 UNIT(S)/MIN: 20 INJECTION INTRAVENOUS at 07:38

## 2024-01-01 RX ADMIN — CEFEPIME 100 MILLIGRAM(S): 1 INJECTION, POWDER, FOR SOLUTION INTRAMUSCULAR; INTRAVENOUS at 02:52

## 2024-01-01 RX ADMIN — CHLORHEXIDINE GLUCONATE 1 APPLICATION(S): 213 SOLUTION TOPICAL at 12:10

## 2024-01-01 RX ADMIN — PROPOFOL 27.6 MICROGRAM(S)/KG/MIN: 10 INJECTION, EMULSION INTRAVENOUS at 07:32

## 2024-01-01 RX ADMIN — CHLORHEXIDINE GLUCONATE 15 MILLILITER(S): 213 SOLUTION TOPICAL at 16:59

## 2024-01-01 RX ADMIN — CEFEPIME 100 MILLIGRAM(S): 1 INJECTION, POWDER, FOR SOLUTION INTRAMUSCULAR; INTRAVENOUS at 12:32

## 2024-01-01 RX ADMIN — OCTREOTIDE ACETATE 10 MICROGRAM(S)/HR: 200 INJECTION, SOLUTION INTRAVENOUS; SUBCUTANEOUS at 19:41

## 2024-01-01 RX ADMIN — KETAMINE HYDROCHLORIDE 4.61 MG/KG/HR: 100 INJECTION INTRAMUSCULAR; INTRAVENOUS at 21:05

## 2024-01-01 RX ADMIN — CHLORHEXIDINE GLUCONATE 15 MILLILITER(S): 213 SOLUTION TOPICAL at 05:21

## 2024-01-01 RX ADMIN — OCTREOTIDE ACETATE 10 MICROGRAM(S)/HR: 200 INJECTION, SOLUTION INTRAVENOUS; SUBCUTANEOUS at 19:29

## 2024-01-01 RX ADMIN — Medication 50 MILLILITER(S): at 17:55

## 2024-01-01 RX ADMIN — VASOPRESSIN 6 UNIT(S)/MIN: 20 INJECTION INTRAVENOUS at 10:02

## 2024-01-01 RX ADMIN — Medication 75 MILLILITER(S): at 16:47

## 2024-01-01 RX ADMIN — Medication 1 MILLIGRAM(S): at 11:58

## 2024-01-01 RX ADMIN — Medication 4.32 MICROGRAM(S)/KG/MIN: at 19:35

## 2024-01-01 RX ADMIN — Medication 3 MILLILITER(S): at 04:59

## 2024-01-01 RX ADMIN — PROPOFOL 27.6 MICROGRAM(S)/KG/MIN: 10 INJECTION, EMULSION INTRAVENOUS at 11:37

## 2024-01-01 RX ADMIN — PROPOFOL 27.6 MICROGRAM(S)/KG/MIN: 10 INJECTION, EMULSION INTRAVENOUS at 18:14

## 2024-01-01 RX ADMIN — Medication 1 APPLICATION(S): at 17:04

## 2024-01-01 RX ADMIN — Medication 5 MILLIGRAM(S): at 05:21

## 2024-01-01 RX ADMIN — Medication 50 MILLIEQUIVALENT(S): at 00:02

## 2024-01-01 RX ADMIN — LACTULOSE 30 GRAM(S): 10 SOLUTION ORAL at 21:41

## 2024-01-01 RX ADMIN — Medication 100 MILLIGRAM(S): at 02:57

## 2024-01-01 RX ADMIN — Medication 1 APPLICATION(S): at 05:24

## 2024-01-01 RX ADMIN — CHLORHEXIDINE GLUCONATE 15 MILLILITER(S): 213 SOLUTION TOPICAL at 05:44

## 2024-01-01 RX ADMIN — DEXMEDETOMIDINE HYDROCHLORIDE IN 0.9% SODIUM CHLORIDE 6.91 MICROGRAM(S)/KG/HR: 4 INJECTION INTRAVENOUS at 19:38

## 2024-01-01 RX ADMIN — Medication 4.32 MICROGRAM(S)/KG/MIN: at 20:46

## 2024-01-01 RX ADMIN — CEFEPIME 100 MILLIGRAM(S): 1 INJECTION, POWDER, FOR SOLUTION INTRAMUSCULAR; INTRAVENOUS at 03:14

## 2024-01-01 RX ADMIN — FENTANYL CITRATE 5.53 MICROGRAM(S)/KG/HR: 50 INJECTION INTRAVENOUS at 22:02

## 2024-01-01 RX ADMIN — POLYETHYLENE GLYCOL 3350 17 GRAM(S): 17 POWDER, FOR SOLUTION ORAL at 17:18

## 2024-01-01 RX ADMIN — VASOPRESSIN 6 UNIT(S)/MIN: 20 INJECTION INTRAVENOUS at 19:34

## 2024-01-01 RX ADMIN — Medication 50 MILLIGRAM(S): at 17:15

## 2024-01-01 RX ADMIN — KETAMINE HYDROCHLORIDE 4.61 MG/KG/HR: 100 INJECTION INTRAMUSCULAR; INTRAVENOUS at 22:02

## 2024-01-01 RX ADMIN — Medication 4.32 MICROGRAM(S)/KG/MIN: at 11:10

## 2024-01-01 RX ADMIN — PROPOFOL 27.6 MICROGRAM(S)/KG/MIN: 10 INJECTION, EMULSION INTRAVENOUS at 02:00

## 2024-01-01 RX ADMIN — KETAMINE HYDROCHLORIDE 4.61 MG/KG/HR: 100 INJECTION INTRAMUSCULAR; INTRAVENOUS at 08:18

## 2024-01-01 RX ADMIN — Medication 1 APPLICATION(S): at 05:17

## 2024-01-01 RX ADMIN — Medication 12.1 MICROGRAM(S)/KG/MIN: at 07:39

## 2024-01-01 RX ADMIN — DEXMEDETOMIDINE HYDROCHLORIDE IN 0.9% SODIUM CHLORIDE 6.91 MICROGRAM(S)/KG/HR: 4 INJECTION INTRAVENOUS at 16:45

## 2024-01-01 RX ADMIN — CHLORHEXIDINE GLUCONATE 15 MILLILITER(S): 213 SOLUTION TOPICAL at 17:09

## 2024-01-01 RX ADMIN — Medication 50 MILLILITER(S): at 04:05

## 2024-01-01 RX ADMIN — PROPOFOL 27.6 MICROGRAM(S)/KG/MIN: 10 INJECTION, EMULSION INTRAVENOUS at 07:39

## 2024-01-01 RX ADMIN — CEFEPIME 100 MILLIGRAM(S): 1 INJECTION, POWDER, FOR SOLUTION INTRAMUSCULAR; INTRAVENOUS at 18:37

## 2024-01-01 RX ADMIN — KETAMINE HYDROCHLORIDE 4.61 MG/KG/HR: 100 INJECTION INTRAMUSCULAR; INTRAVENOUS at 07:31

## 2024-01-01 RX ADMIN — CEFEPIME 100 MILLIGRAM(S): 1 INJECTION, POWDER, FOR SOLUTION INTRAMUSCULAR; INTRAVENOUS at 18:20

## 2024-01-01 RX ADMIN — Medication 102 MILLIGRAM(S): at 06:00

## 2024-01-01 RX ADMIN — OCTREOTIDE ACETATE 10 MICROGRAM(S)/HR: 200 INJECTION, SOLUTION INTRAVENOUS; SUBCUTANEOUS at 07:40

## 2024-01-01 RX ADMIN — Medication 10 MILLIGRAM(S): at 16:55

## 2024-01-01 RX ADMIN — Medication 5 MILLIGRAM(S): at 11:24

## 2024-01-01 RX ADMIN — PANTOPRAZOLE SODIUM 40 MILLIGRAM(S): 20 TABLET, DELAYED RELEASE ORAL at 06:00

## 2024-01-01 RX ADMIN — HEPARIN SODIUM 5000 UNIT(S): 5000 INJECTION INTRAVENOUS; SUBCUTANEOUS at 17:05

## 2024-01-01 RX ADMIN — CISATRACURIUM BESYLATE 20 MILLIGRAM(S): 2 INJECTION INTRAVENOUS at 14:14

## 2024-01-01 RX ADMIN — Medication 40 MILLIEQUIVALENT(S): at 06:06

## 2024-01-01 RX ADMIN — CHLORHEXIDINE GLUCONATE 1 APPLICATION(S): 213 SOLUTION TOPICAL at 16:30

## 2024-01-01 RX ADMIN — Medication 50 MILLILITER(S): at 07:39

## 2024-01-01 RX ADMIN — Medication 4.32 MICROGRAM(S)/KG/MIN: at 07:50

## 2024-01-01 RX ADMIN — Medication 102 MILLIGRAM(S): at 11:15

## 2024-01-01 RX ADMIN — CEFEPIME 100 MILLIGRAM(S): 1 INJECTION, POWDER, FOR SOLUTION INTRAMUSCULAR; INTRAVENOUS at 11:05

## 2024-01-01 RX ADMIN — OCTREOTIDE ACETATE 10 MICROGRAM(S)/HR: 200 INJECTION, SOLUTION INTRAVENOUS; SUBCUTANEOUS at 10:58

## 2024-01-01 RX ADMIN — Medication 50 MILLIEQUIVALENT(S): at 03:56

## 2024-01-01 RX ADMIN — Medication 1 APPLICATION(S): at 05:31

## 2024-01-01 RX ADMIN — DEXMEDETOMIDINE HYDROCHLORIDE IN 0.9% SODIUM CHLORIDE 6.91 MICROGRAM(S)/KG/HR: 4 INJECTION INTRAVENOUS at 07:41

## 2024-01-01 RX ADMIN — PIPERACILLIN AND TAZOBACTAM 25 GRAM(S): 4; .5 INJECTION, POWDER, LYOPHILIZED, FOR SOLUTION INTRAVENOUS at 05:06

## 2024-01-01 RX ADMIN — Medication 100 MILLIGRAM(S): at 02:32

## 2024-01-01 RX ADMIN — MUPIROCIN 1 APPLICATION(S): 20 OINTMENT TOPICAL at 17:08

## 2024-01-01 RX ADMIN — MUPIROCIN 1 APPLICATION(S): 20 OINTMENT TOPICAL at 05:13

## 2024-01-01 RX ADMIN — VASOPRESSIN 6 UNIT(S)/MIN: 20 INJECTION INTRAVENOUS at 19:40

## 2024-01-01 RX ADMIN — PROPOFOL 27.6 MICROGRAM(S)/KG/MIN: 10 INJECTION, EMULSION INTRAVENOUS at 22:02

## 2024-01-01 RX ADMIN — Medication 100 MILLIGRAM(S): at 02:05

## 2024-01-01 RX ADMIN — Medication 50 MILLILITER(S): at 17:07

## 2024-01-01 RX ADMIN — Medication 1 MILLIGRAM(S): at 12:36

## 2024-01-01 RX ADMIN — POLYETHYLENE GLYCOL 3350 17 GRAM(S): 17 POWDER, FOR SOLUTION ORAL at 05:31

## 2024-01-01 RX ADMIN — VASOPRESSIN 6 UNIT(S)/MIN: 20 INJECTION INTRAVENOUS at 08:00

## 2024-01-01 RX ADMIN — POLYETHYLENE GLYCOL 3350 17 GRAM(S): 17 POWDER, FOR SOLUTION ORAL at 05:13

## 2024-01-01 RX ADMIN — Medication 3 MILLILITER(S): at 15:23

## 2024-01-01 RX ADMIN — Medication 102 MILLIGRAM(S): at 12:32

## 2024-01-01 RX ADMIN — KETAMINE HYDROCHLORIDE 13.8 MG/KG/HR: 100 INJECTION INTRAMUSCULAR; INTRAVENOUS at 18:14

## 2024-01-01 RX ADMIN — Medication 3 MILLILITER(S): at 20:10

## 2024-01-01 RX ADMIN — Medication 260 MILLIGRAM(S): at 12:22

## 2024-01-01 RX ADMIN — Medication 100 MILLIGRAM(S): at 13:27

## 2024-01-01 RX ADMIN — VASOPRESSIN 6 UNIT(S)/MIN: 20 INJECTION INTRAVENOUS at 07:45

## 2024-01-01 RX ADMIN — KETAMINE HYDROCHLORIDE 13.8 MG/KG/HR: 100 INJECTION INTRAMUSCULAR; INTRAVENOUS at 20:41

## 2024-01-01 RX ADMIN — Medication 100 MILLIGRAM(S): at 14:21

## 2024-01-01 RX ADMIN — SODIUM CHLORIDE 50 MILLILITER(S): 9 INJECTION, SOLUTION INTRAVENOUS at 09:00

## 2024-01-01 RX ADMIN — PANTOPRAZOLE SODIUM 40 MILLIGRAM(S): 20 TABLET, DELAYED RELEASE ORAL at 17:43

## 2024-01-01 RX ADMIN — Medication 50 MILLIGRAM(S): at 03:06

## 2024-01-01 RX ADMIN — PANTOPRAZOLE SODIUM 40 MILLIGRAM(S): 20 TABLET, DELAYED RELEASE ORAL at 17:19

## 2024-01-01 RX ADMIN — VASOPRESSIN 6 UNIT(S)/MIN: 20 INJECTION INTRAVENOUS at 10:37

## 2024-01-01 RX ADMIN — OCTREOTIDE ACETATE 10 MICROGRAM(S)/HR: 200 INJECTION, SOLUTION INTRAVENOUS; SUBCUTANEOUS at 07:14

## 2024-01-01 RX ADMIN — PROPOFOL 27.6 MICROGRAM(S)/KG/MIN: 10 INJECTION, EMULSION INTRAVENOUS at 19:40

## 2024-01-01 RX ADMIN — PIPERACILLIN AND TAZOBACTAM 25 GRAM(S): 4; .5 INJECTION, POWDER, LYOPHILIZED, FOR SOLUTION INTRAVENOUS at 05:05

## 2024-01-01 RX ADMIN — CISATRACURIUM BESYLATE 11.1 MICROGRAM(S)/KG/MIN: 2 INJECTION INTRAVENOUS at 17:28

## 2024-01-01 RX ADMIN — Medication 3 MILLILITER(S): at 03:25

## 2024-01-01 RX ADMIN — Medication 50 MILLILITER(S): at 17:11

## 2024-01-01 RX ADMIN — Medication 3 MILLILITER(S): at 21:19

## 2024-01-01 RX ADMIN — POLYETHYLENE GLYCOL 3350 17 GRAM(S): 17 POWDER, FOR SOLUTION ORAL at 17:43

## 2024-01-01 RX ADMIN — PANTOPRAZOLE SODIUM 40 MILLIGRAM(S): 20 TABLET, DELAYED RELEASE ORAL at 17:10

## 2024-01-01 RX ADMIN — FLUCONAZOLE 100 MILLIGRAM(S): 150 TABLET ORAL at 10:06

## 2024-01-01 RX ADMIN — HEPARIN SODIUM 5000 UNIT(S): 5000 INJECTION INTRAVENOUS; SUBCUTANEOUS at 05:24

## 2024-01-01 RX ADMIN — CHLORHEXIDINE GLUCONATE 15 MILLILITER(S): 213 SOLUTION TOPICAL at 04:22

## 2024-01-01 RX ADMIN — Medication 1 MILLIGRAM(S): at 11:21

## 2024-01-01 RX ADMIN — Medication 101 MILLIGRAM(S): at 04:26

## 2024-01-01 RX ADMIN — Medication 1 APPLICATION(S): at 17:10

## 2024-01-01 RX ADMIN — Medication 1000 MILLIGRAM(S): at 16:00

## 2024-01-01 RX ADMIN — KETAMINE HYDROCHLORIDE 23 MG/KG/HR: 100 INJECTION INTRAMUSCULAR; INTRAVENOUS at 12:16

## 2024-01-01 RX ADMIN — Medication 50 MILLIGRAM(S): at 12:09

## 2024-01-01 RX ADMIN — Medication 250 MILLIGRAM(S): at 11:16

## 2024-01-01 RX ADMIN — Medication 40 MILLIEQUIVALENT(S): at 23:11

## 2024-01-01 RX ADMIN — FENTANYL CITRATE 27.6 MICROGRAM(S)/KG/HR: 50 INJECTION INTRAVENOUS at 12:28

## 2024-01-01 RX ADMIN — Medication 3 MILLILITER(S): at 09:49

## 2024-01-01 RX ADMIN — VASOPRESSIN 6 UNIT(S)/MIN: 20 INJECTION INTRAVENOUS at 07:30

## 2024-01-01 RX ADMIN — PROPOFOL 27.6 MICROGRAM(S)/KG/MIN: 10 INJECTION, EMULSION INTRAVENOUS at 10:58

## 2024-01-01 RX ADMIN — MUPIROCIN 1 APPLICATION(S): 20 OINTMENT TOPICAL at 17:10

## 2024-01-01 RX ADMIN — Medication 250 MILLIGRAM(S): at 23:15

## 2024-01-01 RX ADMIN — Medication 1 PACKET(S): at 03:06

## 2024-01-01 RX ADMIN — Medication 3 MILLILITER(S): at 15:52

## 2024-01-01 RX ADMIN — DEXMEDETOMIDINE HYDROCHLORIDE IN 0.9% SODIUM CHLORIDE 6.91 MICROGRAM(S)/KG/HR: 4 INJECTION INTRAVENOUS at 12:15

## 2024-01-01 RX ADMIN — PANTOPRAZOLE SODIUM 40 MILLIGRAM(S): 20 TABLET, DELAYED RELEASE ORAL at 17:52

## 2024-01-01 RX ADMIN — Medication 4.32 MICROGRAM(S)/KG/MIN: at 19:34

## 2024-01-01 RX ADMIN — Medication 40 MILLIGRAM(S): at 16:16

## 2024-01-01 RX ADMIN — FENTANYL CITRATE 5.53 MICROGRAM(S)/KG/HR: 50 INJECTION INTRAVENOUS at 11:25

## 2024-01-01 RX ADMIN — Medication 4.32 MICROGRAM(S)/KG/MIN: at 19:38

## 2024-01-01 RX ADMIN — CHLORHEXIDINE GLUCONATE 15 MILLILITER(S): 213 SOLUTION TOPICAL at 17:14

## 2024-01-01 RX ADMIN — CISATRACURIUM BESYLATE 11.1 MICROGRAM(S)/KG/MIN: 2 INJECTION INTRAVENOUS at 07:58

## 2024-01-01 RX ADMIN — Medication 3 MILLILITER(S): at 03:40

## 2024-01-01 RX ADMIN — CHLORHEXIDINE GLUCONATE 15 MILLILITER(S): 213 SOLUTION TOPICAL at 05:17

## 2024-01-01 RX ADMIN — KETAMINE HYDROCHLORIDE 4.61 MG/KG/HR: 100 INJECTION INTRAMUSCULAR; INTRAVENOUS at 19:30

## 2024-01-01 RX ADMIN — KETAMINE HYDROCHLORIDE 13.8 MG/KG/HR: 100 INJECTION INTRAMUSCULAR; INTRAVENOUS at 19:33

## 2024-01-01 RX ADMIN — CHLORHEXIDINE GLUCONATE 15 MILLILITER(S): 213 SOLUTION TOPICAL at 17:15

## 2024-01-01 RX ADMIN — Medication 4.32 MICROGRAM(S)/KG/MIN: at 19:22

## 2024-01-01 RX ADMIN — PROPOFOL 27.6 MICROGRAM(S)/KG/MIN: 10 INJECTION, EMULSION INTRAVENOUS at 08:00

## 2024-01-01 RX ADMIN — FENTANYL CITRATE 27.6 MICROGRAM(S)/KG/HR: 50 INJECTION INTRAVENOUS at 15:39

## 2024-01-01 RX ADMIN — PIPERACILLIN AND TAZOBACTAM 25 GRAM(S): 4; .5 INJECTION, POWDER, LYOPHILIZED, FOR SOLUTION INTRAVENOUS at 13:01

## 2024-01-01 RX ADMIN — SENNA PLUS 2 TABLET(S): 8.6 TABLET ORAL at 21:05

## 2024-01-01 RX ADMIN — VASOPRESSIN 6 UNIT(S)/MIN: 20 INJECTION INTRAVENOUS at 10:59

## 2024-01-01 RX ADMIN — DEXMEDETOMIDINE HYDROCHLORIDE IN 0.9% SODIUM CHLORIDE 6.91 MICROGRAM(S)/KG/HR: 4 INJECTION INTRAVENOUS at 13:20

## 2024-01-01 RX ADMIN — PANTOPRAZOLE SODIUM 40 MILLIGRAM(S): 20 TABLET, DELAYED RELEASE ORAL at 17:08

## 2024-01-01 RX ADMIN — KETAMINE HYDROCHLORIDE 23 MG/KG/HR: 100 INJECTION INTRAMUSCULAR; INTRAVENOUS at 11:58

## 2024-01-01 RX ADMIN — BUMETANIDE 2 MG/HR: 0.25 INJECTION INTRAMUSCULAR; INTRAVENOUS at 03:14

## 2024-01-01 RX ADMIN — CHLORHEXIDINE GLUCONATE 15 MILLILITER(S): 213 SOLUTION TOPICAL at 17:42

## 2024-01-01 RX ADMIN — PROPOFOL 27.6 MICROGRAM(S)/KG/MIN: 10 INJECTION, EMULSION INTRAVENOUS at 19:29

## 2024-01-01 RX ADMIN — Medication 100 MILLIGRAM(S): at 11:16

## 2024-01-01 RX ADMIN — FENTANYL CITRATE 5.53 MICROGRAM(S)/KG/HR: 50 INJECTION INTRAVENOUS at 07:16

## 2024-01-01 RX ADMIN — Medication 100 MILLIEQUIVALENT(S): at 15:11

## 2024-01-01 RX ADMIN — PIPERACILLIN AND TAZOBACTAM 25 GRAM(S): 4; .5 INJECTION, POWDER, LYOPHILIZED, FOR SOLUTION INTRAVENOUS at 05:13

## 2024-01-01 RX ADMIN — LACTULOSE 30 GRAM(S): 10 SOLUTION ORAL at 05:45

## 2024-01-01 RX ADMIN — CISATRACURIUM BESYLATE 11.1 MICROGRAM(S)/KG/MIN: 2 INJECTION INTRAVENOUS at 19:27

## 2024-01-01 RX ADMIN — KETAMINE HYDROCHLORIDE 13.8 MG/KG/HR: 100 INJECTION INTRAMUSCULAR; INTRAVENOUS at 07:39

## 2024-01-01 RX ADMIN — Medication 3 MILLILITER(S): at 03:41

## 2024-01-01 RX ADMIN — VASOPRESSIN 6 UNIT(S)/MIN: 20 INJECTION INTRAVENOUS at 07:49

## 2024-01-01 RX ADMIN — Medication 40 MILLIEQUIVALENT(S): at 18:09

## 2024-01-01 RX ADMIN — CHLORHEXIDINE GLUCONATE 1 APPLICATION(S): 213 SOLUTION TOPICAL at 12:48

## 2024-01-01 RX ADMIN — Medication 1 APPLICATION(S): at 05:06

## 2024-01-01 RX ADMIN — DEXMEDETOMIDINE HYDROCHLORIDE IN 0.9% SODIUM CHLORIDE 6.91 MICROGRAM(S)/KG/HR: 4 INJECTION INTRAVENOUS at 11:58

## 2024-01-01 RX ADMIN — Medication 3 MILLILITER(S): at 15:39

## 2024-01-01 RX ADMIN — FENTANYL CITRATE 5.53 MICROGRAM(S)/KG/HR: 50 INJECTION INTRAVENOUS at 19:21

## 2024-01-01 RX ADMIN — CEFEPIME 100 MILLIGRAM(S): 1 INJECTION, POWDER, FOR SOLUTION INTRAMUSCULAR; INTRAVENOUS at 18:14

## 2024-01-01 RX ADMIN — Medication 12.1 MICROGRAM(S)/KG/MIN: at 19:29

## 2024-01-01 RX ADMIN — Medication 75 MILLILITER(S): at 10:00

## 2024-01-01 RX ADMIN — POTASSIUM PHOSPHATE, MONOBASIC POTASSIUM PHOSPHATE, DIBASIC 83.33 MILLIMOLE(S): 236; 224 INJECTION, SOLUTION INTRAVENOUS at 21:51

## 2024-01-01 RX ADMIN — PANTOPRAZOLE SODIUM 40 MILLIGRAM(S): 20 TABLET, DELAYED RELEASE ORAL at 05:31

## 2024-01-01 RX ADMIN — KETAMINE HYDROCHLORIDE 13.8 MG/KG/HR: 100 INJECTION INTRAMUSCULAR; INTRAVENOUS at 21:43

## 2024-01-01 RX ADMIN — POLYETHYLENE GLYCOL 3350 17 GRAM(S): 17 POWDER, FOR SOLUTION ORAL at 05:17

## 2024-01-01 RX ADMIN — PROPOFOL 27.6 MICROGRAM(S)/KG/MIN: 10 INJECTION, EMULSION INTRAVENOUS at 07:17

## 2024-01-01 RX ADMIN — VASOPRESSIN 6 UNIT(S)/MIN: 20 INJECTION INTRAVENOUS at 07:33

## 2024-01-01 RX ADMIN — Medication 12.1 MICROGRAM(S)/KG/MIN: at 07:16

## 2024-01-01 RX ADMIN — CEFEPIME 100 MILLIGRAM(S): 1 INJECTION, POWDER, FOR SOLUTION INTRAMUSCULAR; INTRAVENOUS at 02:59

## 2024-01-01 RX ADMIN — Medication 50 MILLILITER(S): at 12:49

## 2024-01-01 RX ADMIN — Medication 3 MILLILITER(S): at 16:10

## 2024-01-01 RX ADMIN — PROPOFOL 27.6 MICROGRAM(S)/KG/MIN: 10 INJECTION, EMULSION INTRAVENOUS at 12:14

## 2024-01-01 RX ADMIN — MUPIROCIN 1 APPLICATION(S): 20 OINTMENT TOPICAL at 05:11

## 2024-01-01 RX ADMIN — PANTOPRAZOLE SODIUM 40 MILLIGRAM(S): 20 TABLET, DELAYED RELEASE ORAL at 05:17

## 2024-01-01 RX ADMIN — Medication 50 MILLILITER(S): at 19:13

## 2024-01-01 RX ADMIN — LACTULOSE 30 GRAM(S): 10 SOLUTION ORAL at 21:04

## 2024-01-01 RX ADMIN — LACTULOSE 30 GRAM(S): 10 SOLUTION ORAL at 05:30

## 2024-01-01 RX ADMIN — Medication 125 MILLILITER(S): at 17:18

## 2024-01-01 RX ADMIN — PROPOFOL 27.6 MICROGRAM(S)/KG/MIN: 10 INJECTION, EMULSION INTRAVENOUS at 16:08

## 2024-01-01 RX ADMIN — PIPERACILLIN AND TAZOBACTAM 25 GRAM(S): 4; .5 INJECTION, POWDER, LYOPHILIZED, FOR SOLUTION INTRAVENOUS at 05:11

## 2024-01-01 RX ADMIN — KETAMINE HYDROCHLORIDE 4.61 MG/KG/HR: 100 INJECTION INTRAMUSCULAR; INTRAVENOUS at 05:16

## 2024-01-01 RX ADMIN — METHYLNALTREXONE BROMIDE 12 MILLIGRAM(S): 12 INJECTION, SOLUTION SUBCUTANEOUS at 19:31

## 2024-01-01 RX ADMIN — Medication 3 MILLILITER(S): at 15:48

## 2024-01-01 RX ADMIN — PANTOPRAZOLE SODIUM 40 MILLIGRAM(S): 20 TABLET, DELAYED RELEASE ORAL at 17:15

## 2024-01-01 RX ADMIN — Medication 200 MILLIGRAM(S): at 09:17

## 2024-01-01 RX ADMIN — CEFEPIME 100 MILLIGRAM(S): 1 INJECTION, POWDER, FOR SOLUTION INTRAMUSCULAR; INTRAVENOUS at 18:09

## 2024-01-01 RX ADMIN — MUPIROCIN 1 APPLICATION(S): 20 OINTMENT TOPICAL at 05:05

## 2024-01-01 RX ADMIN — Medication 3 MILLILITER(S): at 15:36

## 2024-01-01 RX ADMIN — Medication 3 MILLILITER(S): at 03:28

## 2024-01-01 RX ADMIN — VASOPRESSIN 6 UNIT(S)/MIN: 20 INJECTION INTRAVENOUS at 01:59

## 2024-01-01 RX ADMIN — Medication 50 MILLIGRAM(S): at 12:00

## 2024-01-01 RX ADMIN — PANTOPRAZOLE SODIUM 40 MILLIGRAM(S): 20 TABLET, DELAYED RELEASE ORAL at 05:05

## 2024-01-01 RX ADMIN — Medication 100 MILLIGRAM(S): at 12:01

## 2024-01-01 RX ADMIN — PROPOFOL 27.6 MICROGRAM(S)/KG/MIN: 10 INJECTION, EMULSION INTRAVENOUS at 07:46

## 2024-01-01 RX ADMIN — Medication 12.1 MICROGRAM(S)/KG/MIN: at 07:13

## 2024-01-01 RX ADMIN — KETAMINE HYDROCHLORIDE 13.8 MG/KG/HR: 100 INJECTION INTRAMUSCULAR; INTRAVENOUS at 07:36

## 2024-01-01 RX ADMIN — DEXMEDETOMIDINE HYDROCHLORIDE IN 0.9% SODIUM CHLORIDE 6.91 MICROGRAM(S)/KG/HR: 4 INJECTION INTRAVENOUS at 18:13

## 2024-01-01 RX ADMIN — OCTREOTIDE ACETATE 10 MICROGRAM(S)/HR: 200 INJECTION, SOLUTION INTRAVENOUS; SUBCUTANEOUS at 19:30

## 2024-01-01 RX ADMIN — Medication 50 MILLILITER(S): at 20:42

## 2024-01-01 RX ADMIN — FLUCONAZOLE 100 MILLIGRAM(S): 150 TABLET ORAL at 22:11

## 2024-01-01 RX ADMIN — Medication 125 MILLILITER(S): at 05:31

## 2024-01-01 RX ADMIN — Medication 50 MILLIGRAM(S): at 05:30

## 2024-01-01 RX ADMIN — Medication 3 MILLILITER(S): at 03:20

## 2024-01-01 RX ADMIN — Medication 65.31 GRAM(S): at 20:55

## 2024-01-01 RX ADMIN — PANTOPRAZOLE SODIUM 40 MILLIGRAM(S): 20 TABLET, DELAYED RELEASE ORAL at 05:23

## 2024-01-01 RX ADMIN — Medication 12.1 MICROGRAM(S)/KG/MIN: at 19:40

## 2024-01-01 RX ADMIN — CISATRACURIUM BESYLATE 11.1 MICROGRAM(S)/KG/MIN: 2 INJECTION INTRAVENOUS at 07:39

## 2024-01-01 RX ADMIN — Medication 50 MILLIGRAM(S): at 20:07

## 2024-01-01 RX ADMIN — Medication 3 MILLILITER(S): at 08:10

## 2024-01-01 RX ADMIN — Medication 4.32 MICROGRAM(S)/KG/MIN: at 08:00

## 2024-01-01 RX ADMIN — DEXMEDETOMIDINE HYDROCHLORIDE IN 0.9% SODIUM CHLORIDE 6.91 MICROGRAM(S)/KG/HR: 4 INJECTION INTRAVENOUS at 09:38

## 2024-01-01 RX ADMIN — Medication 3 MILLILITER(S): at 22:29

## 2024-01-01 RX ADMIN — LACTULOSE 30 GRAM(S): 10 SOLUTION ORAL at 05:20

## 2024-01-01 RX ADMIN — POLYETHYLENE GLYCOL 3350 17 GRAM(S): 17 POWDER, FOR SOLUTION ORAL at 11:13

## 2024-01-01 RX ADMIN — Medication 12.1 MICROGRAM(S)/KG/MIN: at 02:00

## 2024-01-01 RX ADMIN — CEFEPIME 100 MILLIGRAM(S): 1 INJECTION, POWDER, FOR SOLUTION INTRAMUSCULAR; INTRAVENOUS at 02:32

## 2024-01-01 RX ADMIN — Medication 40 MILLIGRAM(S): at 23:54

## 2024-01-01 RX ADMIN — CHLORHEXIDINE GLUCONATE 15 MILLILITER(S): 213 SOLUTION TOPICAL at 04:28

## 2024-01-01 RX ADMIN — VASOPRESSIN 6 UNIT(S)/MIN: 20 INJECTION INTRAVENOUS at 19:32

## 2024-01-01 RX ADMIN — SENNA PLUS 2 TABLET(S): 8.6 TABLET ORAL at 23:12

## 2024-01-01 RX ADMIN — POLYETHYLENE GLYCOL 3350 17 GRAM(S): 17 POWDER, FOR SOLUTION ORAL at 05:24

## 2024-01-01 RX ADMIN — POLYETHYLENE GLYCOL 3350 17 GRAM(S): 17 POWDER, FOR SOLUTION ORAL at 17:04

## 2024-01-01 RX ADMIN — Medication 3 MILLILITER(S): at 22:18

## 2024-01-01 RX ADMIN — POLYETHYLENE GLYCOL 3350 17 GRAM(S): 17 POWDER, FOR SOLUTION ORAL at 17:44

## 2024-01-01 RX ADMIN — Medication 100 MILLIGRAM(S): at 05:13

## 2024-01-01 RX ADMIN — CHLORHEXIDINE GLUCONATE 15 MILLILITER(S): 213 SOLUTION TOPICAL at 05:24

## 2024-01-01 RX ADMIN — FENTANYL CITRATE 27.6 MICROGRAM(S)/KG/HR: 50 INJECTION INTRAVENOUS at 19:41

## 2024-01-01 RX ADMIN — KETAMINE HYDROCHLORIDE 4.61 MG/KG/HR: 100 INJECTION INTRAMUSCULAR; INTRAVENOUS at 07:43

## 2024-01-01 RX ADMIN — CEFEPIME 100 MILLIGRAM(S): 1 INJECTION, POWDER, FOR SOLUTION INTRAMUSCULAR; INTRAVENOUS at 11:04

## 2024-01-01 RX ADMIN — KETAMINE HYDROCHLORIDE 4.61 MG/KG/HR: 100 INJECTION INTRAMUSCULAR; INTRAVENOUS at 07:16

## 2024-01-01 RX ADMIN — Medication 4.32 MICROGRAM(S)/KG/MIN: at 19:12

## 2024-01-01 RX ADMIN — Medication 3 MILLILITER(S): at 14:25

## 2024-01-01 RX ADMIN — Medication 25 GRAM(S): at 08:47

## 2024-01-01 RX ADMIN — Medication 50 MILLIEQUIVALENT(S): at 02:00

## 2024-01-01 RX ADMIN — PANTOPRAZOLE SODIUM 10 MG/HR: 20 TABLET, DELAYED RELEASE ORAL at 07:38

## 2024-01-01 RX ADMIN — Medication 50 MILLILITER(S): at 17:44

## 2024-01-01 RX ADMIN — Medication 100 MILLILITER(S): at 18:48

## 2024-01-01 RX ADMIN — KETAMINE HYDROCHLORIDE 23 MG/KG/HR: 100 INJECTION INTRAMUSCULAR; INTRAVENOUS at 16:46

## 2024-01-01 RX ADMIN — Medication 50 MILLILITER(S): at 08:56

## 2024-01-01 RX ADMIN — Medication 3 MILLILITER(S): at 22:25

## 2024-01-01 RX ADMIN — Medication 3 MILLILITER(S): at 10:37

## 2024-01-01 RX ADMIN — Medication 50 MILLILITER(S): at 11:53

## 2024-01-01 RX ADMIN — Medication 50 MILLILITER(S): at 11:06

## 2024-01-01 RX ADMIN — POLYETHYLENE GLYCOL 3350 17 GRAM(S): 17 POWDER, FOR SOLUTION ORAL at 05:04

## 2024-01-01 RX ADMIN — Medication 3 MILLILITER(S): at 08:42

## 2024-01-01 RX ADMIN — FENTANYL CITRATE 5.53 MICROGRAM(S)/KG/HR: 50 INJECTION INTRAVENOUS at 08:18

## 2024-01-01 RX ADMIN — Medication 43.54 GRAM(S): at 12:30

## 2024-01-01 RX ADMIN — CHLORHEXIDINE GLUCONATE 1 APPLICATION(S): 213 SOLUTION TOPICAL at 11:51

## 2024-01-01 RX ADMIN — CHLORHEXIDINE GLUCONATE 15 MILLILITER(S): 213 SOLUTION TOPICAL at 05:13

## 2024-01-01 RX ADMIN — Medication 100 MILLIGRAM(S): at 14:22

## 2024-01-01 RX ADMIN — VASOPRESSIN 6 UNIT(S)/MIN: 20 INJECTION INTRAVENOUS at 17:17

## 2024-01-01 RX ADMIN — Medication 12.1 MICROGRAM(S)/KG/MIN: at 07:45

## 2024-01-01 RX ADMIN — PROPOFOL 27.6 MICROGRAM(S)/KG/MIN: 10 INJECTION, EMULSION INTRAVENOUS at 07:49

## 2024-01-01 RX ADMIN — Medication 650 MILLIGRAM(S): at 13:15

## 2024-01-01 RX ADMIN — DEXMEDETOMIDINE HYDROCHLORIDE IN 0.9% SODIUM CHLORIDE 6.91 MICROGRAM(S)/KG/HR: 4 INJECTION INTRAVENOUS at 20:41

## 2024-01-01 RX ADMIN — CEFEPIME 100 MILLIGRAM(S): 1 INJECTION, POWDER, FOR SOLUTION INTRAMUSCULAR; INTRAVENOUS at 18:06

## 2024-01-01 RX ADMIN — LACTULOSE 30 GRAM(S): 10 SOLUTION ORAL at 05:14

## 2024-01-01 RX ADMIN — PROPOFOL 27.6 MICROGRAM(S)/KG/MIN: 10 INJECTION, EMULSION INTRAVENOUS at 10:37

## 2024-01-01 RX ADMIN — Medication 1 TABLET(S): at 11:16

## 2024-01-01 RX ADMIN — PROPOFOL 27.6 MICROGRAM(S)/KG/MIN: 10 INJECTION, EMULSION INTRAVENOUS at 07:38

## 2024-01-01 RX ADMIN — Medication 100 MILLIEQUIVALENT(S): at 14:15

## 2024-01-01 RX ADMIN — Medication 15 MILLILITER(S): at 12:38

## 2024-01-01 RX ADMIN — OCTREOTIDE ACETATE 10 MICROGRAM(S)/HR: 200 INJECTION, SOLUTION INTRAVENOUS; SUBCUTANEOUS at 09:22

## 2024-01-01 RX ADMIN — Medication 50 MILLIEQUIVALENT(S): at 22:11

## 2024-01-01 RX ADMIN — Medication 15 MILLILITER(S): at 12:01

## 2024-01-01 RX ADMIN — Medication 260 MILLIGRAM(S): at 16:17

## 2024-01-01 RX ADMIN — Medication 166.67 MILLIGRAM(S): at 11:25

## 2024-01-01 RX ADMIN — FENTANYL CITRATE 100 MICROGRAM(S): 50 INJECTION INTRAVENOUS at 16:04

## 2024-01-01 RX ADMIN — Medication 100 MILLIGRAM(S): at 13:04

## 2024-01-01 RX ADMIN — Medication 3 MILLILITER(S): at 21:13

## 2024-01-01 RX ADMIN — LACTULOSE 30 GRAM(S): 10 SOLUTION ORAL at 21:38

## 2024-01-01 RX ADMIN — OCTREOTIDE ACETATE 10 MICROGRAM(S)/HR: 200 INJECTION, SOLUTION INTRAVENOUS; SUBCUTANEOUS at 17:28

## 2024-01-01 RX ADMIN — Medication 100 MILLILITER(S): at 19:38

## 2024-01-01 RX ADMIN — MUPIROCIN 1 APPLICATION(S): 20 OINTMENT TOPICAL at 05:06

## 2024-01-01 RX ADMIN — VASOPRESSIN 6 UNIT(S)/MIN: 20 INJECTION INTRAVENOUS at 11:36

## 2024-01-01 RX ADMIN — Medication 50 MILLILITER(S): at 23:35

## 2024-01-01 RX ADMIN — Medication 40 MILLIEQUIVALENT(S): at 21:56

## 2024-01-01 RX ADMIN — Medication 250 MILLIGRAM(S): at 23:11

## 2024-01-01 RX ADMIN — Medication 50 MILLIGRAM(S): at 21:04

## 2024-01-01 RX ADMIN — POLYETHYLENE GLYCOL 3350 17 GRAM(S): 17 POWDER, FOR SOLUTION ORAL at 05:45

## 2024-01-01 RX ADMIN — POTASSIUM PHOSPHATE, MONOBASIC POTASSIUM PHOSPHATE, DIBASIC 83.33 MILLIMOLE(S): 236; 224 INJECTION, SOLUTION INTRAVENOUS at 06:42

## 2024-01-01 RX ADMIN — FENTANYL CITRATE 5.53 MICROGRAM(S)/KG/HR: 50 INJECTION INTRAVENOUS at 11:49

## 2024-01-01 RX ADMIN — Medication 3 MILLILITER(S): at 03:11

## 2024-01-01 RX ADMIN — Medication 3 MILLILITER(S): at 09:36

## 2024-01-01 RX ADMIN — Medication 50 MILLILITER(S): at 19:37

## 2024-01-01 RX ADMIN — HEPARIN SODIUM 5000 UNIT(S): 5000 INJECTION INTRAVENOUS; SUBCUTANEOUS at 17:18

## 2024-01-01 RX ADMIN — DEXMEDETOMIDINE HYDROCHLORIDE IN 0.9% SODIUM CHLORIDE 6.91 MICROGRAM(S)/KG/HR: 4 INJECTION INTRAVENOUS at 07:48

## 2024-01-01 RX ADMIN — Medication 1 APPLICATION(S): at 18:05

## 2024-01-01 RX ADMIN — Medication 40 MILLIEQUIVALENT(S): at 10:56

## 2024-01-01 RX ADMIN — CEFEPIME 100 MILLIGRAM(S): 1 INJECTION, POWDER, FOR SOLUTION INTRAMUSCULAR; INTRAVENOUS at 11:58

## 2024-01-01 RX ADMIN — Medication 50 MILLILITER(S): at 05:22

## 2024-01-01 RX ADMIN — Medication 1 PACKET(S): at 22:47

## 2024-01-01 RX ADMIN — Medication 50 MILLIEQUIVALENT(S): at 08:26

## 2024-01-01 RX ADMIN — KETAMINE HYDROCHLORIDE 4.61 MG/KG/HR: 100 INJECTION INTRAMUSCULAR; INTRAVENOUS at 19:32

## 2024-01-01 RX ADMIN — AZITHROMYCIN 500 MILLIGRAM(S): 500 TABLET, FILM COATED ORAL at 17:19

## 2024-01-01 RX ADMIN — Medication 650 MILLIGRAM(S): at 17:00

## 2024-01-01 RX ADMIN — MIDAZOLAM HYDROCHLORIDE 2 MILLIGRAM(S): 1 INJECTION, SOLUTION INTRAMUSCULAR; INTRAVENOUS at 23:25

## 2024-01-01 RX ADMIN — PROPOFOL 27.6 MICROGRAM(S)/KG/MIN: 10 INJECTION, EMULSION INTRAVENOUS at 07:11

## 2024-01-01 RX ADMIN — Medication 40 MILLIEQUIVALENT(S): at 01:41

## 2024-01-01 RX ADMIN — Medication 1 PACKET(S): at 13:04

## 2024-01-01 RX ADMIN — Medication 100 MILLIGRAM(S): at 06:00

## 2024-01-01 RX ADMIN — Medication 50 MILLIGRAM(S): at 11:36

## 2024-01-01 RX ADMIN — LACTULOSE 30 GRAM(S): 10 SOLUTION ORAL at 13:22

## 2024-01-01 RX ADMIN — Medication 3 MILLILITER(S): at 22:41

## 2024-01-01 RX ADMIN — Medication 1 MILLIGRAM(S): at 11:52

## 2024-01-01 RX ADMIN — FENTANYL CITRATE 27.6 MICROGRAM(S)/KG/HR: 50 INJECTION INTRAVENOUS at 07:18

## 2024-01-01 RX ADMIN — Medication 400 MILLIGRAM(S): at 18:54

## 2024-01-01 RX ADMIN — CHLORHEXIDINE GLUCONATE 15 MILLILITER(S): 213 SOLUTION TOPICAL at 17:43

## 2024-01-01 RX ADMIN — POTASSIUM PHOSPHATE, MONOBASIC POTASSIUM PHOSPHATE, DIBASIC 83.33 MILLIMOLE(S): 236; 224 INJECTION, SOLUTION INTRAVENOUS at 11:37

## 2024-04-27 NOTE — PROCEDURE NOTE - NSBRONCHPROCDETAILS_GEN_A_CORE_FT
Findings:  Bronchoscope inserted through ETT. ETT noted to be in good position. Airway evaluation revealed erythematous and edematous airway. There were thick bloody secretions throughout which were therapeutically suctioned. There was no active site of bleeding. A BAL was completed x 3 with 30 cc's of NS instilled into the RML lateral segment and recovered. On recovery the sample was sero-bilious and did not clear with subsequent lavage. Bronchoscope then withdrawn from ETT.

## 2024-04-27 NOTE — H&P ADULT - NSHPPHYSICALEXAM_GEN_ALL_CORE
PHYSICAL EXAM:  GENERAL: Sitting comfortable in bed, in no acute distress  HENMT: Atraumatic, moist mucous membranes, no oropharyngeal exudates or vesicles, uvula is midline EYES: Clear bilaterally, PERRL, EOMs intact b/l  HEART: RRR, S1/S2, no murmur/gallops/rubs  RESPIRATORY: Clear to auscultation bilaterally, no wheezes/rhonchi/rales  ABDOMEN: +BS, soft, nontender, nondistended  EXTREMITIES: No lower extremity edema, +2 radial pulses b/l  NEURO:  A&Ox4, no focal motor deficits or sensory deficits   Heme/LYMPH: No ecchymosis or bruising, no anterior/posterior cervical or supraclavicular LAD  SKIN:  Skin normal color for race, warm, dry and intact. No evidence of rash. PHYSICAL EXAM:  GENERAL: intubated, sedated, jaundiced  HENMT: Atraumatic, moist mucous membranes, no oropharyngeal exudates or vesicles, uvula is midline EYES: Clear bilaterally, PERRL,   HEART: RRR, S1/S2, no murmur/gallops/rubs  RESPIRATORY: intubated, diffuse inspiratory wheezes b/l  ABDOMEN: +BS, soft, nontender, nondistended  EXTREMITIES: 2+ lower extremity edema, R>L, +2 radial pulses b/l  NEURO:  intubated, sedated  Heme/LYMPH: scattered ecchymosis  SKIN:  jaundiced

## 2024-04-27 NOTE — H&P ADULT - NSICDXFAMILYHX_GEN_ALL_CORE_FT
FAMILY HISTORY:  Father  Still living? Unknown  FH: type 2 diabetes, Age at diagnosis: Age Unknown    Mother  Still living? Unknown  FH: lung cancer, Age at diagnosis: Age Unknown    Grandparent  Still living? Unknown  FH: type 2 diabetes, Age at diagnosis: Age Unknown

## 2024-04-27 NOTE — PATIENT PROFILE ADULT - FALL HARM RISK - HARM RISK INTERVENTIONS
Assistance with ambulation/Assistance OOB with selected safe patient handling equipment/Communicate Risk of Fall with Harm to all staff/Reinforce activity limits and safety measures with patient and family/Review medications for side effects contributing to fall risk/Sit up slowly, dangle for a short time, stand at bedside before walking/Tailored Fall Risk Interventions/Toileting schedule using arm’s reach rule for commode and bathroom/Visual Cue: Yellow wristband and red socks/Bed in lowest position, wheels locked, appropriate side rails in place/Call bell, personal items and telephone in reach/Instruct patient to call for assistance before getting out of bed or chair/Non-slip footwear when patient is out of bed/Gates to call system/Physically safe environment - no spills, clutter or unnecessary equipment/Purposeful Proactive Rounding/Room/bathroom lighting operational, light cord in reach

## 2024-04-27 NOTE — PROGRESS NOTE ADULT - ATTENDING COMMENTS
Patient is a 44 yo M w/ EOTH misuse disorder, PVD, PAD, PTSD, restless leg syndrome who is transferred from [place] and admitted to MICU for ARDS, acute anemia, GIB, and acute liver failure.     #ARDS  #Acute hypoxemic respiratory failure  #Acute blood loss anemia  #Acute liver failure  #GIB  #LUBA  #Shock  #Seizure - Noted seizure like activity 4/27 AM  - c/w vasopressors to maintain MAP > 65  - c/w mechanical ventilatory support, lung protective ventilation as tolerated. TV decreased to 420 (IBW 68). Plateau 28, DP 16  - Monitor ABGs  - c/w sedation and paralysis for now  - Will order 24h vEEG  - NPO for now, Trend CBC q6h. Trend Coags and Fibrinogen  - Check Hapto  - f/u GI/Hep recs. Will order NAC and octreotide gtt  - Monitor BMP and UOP, diuresis as needed to maintain euvolemia  - Check urine studies  - Plan for bronchoscopy today  - RUQ/Abdominal US  - c/w empiric Vanc/Zosyn for now, f/u cultures, urine strep/leg, RVP, MRSA PCR    Domenico Krueger MD  Pulmonary & Critical Care

## 2024-04-27 NOTE — PROGRESS NOTE ADULT - SUBJECTIVE AND OBJECTIVE BOX
INTERVAL HPI/OVERNIGHT EVENTS:    SUBJECTIVE: Patient seen and examined at bedside.     OBJECTIVE:    VITAL SIGNS:  ICU Vital Signs Last 24 Hrs  T(C): 36.8 (2024 08:00), Max: 37.2 (2024 04:00)  T(F): 98.3 (2024 08:00), Max: 98.9 (2024 04:00)  HR: 88 (2024 08:00) (85 - 94)  BP: 150/60 (2024 00:00) (150/60 - 150/60)  BP(mean): --  ABP: 133/68 (2024 08:00) (120/60 - 134/64)  ABP(mean): 90 (2024 08:00) (80 - 90)  RR: 24 (2024 08:00) (21 - 24)  SpO2: 98% (2024 08:00) (96% - 100%)    O2 Parameters below as of 2024 08:00  Patient On (Oxygen Delivery Method): ventilator    O2 Concentration (%): 80      Mode: AC/ CMV (Assist Control/ Continuous Mandatory Ventilation), RR (machine): 24, TV (machine): 450, FiO2: 80, PEEP: 12, ITime: 0.86, MAP: 19, PIP: 34    04-26 @ 07:01  -  - @ 07:00  --------------------------------------------------------  IN: 620.5 mL / OUT: 550 mL / NET: 70.5 mL      CAPILLARY BLOOD GLUCOSE          PHYSICAL EXAM:    General: NAD  HEENT: NC/AT; PERRL, clear conjunctiva  Neck: supple  Respiratory: CTA b/l  Cardiovascular: +S1/S2; RRR  Abdomen: soft, NT/ND; +BS x4  Extremities: WWP, 2+ peripheral pulses b/l; no LE edema  Skin: normal color and turgor; no rash  Neurological:    MEDICATIONS:  MEDICATIONS  (STANDING):  albumin human 25% IVPB 100 milliLiter(s) IV Intermittent every 6 hours  albuterol/ipratropium for Nebulization. 3 milliLiter(s) Nebulizer once  chlorhexidine 0.12% Liquid 15 milliLiter(s) Oral Mucosa every 12 hours  cisatracurium Infusion 1 MICROgram(s)/kG/Min (5.53 mL/Hr) IV Continuous <Continuous>  fentaNYL   Infusion. 3 MICROgram(s)/kG/Hr (27.6 mL/Hr) IV Continuous <Continuous>  norepinephrine Infusion 0.14 MICROgram(s)/kG/Min (12.1 mL/Hr) IV Continuous <Continuous>  pantoprazole  Injectable 40 milliGRAM(s) IV Push two times a day  piperacillin/tazobactam IVPB.. 3.375 Gram(s) IV Intermittent every 12 hours  propofol Infusion 50 MICROgram(s)/kG/Min (27.6 mL/Hr) IV Continuous <Continuous>  vancomycin  IVPB 1250 milliGRAM(s) IV Intermittent once  vasopressin Infusion 0.04 Unit(s)/Min (6 mL/Hr) IV Continuous <Continuous>    MEDICATIONS  (PRN):  albuterol/ipratropium for Nebulization 3 milliLiter(s) Nebulizer every 6 hours PRN Shortness of Breath and/or Wheezing      ALLERGIES:  Allergies    Allergy Status Unknown    Intolerances        LABS:                        7.2    7.13  )-----------( 67       ( 2024 06:45 )             21.9     Hemoglobin: 7.2 g/dL ( @ 06:45)  Hemoglobin: 6.8 g/dL ( @ 00:30)    CBC Full  -  ( 2024 06:45 )  WBC Count : 7.13 K/uL  RBC Count : 2.27 M/uL  Hemoglobin : 7.2 g/dL  Hematocrit : 21.9 %  Platelet Count - Automated : 67 K/uL  Mean Cell Volume : 96.5 fL  Mean Cell Hemoglobin : 31.7 pg  Mean Cell Hemoglobin Concentration : 32.9 gm/dL  Auto Neutrophil # : 4.81 K/uL  Auto Lymphocyte # : 1.38 K/uL  Auto Monocyte # : 0.81 K/uL  Auto Eosinophil # : 0.06 K/uL  Auto Basophil # : 0.02 K/uL  Auto Neutrophil % : 67.4 %  Auto Lymphocyte % : 19.4 %  Auto Monocyte % : 11.4 %  Auto Eosinophil % : 0.8 %  Auto Basophil % : 0.3 %        136  |  103  |  46<H>  ----------------------------<  139<H>  4.3   |  19<L>  |  1.86<H>    Ca    8.0<L>      2024 00:30  Phos  5.1       Mg     1.40         TPro  6.3  /  Alb  2.6<L>  /  TBili  14.9<H>  /  DBili  11.4<H>  /  AST  135<H>  /  ALT  32  /  AlkPhos  43      Creatinine Trend: 1.86<--  LIVER FUNCTIONS - ( 2024 00:30 )  Alb: 2.6 g/dL / Pro: 6.3 g/dL / ALK PHOS: 43 U/L / ALT: 32 U/L / AST: 135 U/L / GGT: x           PT/INR - ( 2024 06:45 )   PT: 27.0 sec;   INR: 2.48 ratio         PTT - ( 2024 06:45 )  PTT:31.5 sec    hs Troponin:    ABG - ( 2024 06:45 )  pH, Arterial: 7.35  pH, Blood: x     /  pCO2: 42    /  pO2: 75    / HCO3: 23    / Base Excess: -2.2  /  SaO2: 97.0                06:45 - ABG - pH: 7.35  |  pCO2: 42    |  pO2: 75    | Lactate:       | BE: -2.2   00:30 - ABG - pH: 7.33  |  pCO2: 39    |  pO2: 135   | Lactate:       | BE: -4.9       Urinalysis Basic - ( 2024 00:31 )    Color: Dark Yellow / Appearance: Turbid / S.030 / pH: x  Gluc: x / Ketone: Negative mg/dL  / Bili: Large / Urobili: 1.0 mg/dL   Blood: x / Protein: 30 mg/dL / Nitrite: Positive   Leuk Esterase: Small / RBC: 629 /HPF / WBC 12 /HPF   Sq Epi: x / Non Sq Epi: 6 /HPF / Bacteria: Negative /HPF      CSF:                      EKG:   MICROBIOLOGY:    IMAGING:      Labs, imaging, EKG personally reviewed    RADIOLOGY & ADDITIONAL TESTS: Reviewed. INTERVAL HPI/OVERNIGHT EVENTS: Patient admitted overnight.     SUBJECTIVE: Patient seen and examined at bedside. Patient with seizure like activity, given ativan 4mg IV x1     OBJECTIVE:    VITAL SIGNS:  ICU Vital Signs Last 24 Hrs  T(C): 36.8 (2024 08:00), Max: 37.2 (2024 04:00)  T(F): 98.3 (2024 08:00), Max: 98.9 (2024 04:00)  HR: 88 (:00) (85 - 94)  BP: 150/60 (2024 00:00) (150/60 - 150/60)  BP(mean): --  ABP: 133/68 (2024 08:00) (120/60 - 134/64)  ABP(mean): 90 (2024 08:00) (80 - 90)  RR: 24 (:00) (21 - 24)  SpO2: 98% (:00) (96% - 100%)    O2 Parameters below as of 2024 08:00  Patient On (Oxygen Delivery Method): ventilator    O2 Concentration (%): 80      Mode: AC/ CMV (Assist Control/ Continuous Mandatory Ventilation), RR (machine): 24, TV (machine): 450, FiO2: 80, PEEP: 12, ITime: 0.86, MAP: 19, PIP: 34    04-26 @ 07:01  -  - @ 07:00  --------------------------------------------------------  IN: 620.5 mL / OUT: 550 mL / NET: 70.5 mL      CAPILLARY BLOOD GLUCOSE          PHYSICAL EXAM:    PHYSICAL EXAM:  GENERAL: intubated, sedated, jaundiced  HENMT: Atraumatic, moist mucous membranes, no oropharyngeal exudates or vesicles, uvula is midline EYES: Clear bilaterally, PERRL,   HEART: RRR, S1/S2, no murmur/gallops/rubs  RESPIRATORY: intubated, diffuse inspiratory wheezes b/l  ABDOMEN: +BS, soft, nontender, nondistended  EXTREMITIES: 2+ lower extremity edema, R>L, +2 radial pulses b/l  NEURO:  intubated, sedated  Heme/LYMPH: scattered ecchymosis  SKIN:  jaundiced    MEDICATIONS:  MEDICATIONS  (STANDING):  albumin human 25% IVPB 100 milliLiter(s) IV Intermittent every 6 hours  albuterol/ipratropium for Nebulization. 3 milliLiter(s) Nebulizer once  chlorhexidine 0.12% Liquid 15 milliLiter(s) Oral Mucosa every 12 hours  cisatracurium Infusion 1 MICROgram(s)/kG/Min (5.53 mL/Hr) IV Continuous <Continuous>  fentaNYL   Infusion. 3 MICROgram(s)/kG/Hr (27.6 mL/Hr) IV Continuous <Continuous>  norepinephrine Infusion 0.14 MICROgram(s)/kG/Min (12.1 mL/Hr) IV Continuous <Continuous>  pantoprazole  Injectable 40 milliGRAM(s) IV Push two times a day  piperacillin/tazobactam IVPB.. 3.375 Gram(s) IV Intermittent every 12 hours  propofol Infusion 50 MICROgram(s)/kG/Min (27.6 mL/Hr) IV Continuous <Continuous>  vancomycin  IVPB 1250 milliGRAM(s) IV Intermittent once  vasopressin Infusion 0.04 Unit(s)/Min (6 mL/Hr) IV Continuous <Continuous>    MEDICATIONS  (PRN):  albuterol/ipratropium for Nebulization 3 milliLiter(s) Nebulizer every 6 hours PRN Shortness of Breath and/or Wheezing      ALLERGIES:  Allergies    Allergy Status Unknown    Intolerances        LABS:                        7.2    7.13  )-----------( 67       ( 2024 06:45 )             21.9     Hemoglobin: 7.2 g/dL ( @ 06:45)  Hemoglobin: 6.8 g/dL ( @ 00:30)    CBC Full  -  ( 2024 06:45 )  WBC Count : 7.13 K/uL  RBC Count : 2.27 M/uL  Hemoglobin : 7.2 g/dL  Hematocrit : 21.9 %  Platelet Count - Automated : 67 K/uL  Mean Cell Volume : 96.5 fL  Mean Cell Hemoglobin : 31.7 pg  Mean Cell Hemoglobin Concentration : 32.9 gm/dL  Auto Neutrophil # : 4.81 K/uL  Auto Lymphocyte # : 1.38 K/uL  Auto Monocyte # : 0.81 K/uL  Auto Eosinophil # : 0.06 K/uL  Auto Basophil # : 0.02 K/uL  Auto Neutrophil % : 67.4 %  Auto Lymphocyte % : 19.4 %  Auto Monocyte % : 11.4 %  Auto Eosinophil % : 0.8 %  Auto Basophil % : 0.3 %        136  |  103  |  46<H>  ----------------------------<  139<H>  4.3   |  19<L>  |  1.86<H>    Ca    8.0<L>      2024 00:30  Phos  5.1       Mg     1.40         TPro  6.3  /  Alb  2.6<L>  /  TBili  14.9<H>  /  DBili  11.4<H>  /  AST  135<H>  /  ALT  32  /  AlkPhos  43      Creatinine Trend: 1.86<--  LIVER FUNCTIONS - ( 2024 00:30 )  Alb: 2.6 g/dL / Pro: 6.3 g/dL / ALK PHOS: 43 U/L / ALT: 32 U/L / AST: 135 U/L / GGT: x           PT/INR - ( 2024 06:45 )   PT: 27.0 sec;   INR: 2.48 ratio         PTT - ( 2024 06:45 )  PTT:31.5 sec    hs Troponin:    ABG - ( 2024 06:45 )  pH, Arterial: 7.35  pH, Blood: x     /  pCO2: 42    /  pO2: 75    / HCO3: 23    / Base Excess: -2.2  /  SaO2: 97.0                06:45 - ABG - pH: 7.35  |  pCO2: 42    |  pO2: 75    | Lactate:       | BE: -2.2   00:30 - ABG - pH: 7.33  |  pCO2: 39    |  pO2: 135   | Lactate:       | BE: -4.9       Urinalysis Basic - ( 2024 00:31 )    Color: Dark Yellow / Appearance: Turbid / S.030 / pH: x  Gluc: x / Ketone: Negative mg/dL  / Bili: Large / Urobili: 1.0 mg/dL   Blood: x / Protein: 30 mg/dL / Nitrite: Positive   Leuk Esterase: Small / RBC: 629 /HPF / WBC 12 /HPF   Sq Epi: x / Non Sq Epi: 6 /HPF / Bacteria: Negative /HPF    Labs, imaging, EKG personally reviewed    RADIOLOGY & ADDITIONAL TESTS: Reviewed.

## 2024-04-27 NOTE — H&P ADULT - NSHPSOCIALHISTORY_GEN_ALL_CORE
alcohol use disorder, heavy drinking 3-4 years ago (currently light drinking, last drink Saturday 4/20/24) alcohol use disorder, heavy drinking 3-4 years ago (currently light drinking, last drink Saturday 4/20/24). ex-girlfriend found rum  lives alone  unemployed, lost job last year because he got arrested  no tobacco use  prior cocaine use per father, unsure if current

## 2024-04-27 NOTE — H&P ADULT - ASSESSMENT
43M with hx of alcohol use disorder, restless leg syndrome, PVD, anxiety/PTSD ( service), PAD, taking adderall at home, presented as a transfer from [facility] ED for acute decompensated liver and respiratory failure and possible ECMO eval.  43M with hx of alcohol use disorder, restless leg syndrome, PVD, anxiety/PTSD ( service), PAD, taking adderall at home, presented as a transfer from [facility] ED for acute decompensated liver and respiratory failure and possible ECMO eval.         Neuro      Respiratory        Cardiac      Gastrointestinal   #Cirrhosis     Endo  43M with hx of alcohol use disorder, restless leg syndrome, PVD, anxiety/PTSD ( service), PAD, taking adderall at home, presented as a transfer from [facility] ED for acute decompensated liver and respiratory failure and possible ECMO eval.     # NEURO  # encephalopathy    # CARDS  # shock        # PULM  # ARDS    # RENAL  #     # GI  # cirrhosis    # hematemesis    # ID  # sepsis    # ENDO  - no acute issues    # HEME/ONC  # bicytopenia    # DERM  # jaundice    DVT: hold for now given anemia/hematemesis  FENGI: NPO for now given GIB  LDA: a-line,   GTT: prop, fent, nimbex, levo, vaso  GOC: Full code for now,    43M with hx of alcohol use disorder, restless leg syndrome, PVD, anxiety/PTSD ( service), PAD, taking adderall at home, presented as a transfer from [facility] ED for acute decompensated liver and respiratory failure and possible ECMO eval.     # NEURO  # encephalopathy  -     # CARDS  # shock        # PULM  # ARDS    # RENAL  #     # GI  # cirrhosis    # hematemesis    # ID  # sepsis    # ENDO  - no acute issues    # HEME/ONC  # bicytopenia    # DERM  # jaundice    DVT: hold for now given anemia/hematemesis  FENGI: NPO for now given GIB  LDA: a-line,   GTT: prop, fent, nimbex, levo, vaso  GOC: Full code for now,    43M with hx of alcohol use disorder, restless leg syndrome, PVD, anxiety/PTSD ( service), PAD, taking adderall at home, presented as a transfer from [facility] ED for acute decompensated liver and respiratory failure and possible ECMO eval.     # NEURO  # encephalopathy  - likely metabolic vs hepatic encephalopathy  - currently sedated and paralyzed, wean per ICU protocol, maintain RAAS -4 to -5 to maintain vent synchrony    # CARDS  # shock  - likely vasoplegia iso sepsis and sedation   - wean vasopressors per ICU protocol  - maintain MAP 65-70  - abx as below      # PULM  # ARDS  - P/F reported to be 70 at [facility], reflecting severe ARDS  - etiology unclear, possibly 2/2 pneumonia that was worsened by pneumonitis from hematemesis. patient reportedly hypoxemic prior to episode of hematemesis, ?septic emboli/endocarditis iso sclerotic/calcified aortic valve on POCUS  - maintain lung protective ventilation, wean per ICU protocol  - paralyzed at [facility], can consider proning if without improvement  - maintain net even to -1L   - CD with images from [facility] obtained, will bring to radiology to upload onto PACS    # RENAL  # LUBA  - sCr on admit 1.86, baseline unclear  - likely hepatorenal syndrome, vs ATN from shock  - Renal protective measures: Please renally dose all medications; Avoid nephrotoxic medications (NSAIDS, IV contrast); Avoid ACEi and ARBs in the setting of LUBA; Maintain MAP >65;   - indwelling geronimo replaced on admit 4/27 from OSH,   - monitor strict I&O  - albumin x2 days    # GI  # hematemesis  - EGD at OSH noted blood clots in oropharynx, no obvious bleeding source, normal esophagus  - no epistaxis noted on exam  - protonix 40 mg IVP BID  - strict NPO    # acute liver failure  - send eval for liver injury: serum tox (tylenol level), hepatitis labs, unlikely shock liver. can consider RUQ ultrasound   - Last EtOH use: reportedly 4/20/24 per documentation from [facility]   - MELD-Na: 33    - Variceal status: normal esophagus on EGD at OSH    # ID  # [id number]+  - source unclear, possible pneumonia (minimal secretions), vs ?endocarditis vs UTI  - await urine and blood cultures  - vanc by level and zosyn (4/27 - )  - preview images from OSH, can consider repeat CT CAP    # ENDO  - no acute issues    # HEME/ONC  # bicytopenia  # elevated INR  - likely iso liver failure vs acute bleed at OSH  - s/p 4 u prbc and 2 u FFP at OSH. received 1 u prbc, 1 u plt, and 1 u FFP on 4/27  - transfuse for hb <7, plt <50 for bleeding    # DERM  # jaundice  - likely iso elevated bilirubin, acute liver failure    DVT: hold for now given anemia/hematemesis, SCD  FENGI: NPO for now given GIB  LDA: left radial a-line, right IJ  GTT: prop, fent, nimbex, levo, vaso  GOC: Full code for now, family: father Jutsice and has a brother

## 2024-04-27 NOTE — H&P ADULT - ATTENDING COMMENTS
43 M history EtOH use, PVD, anxiety presenting as transfer from [facility] ICU for acute hypoxic respiratory failure secondary to pneumonia with developed ARDS PF ratio 70.  Incidentally noted to have elevated bilirubin as well as GI bleed requiring multiple blood products.  Transferred to [facility] MICU for acute lung injury evaluation possible VV ECMO.    On exam patient is jaundiced appearing, intubated, sedated/paralyzed on full vent support requiring vasopressin and norepinephrine to maintain MAP 70.  Extremities warm and perfused distally, lungs clear to auscultation bilaterally, diminished at bases, abdomen soft nondistended, early anasarca    Laboratory studies significant for anemia with hemoglobin 6.8, thrombocytopenia 71, elevated coags, metabolic acidosis with renal insufficiency, hyperbilirubinemia of 14.9.    43 M with acute hypoxic respiratory failure developed ARDS secondary to multifocal pneumonia complicated by GI bleed  Sedation and paralysis propofol and Nimbex infusion with RASS goal -4 to -5 in setting of lung injury  Fentanyl for analgesia  Daily thiamine, multivitamin folic acid  Check ammonia  Acute hypoxic respiratory failure likely secondary to pneumonia versus aspiration  ARDS improved with paralysis, send repeat ABG  Lung protective ventilation strategy  Wean PEEP from 15 with target driving pressure less than 15  If lung function continues to improve can hold paralysis in a.m.  Obtain chest x-ray for tube and line placement after transport  Continue vasopressin and norepinephrine, MAP goal greater than 65, can use albumin for volume resuscitation judicious  Obtain formal TTE and EKG, bedside echo shows preserved LV function, thickening noted at aortic valve, VTI 28, no pericardial effusion  N.p.o., PPI twice daily, place OG tube  Status post EGD reportedly without signs of active bleeding  Right upper quadrant ultrasound to eval elevated direct bili  Transfuse PRBC goal hemoglobin greater than 7  Replace platelets and FFP, persistently high INR likely secondary to poor liver function  Check ionized calcium and fibrinogen level, maintain normothermia  Lin catheter strict I's and O's  Replace magnesium  Pan culture, continue broad abx coverage  SCDs DVT prophylaxis, hold chemical  Full code  Dispo MICU    EMPERATRIZ Jimenez  MICU Attending

## 2024-04-27 NOTE — PROGRESS NOTE ADULT - ASSESSMENT
43M with hx of alcohol use disorder, restless leg syndrome, PVD, anxiety/PTSD ( service), PAD, taking adderall at home, presented as a transfer from [facility] ED for acute decompensated liver and respiratory failure and possible ECMO eval.     # NEURO  # encephalopathy  - likely metabolic vs hepatic encephalopathy (ammonia 94)  - currently sedated and paralyzed (on fent, prop, nimbex) wean per ICU protocol, maintain RAAS -4 to -5 to maintain vent synchrony    #Seizures   - no documented history of seizures, appeared to be having seizure like activity during rounds this AM   - 24 hour veeg  - will give 4IV ativan if needed for seizure like activity     # CARDS  # shock  - likely vasoplegia iso sepsis and sedation, currently on levo and vaso   - wean vasopressors per ICU protocol  - maintain MAP 65-70  - abx as below      # PULM  # ARDS  - P/F reported to be 70 at [facility], reflecting severe ARDS  - etiology unclear, possibly 2/2 pneumonia that was worsened by pneumonitis from hematemesis. Patient reportedly hypoxemic prior to episode of hematemesis, ?septic emboli/endocarditis iso sclerotic/calcified aortic valve on POCUS  - maintain lung protective ventilation, wean per ICU protocol  - paralyzed at [facility], can consider proning if without improvement  - CD with images from [facility] obtained, will bring to radiology to upload onto PACS  - formal TTE ordered   - duonebs q6    # RENAL  # LUBA  - sCr on admit 1.86, baseline unclear  - likely hepatorenal syndrome, vs ATN from shock  - Renal protective measures: Please renally dose all medications; Avoid nephrotoxic medications (NSAIDS, IV contrast); Avoid ACEi and ARBs in the setting of LUBA; Maintain MAP >65;   - indwelling geronimo replaced on admit 4/27 from OSH,   - monitor strict I&O  - albumin x2 days  - urine studies     # GI  # hematemesis  - EGD at OSH noted blood clots in oropharynx, no obvious bleeding source, normal esophagus  - no epistaxis noted on exam  - protonix 40 mg IVP BID  - strict NPO  - cbc q6    # acute liver failure  - send eval for liver injury: serum tox (tylenol level), hepatitis labs, unlikely shock liver.    - Last EtOH use: reportedly 4/20/24 per documentation from [facility]   - MELD-Na: 33    - Variceal status: normal esophagus on EGD at OSH  - right upper quadrant ultrasound     # ID  # SIRS+  - source unclear, possible pneumonia (minimal secretions), vs ?endocarditis vs UTI  - await urine and blood cultures  - vanc by level and zosyn (4/27 - )  - preview images from OSH, can consider repeat CT CAP    # ENDO  - no acute issues    # HEME/ONC  # bicytopenia  # elevated INR  - likely iso liver failure vs acute bleed at OSH  - s/p 4 u prbc and 2 u FFP at OSH. received 1 u prbc, 1 u plt, and 1 u FFP on 4/27  - transfuse for hb <7, plt <50 for bleeding    # DERM  # jaundice  - likely iso elevated bilirubin, acute liver failure    DVT: hold for now given anemia/hematemesis, SCD  FENGI: NPO for now given GIB  LDA: left radial a-line, right IJ  GTT: prop, fent, nimbex, levo, vaso  GOC: Full code for now, family: father Justice and has a brother   43M with hx of alcohol use disorder, restless leg syndrome, PVD, anxiety/PTSD ( service), PAD, taking adderall at home, presented as a transfer from [facility] ED for acute decompensated liver and respiratory failure and possible ECMO eval.     # NEURO  # encephalopathy  - likely metabolic vs hepatic encephalopathy (ammonia 94)  - currently sedated and paralyzed (on fent, prop, nimbex) wean per ICU protocol, maintain RAAS -4 to -5 to maintain vent synchrony    #Seizures   - no documented history of seizures, appeared to be having seizure like activity during rounds this AM   - 24 hour veeg  - will give 4IV ativan if needed for seizure like activity     # CARDS  # shock  - likely vasoplegia iso sepsis and sedation, currently on levo and vaso   - wean vasopressors per ICU protocol  - maintain MAP 65-70  - abx as below  - formal TTE ordered given calcified aortic valve seen on POCUS       # PULM  # ARDS  - P/F reported to be 70 at [facility], reflecting severe ARDS  - etiology unclear, possibly 2/2 pneumonia that was worsened by pneumonitis from hematemesis. Patient reportedly hypoxemic prior to episode of hematemesis, ?septic emboli/endocarditis iso sclerotic/calcified aortic valve on POCUS  - maintain lung protective ventilation, wean per ICU protocol  - paralyzed at [facility], can consider proning if without improvement  - CD with images from [facility] obtained, will bring to radiology to upload onto PACS  - duonebs q6    # RENAL  # LUBA  - sCr on admit 1.86, baseline unclear  - likely hepatorenal syndrome, vs ATN from shock  - Renal protective measures: Please renally dose all medications; Avoid nephrotoxic medications (NSAIDS, IV contrast); Avoid ACEi and ARBs in the setting of LUBA; Maintain MAP >65;   - indwelling geronimo replaced on admit 4/27 from OSH,   - monitor strict I&O  - albumin x2 days  - urine studies     # GI  # hematemesis  - EGD at OSH noted blood clots in oropharynx, no obvious bleeding source, normal esophagus  - no epistaxis noted on exam  - protonix 40 mg IVP BID  - strict NPO  - cbc q6    # acute liver failure  - send eval for liver injury: serum tox (tylenol level), hepatitis labs, unlikely shock liver.    - Last EtOH use: reportedly 4/20/24 per documentation from [facility]   - MELD-Na: 33    - Variceal status: normal esophagus on EGD at OSH  - right upper quadrant ultrasound   - GI consult     # ID  # SIRS+  - source unclear, possible pneumonia (minimal secretions), vs ?endocarditis vs UTI  - await urine and blood cultures. full RVP and MRSA swab pending   - vanc by level and zosyn (4/27 - )  - preview images from OSH, can consider repeat CT CAP    # ENDO  - no acute issues    # HEME/ONC  # bicytopenia  # elevated INR  - likely iso liver failure vs acute bleed at OSH  - s/p 4 u prbc and 2 u FFP at OSH. received 1 u prbc, 1 u plt, and 1 u FFP on 4/27  - transfuse for hb <7, plt <50 for bleeding    # DERM  # jaundice  - likely iso elevated bilirubin, acute liver failure    DVT: hold for now given anemia/hematemesis, SCD  FENGI: NPO for now given GIB, will place OG tube   LDA: left radial a-line, right IJ  GTT: prop, fent, nimbex, levo, vaso  GOC: Full code for now, family: father Justice and has a brother

## 2024-04-27 NOTE — H&P ADULT - NSHPLABSRESULTS_GEN_ALL_CORE
LABS: When present labs, imaging, and ECG were personally reviewed                             6.8    8.40  )-----------( 71       ( 27 Apr 2024 00:30 )               21.0       04-27    136  |  103  |  46<H>  ----------------------------<  139<H>  4.3   |  19<L>  |  1.86<H>    Ca    8.0<L>      27 Apr 2024 00:30  Phos  5.1     04-27  Mg     1.40     04-27    TPro  6.3  /  Alb  2.6<L>  /  TBili  14.9<H>  /  DBili  x   /  AST  135<H>  /  ALT  32  /  AlkPhos  43  04-27       LIVER FUNCTIONS - ( 27 Apr 2024 00:30 )  Alb: 2.6 g/dL / Pro: 6.3 g/dL / ALK PHOS: 43 U/L / ALT: 32 U/L / AST: 135 U/L / GGT: x                ABG - ( 27 Apr 2024 00:30 )  pH, Arterial: 7.33  pH, Blood: x     /  pCO2: 39    /  pO2: 135   / HCO3: 21    / Base Excess: -4.9  /  SaO2: 99.8          Urinalysis Basic - ( 27 Apr 2024 00:30 )    Color: x / Appearance: x / SG: x / pH: x  Gluc: 139 mg/dL / Ketone: x  / Bili: x / Urobili: x   Blood: x / Protein: x / Nitrite: x   Leuk Esterase: x / RBC: x / WBC x   Sq Epi: x / Non Sq Epi: x / Bacteria: x        PT/INR - ( 27 Apr 2024 00:30 )   PT: 31.8 sec;   INR: 2.93 ratio         PTT - ( 27 Apr 2024 00:30 )  PTT:42.3 sec    Lactate Trend  04-27 @ 00:30 Lactate:3.3     :

## 2024-04-27 NOTE — CONSULT NOTE ADULT - ASSESSMENT
Mr. Burns is a 43 year old male with alcohol use disorder, restless leg syndrome, PVD, anxiety/PTSD who arrived to [facility] as a transfer from [facility] for respiratory failure and ECMO evaluation in the context of liver failure. Patient presented to OSH on 4/25 for chest pain with a hospital course c/b hematemesis, respiratory failure and hepatic dysfunction.     #Hepatic dysfunction  #Acute liver failure vs ACLF   #Jaundice  #Coagulopathy   #ARDS  #Hematemesis  #Normocytic anemia  #Thrombocytopenia  Patient with jaundice, coagulopathy and encephalopathy i/s/o alcohol use disorder. Chronicity of underlying liver disease is unclear at this time. DDx for shelter is broad and includes alcohol use, Tylenol toxicity, viral illness, autoimmune hepatitis. MELD 3.0 score is 34. Patient underwent EGD at OSH for hematemesis with findings of PHG, normal esophagus and hematin in the gastric fundus without actively bleeding source. Gastric varices are not entirely excluded.  Tylenol level and salicylate level undetectable on 4/27.   Blood Type: O    Recommendations:  - Continue empiric antibiotics  - Complete infectious work-up including cultures  - Start NAC per protocol below given liver failure and Tylenol use  - Octreotide gtt @ 50 mcg/hr due to PHG   - Monitor for recurrent bleeding   - Obtain OSH EGD report. May consider repeat EGD pending clinical course.   - Keep NPO for now  - Obtain abdominal US + US doppler and perform diagnostic paracentesis if suitable ascites are noted   - please send viral etiologies: HAV IgG, HBVcAb, HBVsAb (quantitative), HBVsAg, HBV PCR, HCV IgG, HCV PCR, HEV IgM, HEV IgG, HSV 1/2 IgM, HSV 1/2 IgG; EBV serologies, EBV PCR, CMV IgM, CMV IgG, CMV PCR, Parvovirus B19 serologies, parvovirus B19 PCR  - send following for autoimmune etiologies: ERLIN, anti-smooth muscle antibody, anti-mitochondrial antibody, immunoglobulin subset  - send ceruloplasmin, ferritin, iron panel, AFP, CEA, CA 19-9  - send UTOx  - send TSH, RPR, quantiferon gold, B12  - HIV (rapid)  - TTE   - please check the following labs q6h: CBC, CMP, INR, fibrinogen, VBG, Mg, Phos  - Maintain active T&S and transfuse as needed  - elevate bed to 45 degrees   - if evidence of seizure activity, recommend vEEG, use of ativan or phosphenytoin PRN  - Hemodynamic and respiratory support per ICU team    Initiate N-acetylcysteine as a continuous infusion in the following steps    1) 150mg/kg in 250 mL 5% dextrose over 30 mins  2) 50mg/kg in 500 mL 5% dextrose solution over 4 hours  3) 100mg/kg in 1000 mL 5% dextrose solution over 16 hours  4) 100mg/kg in 1000 mL 5% dextrose solution over days 2-5      Recommendations preliminary until signed by attending.     Jurgen Ross MD  Gastroenterology/Hepatology Fellow  Available via Microsoft Teams  Pager: [phone]    On Weekdays after 5 PM to 8AM and Weekends/Holidays (All day)  For non-urgent consults, please email [email or web address] or [email or web address]  For urgent consults, please contact on call GI team.  See Amion schedule ([facility]), [employer] system - [id number] ([facility]), or call hospital  ([facility]) Mr. Burns is a 43 year old male with alcohol use disorder, restless leg syndrome, PVD, anxiety/PTSD who arrived to [facility] as a transfer from [facility] for respiratory failure and ECMO evaluation in the context of liver failure. Patient presented to OSH on 4/25 for chest pain with a hospital course c/b hematemesis, respiratory failure and hepatic dysfunction.     #Hepatic dysfunction  #Acute liver failure vs ACLF   #Jaundice  #Coagulopathy   #ARDS  #Hematemesis  #Normocytic anemia  #Thrombocytopenia  Patient with jaundice, coagulopathy and encephalopathy i/s/o alcohol use disorder. Chronicity of underlying liver disease is unclear at this time. DDx for skilled nursing is broad and includes alcohol use, Tylenol toxicity, viral illness, autoimmune hepatitis. MELD 3.0 score is 34. Patient underwent EGD at OSH for hematemesis with findings of PHG, normal esophagus and hematin in the gastric fundus without actively bleeding source. Gastric varices are not entirely excluded.  Tylenol level and salicylate level undetectable on 4/27.   MDF >80. Not a candidate for prednisone for alcoholic hepatitis due to bleeding and critical illness.   Blood Type: O    Recommendations:  - Continue empiric antibiotics  - Complete infectious work-up including cultures  - Start NAC per protocol below given liver failure and Tylenol use  - Octreotide gtt @ 50 mcg/hr due to PHG   - Monitor for recurrent bleeding   - Obtain OSH EGD report. May consider repeat EGD pending clinical course.   - Keep NPO for now  - Obtain abdominal US + US doppler and perform diagnostic paracentesis if suitable ascites are noted   - please send viral etiologies: HAV IgG, HBVcAb, HBVsAb (quantitative), HBVsAg, HBV PCR, HCV IgG, HCV PCR, HEV IgM, HEV IgG, HSV 1/2 IgM, HSV 1/2 IgG; EBV serologies, EBV PCR, CMV IgM, CMV IgG, CMV PCR, Parvovirus B19 serologies, parvovirus B19 PCR  - send following for autoimmune etiologies: ERLIN, anti-smooth muscle antibody, anti-mitochondrial antibody, immunoglobulin subset  - send ceruloplasmin, ferritin, iron panel, AFP, CEA, CA 19-9  - send UTOx  - send TSH, RPR, quantiferon gold, B12  - HIV (rapid)  - TTE   - please check the following labs q6h: CBC, CMP, INR, fibrinogen, VBG, Mg, Phos  - Maintain active T&S and transfuse as needed  - elevate bed to 45 degrees   - if evidence of seizure activity, recommend vEEG, use of ativan or phosphenytoin PRN  - Hemodynamic and respiratory support per ICU team    Initiate N-acetylcysteine as a continuous infusion in the following steps    1) 150mg/kg in 250 mL 5% dextrose over 30 mins  2) 50mg/kg in 500 mL 5% dextrose solution over 4 hours  3) 100mg/kg in 1000 mL 5% dextrose solution over 16 hours  4) 100mg/kg in 1000 mL 5% dextrose solution over days 2-5      Recommendations preliminary until signed by attending.     Jurgen Ross MD  Gastroenterology/Hepatology Fellow  Available via Microsoft Teams  Pager: [phone]    On Weekdays after 5 PM to 8AM and Weekends/Holidays (All day)  For non-urgent consults, please email [email or web address] or [email or web address]  For urgent consults, please contact on call GI team.  See Amion schedule ([facility]), [employer] system - [id number] ([facility]), or call hospital  ([facility]) Mr. Burns is a 43 year old male with alcohol use disorder, restless leg syndrome, PVD, anxiety/PTSD who arrived to [facility] as a transfer from [facility] for respiratory failure and ECMO evaluation in the context of liver failure. Patient presented to OSH on 4/25 for chest pain with a hospital course c/b hematemesis, respiratory failure and hepatic dysfunction.     #Hepatic dysfunction  #Alcohol-related liver disease, suspect acute on chronic liver failure (ACLF)  #Jaundice  #Coagulopathy   #ARDS  #Hematemesis  #Normocytic anemia  #Thrombocytopenia  Patient with jaundice, coagulopathy and encephalopathy i/s/o alcohol use disorder. Chronicity of underlying liver disease is unclear at this time. Suspect ACLF due to alcohol use. DDx includes Tylenol toxicity, viral illness, autoimmune hepatitis. MELD 3.0 score is 34. Patient underwent EGD at OSH for hematemesis with findings of PHG, normal esophagus and hematin in the gastric fundus without actively bleeding source. Esophageal varices were not reported. Gastric varices are not entirely excluded.  Tylenol level and salicylate level were undetectable on 4/27.  MDF >80, however not a candidate for prednisone therapy for alcoholic hepatitis due to recent bleeding and critical illness. Patient remains intubated, ventilated and sedated on two vasopressors.   Blood Type: O    Recommendations:  - Continue empiric antibiotics  - Complete infectious work-up including cultures  - Start NAC per protocol below given liver failure and recent Tylenol use  - Octreotide gtt @ 50 mcg/hr due to PHG   - Monitor for recurrent bleeding   - Obtain OSH EGD report. May consider repeat EGD pending clinical course.   - Okay for OG tube placement and initiation of trickle feeds  - Obtain abdominal US + US doppler and perform diagnostic paracentesis if suitable ascites are noted   - please send viral etiologies: HAV IgG, HBVcAb, HBVsAb (quantitative), HBVsAg, HBV PCR, HCV IgG, HCV PCR, HEV IgM, HEV IgG, HSV 1/2 IgM, HSV 1/2 IgG; EBV serologies, EBV PCR, CMV IgM, CMV IgG, CMV PCR, Parvovirus B19 serologies, parvovirus B19 PCR  - send following for autoimmune etiologies: ERLIN, anti-smooth muscle antibody, anti-mitochondrial antibody, immunoglobulin subset  - send ceruloplasmin, ferritin, iron panel, AFP, CEA, CA 19-9  - send UTOx  - send TSH, RPR, quantiferon gold, B12  - HIV (rapid)  - TTE   - Trend CBC, CMP, INR, fibrinogen, VBG, Mg, Phos  - Maintain active T&S and transfuse as needed  - elevate bed to 45 degrees   - if evidence of seizure activity, recommend vEEG, use of ativan or phosphenytoin PRN  - Hemodynamic and respiratory support per ICU team    Initiate N-acetylcysteine as a continuous infusion in the following steps    1) 150mg/kg in 250 mL 5% dextrose over 30 mins  2) 50mg/kg in 500 mL 5% dextrose solution over 4 hours  3) 100mg/kg in 1000 mL 5% dextrose solution over 16 hours  4) 100mg/kg in 1000 mL 5% dextrose solution over days 2-5      Jurgen Ross MD  Gastroenterology/Hepatology Fellow  Available via [employer] Teams  Pager: [phone]    On Weekdays after 5 PM to 8AM and Weekends/Holidays (All day)  For non-urgent consults, please email [email or web address] or [email or web address]  For urgent consults, please contact on call GI team.  See Amion schedule ([facility]), [employer] paging system - [phone] ([facility]), or call hospital  ([facility])

## 2024-04-27 NOTE — CHART NOTE - NSCHARTNOTEFT_GEN_A_CORE
: Dr. Tnoy Pham    INDICATION: Acute respiratory failure    PROCEDURE:  [x] LIMITED ECHO  [x] LIMITED CHEST  [ ] LIMITED RETROPERITONEAL  [ ] LIMITED ABDOMINAL  [x] LIMITED DVT  [ ] NEEDLE GUIDANCE VASCULAR  [ ] NEEDLE GUIDANCE THORACENTESIS  [ ] NEEDLE GUIDANCE PARACENTESIS  [ ] NEEDLE GUIDANCE PERICARDIOCENTESIS  [ ] OTHER    FINDINGS:  scattered b lines bilaterally with trace pleff. Air bronchograms noted in R posterior lung field.   LVSF grossly normal  calcified and thick AV node  Femoral veins with normal compressibility b/l    INTERPRETATION:  b-lines bilaterally, consider additional diuresis after albumin resuscitation  no femoral DVT  grossly normal cardiac function

## 2024-04-27 NOTE — H&P ADULT - HISTORY OF PRESENT ILLNESS
43M with hx of alcohol use disorder, restless leg syndrome, PVD, anxiety/PTSD ( service), PAD, taking adderall at home, presented as a transfer from [facility] ED for acute decompensated liver and respiratory failure and possible ECMO eval. Patient intubated on arrival, history obtained on chart review from paperwork received from [facility]. He initially presented on 4/25 for chest pain and was worried that he was having a heart attack, which subsided by the time he was evaluated at [facility]. He also fell on the day of presentation, but was unable to provide specific details on how he fell, stating that he just fell. Unclear hx of jaundice, but was noticed by someone from his [employer] who also suggested patient undergo med eval. ROS also notable for hemorrhoids and occaional BRBPR. Found to have Hb 4.9, received 2 units pRBC and 2 u FFP    43M with hx of alcohol use disorder, restless leg syndrome, PVD, anxiety/PTSD ( service), PAD, taking adderall at home, presented as a transfer from [facility] ED for acute decompensated liver and respiratory failure and possible ECMO eval. Patient intubated on arrival, history obtained on chart review from paperwork received from [facility]. He initially presented on 4/25 for chest pain and was worried that he was having a heart attack, which subsided by the time he was evaluated at [facility]. He also fell on the day of presentation, but was unable to provide specific details on how he fell, stating that he just fell. Unclear hx of jaundice, but was noticed by someone from his [employer] who also suggested patient undergo med eval. ROS also notable for hemorrhoids and occasional BRBPR. Found to have Hb 4.9, received 2 units pRBC and 2 u FFP    43M with hx of alcohol use disorder, restless leg syndrome, PVD, anxiety/PTSD ( service), PAD, taking adderall at home, presented as a transfer from [facility] ED for acute decompensated liver and respiratory failure and possible ECMO eval. Patient intubated on arrival, history obtained on chart review from paperwork received from [facility]. He initially presented on 4/25 for chest pain and was worried that he was having a heart attack, which subsided by the time he was evaluated at [facility]. He also fell on the day of presentation, but was unable to provide specific details on how he fell, stating that he just fell. Unclear hx of jaundice, but was noticed by someone from his [employer] who also suggested patient undergo med eval. ROS also notable for hemorrhoids and occasional BRBPR. Found to have Hb 4.9, received 4 units pRBC and 2 u FFP. He was initially placed on HFNC with worsening hypoxemia with plan for intubation. However had an episode of hematemesis and was immediately intubated for airway protection. Noted to have active bleeding during intubation. GI performed EGD with moderate amount of blood clots in oropharynx, no obvious bleeding source, normal esophagus, portal hypertensive gastropathy, hematin in gastric fundus. On laboratory evaluation, patient noted to have p/f 70 and high peak pressures with c/f ARDS. Patient was transferred to [facility] for further eval and management.    43M with hx of alcohol use disorder, restless leg syndrome, PVD, anxiety/PTSD ( service), PAD, taking adderall at home, presented as a transfer from [facility] ED for acute decompensated liver and respiratory failure and possible ECMO eval. Patient intubated on arrival, history obtained on chart review from paperwork received from [facility]. He initially presented on 4/25 for chest pain and was worried that he was having a heart attack, which subsided by the time he was evaluated at [facility]. He also fell on the day of presentation, but was unable to provide specific details on how he fell, stating that he just fell. Unclear hx of jaundice, but was noticed by someone from his [employer] who also suggested patient undergo med eval. ROS also notable for hemorrhoids and occasional BRBPR. Found to have Hb 4.9, received 4 units pRBC and 2 u FFP. He was initially placed on HFNC with worsening hypoxemia with plan for intubation. However had an episode of hematemesis and was immediately intubated for airway protection. Noted to have active bleeding during intubation. GI performed EGD with moderate amount of blood clots in oropharynx, no obvious bleeding source, normal esophagus, portal hypertensive gastropathy, hematin in gastric fundus. On laboratory evaluation, patient noted to have p/f 70 and high peak pressures with c/f ARDS. Patient was transferred to [facility] for further eval and management.     On d/w patient's father, patient was suffering from worsening leg pain from restless leg and worsening SYDNI. Was taking tylenol, unsure how much.

## 2024-04-28 NOTE — PROGRESS NOTE ADULT - SUBJECTIVE AND OBJECTIVE BOX
INTERVAL HPI/OVERNIGHT EVENTS:  No acute overnight events.     SUBJECTIVE: Patient seen and examined at bedside. Denies fevers, chills, CP, SOB, Abdominal pain, N/V, Constipation, and Diarrhea.    12 Point ROS negative with the exception of the above    OBJECTIVE:    VITAL SIGNS:  ICU Vital Signs Last 24 Hrs  T(C): 37.7 (28 Apr 2024 04:00), Max: 37.8 (28 Apr 2024 00:00)  T(F): 99.8 (28 Apr 2024 04:00), Max: 100 (28 Apr 2024 00:00)  HR: 117 (28 Apr 2024 06:13) (86 - 127)  BP: --  BP(mean): --  ABP: 121/57 (28 Apr 2024 06:00) (81/44 - 133/68)  ABP(mean): 80 (28 Apr 2024 06:00) (57 - 90)  RR: 28 (28 Apr 2024 06:00) (24 - 28)  SpO2: 92% (28 Apr 2024 06:13) (91% - 98%)    O2 Parameters below as of 28 Apr 2024 04:00  Patient On (Oxygen Delivery Method): ventilator    O2 Concentration (%): 70      Mode: AC/ CMV (Assist Control/ Continuous Mandatory Ventilation), RR (machine): 28, TV (machine): 420, FiO2: 70, PEEP: 12, ITime: 0.81, MAP: 19, PIP: 31    04-26 @ 07:01 - 04-27 @ 07:00  --------------------------------------------------------  IN: 620.5 mL / OUT: 550 mL / NET: 70.5 mL    04-27 @ 07:01  -  04-28 @ 06:35  --------------------------------------------------------  IN: 5494.3 mL / OUT: 1275 mL / NET: 4219.3 mL      CAPILLARY BLOOD GLUCOSE      POCT Blood Glucose.: 154 mg/dL (28 Apr 2024 05:26)      PHYSICAL EXAM:    General: NAD  HEENT: NC/AT; PERRL, clear conjunctiva  Neck: supple  Respiratory: CTA b/l  Cardiovascular: +S1/S2; RRR  Abdomen: soft, NT/ND; +BS x4  Extremities: WWP, 2+ peripheral pulses b/l; no LE edema  Skin: normal color and turgor; no rash  Neurological:    MEDICATIONS:  MEDICATIONS  (STANDING):  albumin human 25% IVPB 100 milliLiter(s) IV Intermittent every 6 hours  albuterol/ipratropium for Nebulization 3 milliLiter(s) Nebulizer every 6 hours  albuterol/ipratropium for Nebulization. 3 milliLiter(s) Nebulizer once  azithromycin  IVPB 500 milliGRAM(s) IV Intermittent every 24 hours  azithromycin  IVPB      buMETAnide Infusion 0.4 mG/Hr (2 mL/Hr) IV Continuous <Continuous>  chlorhexidine 0.12% Liquid 15 milliLiter(s) Oral Mucosa every 12 hours  chlorhexidine 2% Cloths 1 Application(s) Topical daily  cisatracurium Infusion 2 MICROgram(s)/kG/Min (11.1 mL/Hr) IV Continuous <Continuous>  fentaNYL   Infusion. 3 MICROgram(s)/kG/Hr (27.6 mL/Hr) IV Continuous <Continuous>  mupirocin 2% Ointment 1 Application(s) Both Nostrils every 12 hours  norepinephrine Infusion 0.14 MICROgram(s)/kG/Min (12.1 mL/Hr) IV Continuous <Continuous>  octreotide  Infusion 50 MICROgram(s)/Hr (10 mL/Hr) IV Continuous <Continuous>  pantoprazole  Injectable 40 milliGRAM(s) IV Push two times a day  piperacillin/tazobactam IVPB.. 3.375 Gram(s) IV Intermittent every 12 hours  propofol Infusion 49.946 MICROgram(s)/kG/Min (27.6 mL/Hr) IV Continuous <Continuous>  vasopressin Infusion 0.04 Unit(s)/Min (6 mL/Hr) IV Continuous <Continuous>    MEDICATIONS  (PRN):      ALLERGIES:  Allergies    Allergy Status Unknown    Intolerances        LABS:                        7.2    7.67  )-----------( 55       ( 28 Apr 2024 03:50 )             21.9     04-28    133<L>  |  98  |  60<H>  ----------------------------<  121<H>  3.9   |  18<L>  |  1.97<H>    Ca    8.2<L>      28 Apr 2024 03:50  Phos  4.1     04-28  Mg     1.90     04-28    TPro  6.5  /  Alb  3.5  /  TBili  17.3<H>  /  DBili  x   /  AST  141<H>  /  ALT  42<H>  /  AlkPhos  31<L>  04-28    PT/INR - ( 28 Apr 2024 03:50 )   PT: 33.1 sec;   INR: 3.03 ratio         PTT - ( 28 Apr 2024 03:50 )  PTT:35.2 sec  Urinalysis Basic - ( 28 Apr 2024 03:50 )    Color: x / Appearance: x / SG: x / pH: x  Gluc: 121 mg/dL / Ketone: x  / Bili: x / Urobili: x   Blood: x / Protein: x / Nitrite: x   Leuk Esterase: x / RBC: x / WBC x   Sq Epi: x / Non Sq Epi: x / Bacteria: x        RADIOLOGY & ADDITIONAL TESTS: Reviewed. INTERVAL HPI/OVERNIGHT EVENTS:  No acute overnight events. on Bumex gtt, initially with output 60-75 cc/hr, this am decreased to 30cc/hr.     SUBJECTIVE: Patient seen and examined at bedside. Intubated, sedated, and paralyzed, unable to obtain ROS>     OBJECTIVE:    VITAL SIGNS:  ICU Vital Signs Last 24 Hrs  T(C): 37.7 (28 Apr 2024 04:00), Max: 37.8 (28 Apr 2024 00:00)  T(F): 99.8 (28 Apr 2024 04:00), Max: 100 (28 Apr 2024 00:00)  HR: 117 (28 Apr 2024 06:13) (86 - 127)  BP: --  BP(mean): --  ABP: 121/57 (28 Apr 2024 06:00) (81/44 - 133/68)  ABP(mean): 80 (28 Apr 2024 06:00) (57 - 90)  RR: 28 (28 Apr 2024 06:00) (24 - 28)  SpO2: 92% (28 Apr 2024 06:13) (91% - 98%)    O2 Parameters below as of 28 Apr 2024 04:00  Patient On (Oxygen Delivery Method): ventilator    O2 Concentration (%): 70      Mode: AC/ CMV (Assist Control/ Continuous Mandatory Ventilation), RR (machine): 28, TV (machine): 420, FiO2: 70, PEEP: 12, ITime: 0.81, MAP: 19, PIP: 31    04-26 @ 07:01 - 04-27 @ 07:00  --------------------------------------------------------  IN: 620.5 mL / OUT: 550 mL / NET: 70.5 mL    04-27 @ 07:01  -  04-28 @ 06:35  --------------------------------------------------------  IN: 5494.3 mL / OUT: 1275 mL / NET: 4219.3 mL      CAPILLARY BLOOD GLUCOSE      POCT Blood Glucose.: 154 mg/dL (28 Apr 2024 05:26)      PHYSICAL EXAM:  GENERAL: intubated, sedated, jaundiced  HENMT: Atraumatic, moist mucous membranes, no oropharyngeal exudates or vesicles, uvula is midline   EYES: PERRLA  HEART: RRR, S1/S2, no murmur/gallops/rubs  RESPIRATORY: intubated, diffuse inspiratory wheezes b/l  ABDOMEN: +BS, soft, nontender, nondistended  EXTREMITIES: 2+ lower extremity edema R>L, +2 radial pulses b/l  NEURO:  intubated, sedated  Heme/LYMPH: scattered ecchymosis  SKIN:  jaundiced    MEDICATIONS:  MEDICATIONS  (STANDING):  albumin human 25% IVPB 100 milliLiter(s) IV Intermittent every 6 hours  albuterol/ipratropium for Nebulization 3 milliLiter(s) Nebulizer every 6 hours  albuterol/ipratropium for Nebulization. 3 milliLiter(s) Nebulizer once  azithromycin  IVPB 500 milliGRAM(s) IV Intermittent every 24 hours  azithromycin  IVPB      buMETAnide Infusion 0.4 mG/Hr (2 mL/Hr) IV Continuous <Continuous>  chlorhexidine 0.12% Liquid 15 milliLiter(s) Oral Mucosa every 12 hours  chlorhexidine 2% Cloths 1 Application(s) Topical daily  cisatracurium Infusion 2 MICROgram(s)/kG/Min (11.1 mL/Hr) IV Continuous <Continuous>  fentaNYL   Infusion. 3 MICROgram(s)/kG/Hr (27.6 mL/Hr) IV Continuous <Continuous>  mupirocin 2% Ointment 1 Application(s) Both Nostrils every 12 hours  norepinephrine Infusion 0.14 MICROgram(s)/kG/Min (12.1 mL/Hr) IV Continuous <Continuous>  octreotide  Infusion 50 MICROgram(s)/Hr (10 mL/Hr) IV Continuous <Continuous>  pantoprazole  Injectable 40 milliGRAM(s) IV Push two times a day  piperacillin/tazobactam IVPB.. 3.375 Gram(s) IV Intermittent every 12 hours  propofol Infusion 49.946 MICROgram(s)/kG/Min (27.6 mL/Hr) IV Continuous <Continuous>  vasopressin Infusion 0.04 Unit(s)/Min (6 mL/Hr) IV Continuous <Continuous>    MEDICATIONS  (PRN):      ALLERGIES:  Allergies    Allergy Status Unknown    Intolerances        LABS:                        7.2    7.67  )-----------( 55       ( 28 Apr 2024 03:50 )             21.9     04-28    133<L>  |  98  |  60<H>  ----------------------------<  121<H>  3.9   |  18<L>  |  1.97<H>    Ca    8.2<L>      28 Apr 2024 03:50  Phos  4.1     04-28  Mg     1.90     04-28    TPro  6.5  /  Alb  3.5  /  TBili  17.3<H>  /  DBili  x   /  AST  141<H>  /  ALT  42<H>  /  AlkPhos  31<L>  04-28    PT/INR - ( 28 Apr 2024 03:50 )   PT: 33.1 sec;   INR: 3.03 ratio         PTT - ( 28 Apr 2024 03:50 )  PTT:35.2 sec  Urinalysis Basic - ( 28 Apr 2024 03:50 )    Color: x / Appearance: x / SG: x / pH: x  Gluc: 121 mg/dL / Ketone: x  / Bili: x / Urobili: x   Blood: x / Protein: x / Nitrite: x   Leuk Esterase: x / RBC: x / WBC x   Sq Epi: x / Non Sq Epi: x / Bacteria: x        RADIOLOGY & ADDITIONAL TESTS: Reviewed.

## 2024-04-28 NOTE — PROGRESS NOTE ADULT - ASSESSMENT
43M with hx of alcohol use disorder, restless leg syndrome, PVD, anxiety/PTSD ( service), PAD, taking adderall at home, presented as a transfer from [facility] ED for acute decompensated liver and respiratory failure and possible ECMO eval.     # NEURO  # encephalopathy  - likely metabolic vs hepatic encephalopathy (ammonia 94)  - currently sedated and paralyzed (on fent, prop, nimbex) wean per ICU protocol, maintain RAAS -4 to -5 to maintain vent synchrony    #Seizures   - no documented history of seizures, appeared to be having seizure like activity during rounds this AM   - 24 hour veeg  - will give 4IV ativan if needed for seizure like activity     # CARDS  # shock  - likely vasoplegia iso sepsis and sedation, currently on levo and vaso   - wean vasopressors per ICU protocol  - maintain MAP 65-70  - abx as below  - formal TTE ordered given calcified aortic valve seen on POCUS       # PULM  # ARDS  - P/F reported to be 70 at [facility], reflecting severe ARDS  - etiology unclear, possibly 2/2 pneumonia that was worsened by pneumonitis from hematemesis. Patient reportedly hypoxemic prior to episode of hematemesis, ?septic emboli/endocarditis iso sclerotic/calcified aortic valve on POCUS  - maintain lung protective ventilation, wean per ICU protocol  - paralyzed at [facility], can consider proning if without improvement  - CD with images from [facility] obtained, will bring to radiology to upload onto PACS  - duonebs q6    # RENAL  # LUBA  - sCr on admit 1.86, baseline unclear  - likely hepatorenal syndrome, vs ATN from shock  - Renal protective measures: Please renally dose all medications; Avoid nephrotoxic medications (NSAIDS, IV contrast); Avoid ACEi and ARBs in the setting of LUBA; Maintain MAP >65;   - indwelling geronimo replaced on admit 4/27 from OSH,   - monitor strict I&O  - albumin x2 days  - urine studies     # GI  # hematemesis  - EGD at OSH noted blood clots in oropharynx, no obvious bleeding source, normal esophagus  - no epistaxis noted on exam  - protonix 40 mg IVP BID  - strict NPO  - cbc q6    # acute liver failure  - send eval for liver injury: serum tox (tylenol level), hepatitis labs, unlikely shock liver.    - Last EtOH use: reportedly 4/20/24 per documentation from [facility]   - MELD-Na: 33    - Variceal status: normal esophagus on EGD at OSH  - right upper quadrant ultrasound   - GI consult     # ID  # SIRS+  - source unclear, possible pneumonia (minimal secretions), vs ?endocarditis vs UTI  - await urine and blood cultures. full RVP and MRSA swab pending   - vanc by level and zosyn (4/27 - )  - preview images from OSH, can consider repeat CT CAP    # ENDO  - no acute issues    # HEME/ONC  # bicytopenia  # elevated INR  - likely iso liver failure vs acute bleed at OSH  - s/p 4 u prbc and 2 u FFP at OSH. received 1 u prbc, 1 u plt, and 1 u FFP on 4/27  - transfuse for hb <7, plt <50 for bleeding    # DERM  # jaundice  - likely iso elevated bilirubin, acute liver failure    DVT: hold for now given anemia/hematemesis, SCD  FENGI: NPO for now given GIB, will place OG tube   LDA: left radial a-line, right IJ  GTT: prop, fent, nimbex, levo, vaso  GOC: Full code for now, family: father Justice and has a brother   43M with hx of alcohol use disorder, restless leg syndrome, PVD, anxiety/PTSD ( service), PAD, taking adderall at home, presented as a transfer from [facility] ED for acute decompensated liver and respiratory failure and possible ECMO eval.     NEURO  # encephalopathy  - likely metabolic vs hepatic encephalopathy (ammonia 94)  - currently sedated and paralyzed (on fent, prop, nimbex) wean per ICU protocol, maintain RAAS -4 to -5 to maintain vent synchrony    #Seizures   - no documented history of seizures, appeared to be having seizure like activity during rounds 4/27  - 24 hour veeg, prelim no acute seizure like activity  - will give 4IV ativan if needed for seizure like activity     CARDS  # shock  - likely vasoplegia iso sepsis and sedation, currently on levo and vaso   - wean vasopressors per ICU protocol  - maintain MAP 65-70  - abx as below  - TTE 4/27 EF 64%, LAE, mild AR, mild MR    PULM  # ARDS  - P/F reported to be 70 at [facility], reflecting severe ARDS  - etiology unclear, possibly 2/2 pneumonia that was worsened by pneumonitis from hematemesis. Patient reportedly hypoxemic prior to episode of hematemesis, ?septic emboli/endocarditis iso sclerotic/calcified aortic valve on POCUS  - maintain lung protective ventilation, wean per ICU protocol  - paralyzed at [facility], can consider proning if without improvement  - CD with images from [facility] obtained, will bring to radiology to upload onto PACS  - duonebs q6    RENAL  # LUBA  - sCr on admit 1.86, baseline unclear  - likely hepatorenal syndrome, vs ATN from shock  - Renal protective measures: Please renally dose all medications; Avoid nephrotoxic medications (NSAIDS, IV contrast); Avoid ACEi and ARBs in the setting of LUBA; Maintain MAP >65;   - indwelling geronimo replaced on admit 4/27 from OSH,   - c/w bumex gtt, increased to 1mg/hr  - monitor strict I&O  - albumin x2 days    GI  # hematemesis  - EGD at OSH noted blood clots in oropharynx, no obvious bleeding source, normal esophagus  - no epistaxis noted on exam  - 4/28 increased bloody output from OGT, GI contacted, considering EGD  - protonix 40 mg IVP BID  - strict NPO  - cbc q6    # acute liver failure  - send eval for liver injury: serum tox (tylenol level), hepatitis labs, unlikely shock liver.    - Last EtOH use: reportedly 4/20/24 per documentation from [facility]  - Smooth muscle ab 1:20   - MELD-Na: 33    - Variceal status: normal esophagus on EGD at OSH  - c/w NAC per hep, currently receiving 100mg/kg in 1000 mL 5% dextrose solution x4 days (4/28 at 1pm through 5/2 at 1pm) - REORDER DAILY  - GI following, will appreciate recs    ID  # SIRS+  - source unclear, possible pneumonia (minimal secretions), vs ?endocarditis vs UTI  - await urine and blood cultures. full RVP and MRSA swab pending   - s/p vanco and azithro, d/c iso neg legionella and mrsa  - c/w zosyn (4/27 - )  - preview images from OSH, can consider repeat CT CAP    ENDO  #Low TSH  - TSH 0.10  - f/u FT4, FT3    # HEME/ONC  # bicytopenia  # elevated INR  - likely iso liver failure vs acute bleed at OSH  - s/p 4 u prbc and 2 u FFP at OSH. received 1 u prbc, 1 u plt, and 1 u FFP on 4/27  - transfuse for hb <7, plt <50 for bleeding  - monitor fibrinogen QD    #Portal vein thrombosis  - Evidence of portal vein thrombosis on US abdomne  - hold AC at this time iso upper GIB    DERM  # jaundice  - likely iso elevated bilirubin, acute liver failure    #B/l LE edema  - f/u Va duplex b/l LE to r/o DVTs    DVT: hold for now given anemia/hematemesis, SCD  FENGI: NPO for now given GIB, will place OG tube   LDA: left radial a-line, right IJ  GTT: prop, fent, nimbex, levo, vaso  GOC: Full code for now, family: father Justice and has a brother

## 2024-04-28 NOTE — PROGRESS NOTE ADULT - ATTENDING COMMENTS
Patient is a 44 yo M w/ EOTH misuse disorder, PVD, PAD, PTSD, restless leg syndrome who is transferred from [place] and admitted to MICU for ARDS, acute anemia, GIB, and acute liver failure.     #ARDS  #Acute hypoxemic respiratory failure  #Acute blood loss anemia  #Acute liver failure  #GIB  #LUBA  #Shock  #Seizure - Noted seizure like activity 4/27 AM. EEG negative so far  #PVT  - c/w vasopressors to maintain MAP > 65  - c/w mechanical ventilatory support, lung protective ventilation as tolerated.  (IBW 68)  - Monitor ABGs  - c/w sedation and paralysis for now  - f/u vEEG  - NPO for now, Trend CBC q6h. Trend Coags and Fibrinogen. Order Vit K  - f/u GI/Hep recs. c/w NAC and octreotide gtt. Dark output from OGT  - Monitor BMP and UOP, diuresis as needed to maintain euvolemia. Increase Bumex gtt to 1mg/hr  - f/u BAL studies  - c/w empiric Zosyn for now, f/u cultures  - RLE duplex for swelling but suspect hematoma  - Check Free T4  - Holding AC given anemia and concern for GIB    Domenico Krueger MD  Pulmonary & Critical Care Patient is a 44 yo M w/ EOTH misuse disorder, PVD, PAD, PTSD, restless leg syndrome who is transferred from [place] and admitted to MICU for ARDS, acute anemia, GIB, and acute liver failure.     #ARDS  #Acute hypoxemic respiratory failure  #Acute blood loss anemia  #Acute liver failure  #GIB  #LUBA  #Shock  #Seizure - Noted seizure like activity 4/27 AM. EEG negative so far  #PVT  #DAH  - c/w vasopressors to maintain MAP > 65  - c/w mechanical ventilatory support, lung protective ventilation as tolerated.  (IBW 68)  - Monitor ABGs  - c/w sedation and paralysis for now  - f/u vEEG  - NPO for now, Trend CBC q6h. Trend Coags and Fibrinogen. Order Vit K  - f/u GI/Hep recs. c/w NAC and octreotide gtt. Dark output from OGT  - Monitor BMP and UOP, diuresis as needed to maintain euvolemia. Increase Bumex gtt to 1mg/hr  - f/u BAL studies. ?DAH on bronch 4/27, possible in setting of coagulopathy. Will send DAH workup  - c/w empiric Zosyn for now, f/u cultures  - RLE duplex for swelling but suspect hematoma  - Check Free T4  - Holding AC given anemia and concern for GIB    Domenico Krueger MD  Pulmonary & Critical Care

## 2024-04-28 NOTE — PROGRESS NOTE ADULT - ASSESSMENT
Mr. Burns is a 43 year old male with alcohol use disorder, restless leg syndrome, PVD, anxiety/PTSD who arrived to [facility] as a transfer from [facility] for respiratory failure and ECMO evaluation in the context of liver failure. Patient presented to OSH on 4/25 for chest pain with a hospital course c/b hematemesis, respiratory failure and hepatic dysfunction.     #Acute on chronic liver failure (ACLF)  #Alcohol-related cirrhosis  #ARDS  #Hematemesis  #Normocytic anemia  #Thrombocytopenia  Patient with jaundice, coagulopathy and encephalopathy i/s/o alcohol use disorder and cirrhosis. Chronicity of underlying liver disease is unclear at this time. Suspect ACLF due to alcohol use. DDx includes Tylenol toxicity, viral illness, autoimmune hepatitis. MELD 3.0 score is 34>38. Patient underwent EGD at OSH for hematemesis with findings of PHG, normal esophagus and hematin in the gastric fundus without actively bleeding source. Esophageal varices were not reported. Gastric varices are not entirely excluded.  Tylenol level and salicylate level were undetectable on 4/27. Acute viral hepatitis panel negative. HBcAb nonreactive. ASMA/AMA negative. UTox negative. HIV/CMV negative. Ceruloplasmin normal. Ferritin elevation not c/w hemochromatosis. TTE with severely dilated LV, mildly enlarged RV. MDF >80, however not a candidate for prednisone therapy for alcoholic hepatitis due to recent bleeding and critical illness. Patient remains intubated, ventilated and sedated on two vasopressors. RUQ US with hepatic steatosis and no ascites. CT at [facility] reports nodular liver and splenomegaly, likely cirrhotic. Doppler US with possible PVT, however not noted on CT from [facility].     Recommendations:  - Continue empiric antibiotics  - Complete infectious work-up including cultures  - Continue NAC @ 100mg/kg / 24 hours   - Octreotide, IV PPI BID  - Maintain active T&S and transfuse as needed  - Monitor for recurrent bleeding. OG output likely represents old blood noted on recent endoscopy.   - Okay for trickle feeds via OG tube  - Obtain OSH EGD report. May consider repeat EGD pending clinical course.   - No anticoagulation for possible PVT on doppler as not detected on CT, patient with recent bleed and coagulopathy  - F/u viral etiologies: HEV IgM, HSV PCR, EBV PCR  - Trend CBC, CMP, INR, Mg, Phos  - elevate bed to 45 degrees   - if evidence of seizure activity, recommend vEEG, use of ativan or phosphenytoin PRN  - Hemodynamic and respiratory support per ICU team    Jurgen Ross MD  Gastroenterology/Hepatology Fellow  Available via [employer] Teams  Pager: [phone]    On Weekdays after 5 PM to 8AM and Weekends/Holidays (All day)  For non-urgent consults, please email [email or web address] or [email or web address]  For urgent consults, please contact on call GI team.  See Amion schedule ([facility]), [employer] paging system - [phone] ([facility]), or call hospital  ([facility])

## 2024-04-28 NOTE — PROGRESS NOTE ADULT - SUBJECTIVE AND OBJECTIVE BOX
Interval Events:   Patient remains intubated, sedated, on paralytics and multiple vasopressors  OGT was placed with return of dark content, likely old hematin noted on prior endoscopy. Hgb remains stable without transfusion requirement  T Bili up-trending, MELD 38  Abdominal US with hepatic steatosis, no ascites  Doppler US with findings of no flow is obtained within the main portal vein and low level echoes within the main portal vein would indicate portal venous thrombosis.     Hospital Medications:  albumin human 25% IVPB 100 milliLiter(s) IV Intermittent every 6 hours  albuterol/ipratropium for Nebulization 3 milliLiter(s) Nebulizer every 6 hours  albuterol/ipratropium for Nebulization. 3 milliLiter(s) Nebulizer once  buMETAnide Infusion 1.5 mG/Hr IV Continuous <Continuous>  chlorhexidine 0.12% Liquid 15 milliLiter(s) Oral Mucosa every 12 hours  chlorhexidine 2% Cloths 1 Application(s) Topical daily  cisatracurium Infusion 2 MICROgram(s)/kG/Min IV Continuous <Continuous>  fentaNYL   Infusion. 3 MICROgram(s)/kG/Hr IV Continuous <Continuous>  mupirocin 2% Ointment 1 Application(s) Both Nostrils every 12 hours  norepinephrine Infusion 0.14 MICROgram(s)/kG/Min IV Continuous <Continuous>  octreotide  Infusion 50 MICROgram(s)/Hr IV Continuous <Continuous>  pantoprazole  Injectable 40 milliGRAM(s) IV Push two times a day  piperacillin/tazobactam IVPB.. 3.375 Gram(s) IV Intermittent every 12 hours  propofol Infusion 49.946 MICROgram(s)/kG/Min IV Continuous <Continuous>  vasopressin Infusion 0.04 Unit(s)/Min IV Continuous <Continuous>      ROS: See above.    PHYSICAL EXAM:   Vital Signs:  Vital Signs Last 24 Hrs  T(C): 37.3 (28 Apr 2024 12:00), Max: 37.8 (28 Apr 2024 00:00)  T(F): 99.2 (28 Apr 2024 12:00), Max: 100 (28 Apr 2024 00:00)  HR: 99 (28 Apr 2024 15:26) (97 - 127)  BP: --  BP(mean): --  RR: 28 (28 Apr 2024 14:00) (24 - 28)  SpO2: 93% (28 Apr 2024 15:26) (89% - 98%)    Parameters below as of 28 Apr 2024 15:26  Patient On (Oxygen Delivery Method): ventilator      GENERAL:  Intubated, sedated, paralyzed   HEENT: OG tube in place  CHEST:  MV via ETT  HEART:  on vasopressors  ABDOMEN:  Soft, distended  EXTREMITIES:  1-2+ pitting edema b/L LE. Ecchymosis noted on RLE.   SKIN:  Warm & Dry. Jaundiced.   NEURO:  sedated    LABS:                        6.9    7.50  )-----------( 46       ( 28 Apr 2024 14:00 )             20.9     Mean Cell Volume: 94.1 fL (04-28-24 @ 14:00)    04-28    134<L>  |  96<L>  |  65<H>  ----------------------------<  121<H>  3.7   |  19<L>  |  2.01<H>    Ca    8.6      28 Apr 2024 14:00  Phos  4.5     04-28  Mg     2.30     04-28    TPro  6.7  /  Alb  3.9  /  TBili  18.8<H>  /  DBili  x   /  AST  143<H>  /  ALT  42<H>  /  AlkPhos  29<L>  04-28    LIVER FUNCTIONS - ( 28 Apr 2024 14:00 )  Alb: 3.9 g/dL / Pro: 6.7 g/dL / ALK PHOS: 29 U/L / ALT: 42 U/L / AST: 143 U/L / GGT: x           PT/INR - ( 28 Apr 2024 03:50 )   PT: 33.1 sec;   INR: 3.03 ratio         PTT - ( 28 Apr 2024 03:50 )  PTT:35.2 sec    IMAGING:    < from: US Abdomen Upper Quadrant Right (04.27.24 @ 13:20) >  FINDINGS:  Liver: Echogenic liver compatible with steatosis.  Bile ducts: Normal caliber. Common bile duct measures 5 mm.  Gallbladder: Large echogenic material and gallbladder suggestive of   tumefactive sludge, however cannot excludemass. Mild gallbladder wall   edema.  Pancreas: Poorly visualized.  Ascites: None.    IMPRESSION:  Large echogenic material and gallbladder suggestive of tumefactive   sludge, however cannot exclude mass. Mild gallbladder wall edema.   Recommend clinical correlation as well as evaluation with   contrast-enhanced MRI.    Hepatic steatosis.    < end of copied text >    < from: US Abdomen Doppler (04.27.24 @ 15:49) >  No flow is obtained within the main portal vein and low level echoes   within the main portal vein would indicate portal venous thrombosis.   Hepatofugal flow in the right portal vein. Hepatopetal flow in the left   portal vein. There is recannulization of the paraumbilical vein.   Hepatofugal flow is also seen within the splenic vein.    < end of copied text >

## 2024-04-28 NOTE — EEG REPORT - NS EEG TEXT BOX
PAOLOOPALSANGEETA [id number]     Study Date: 	04-27-24 to 04-28-24  Duration in hours:  18 hr    --------------------------------------------------------------------------------------------------  History:  CC/ HPI Patient is a 43y old  Male who presents with a chief complaint of ECMO eval (28 Apr 2024 06:34)    MEDICATIONS  (STANDING):    fentaNYL   Infusion. 3 MICROgram(s)/kG/Hr (27.6 mL/Hr) IV Continuous <Continuous>  propofol Infusion 49.946 MICROgram(s)/kG/Min (27.6 mL/Hr) IV Continuous <Continuous>  vasopressin Infusion 0.04 Unit(s)/Min (6 mL/Hr) IV Continuous <Continuous>    --------------------------------------------------------------------------------------------------  Study Interpretation:    [Abbreviation Key:  PDR=alpha rhythm/posterior dominant rhythm. A-P=anterior posterior.  Amplitude: ‘very low’:<20; ‘low’:20-49; ‘medium’:; ‘high’:>150uV.  Persistence for periodic/rhythmic patterns (% of epoch) ‘rare’:<1%; ‘occasional’:1-10%; ‘frequent’:10-50%; ‘abundant’:50-90%; ‘continuous’:>90%.  Persistence for sporadic discharges: ‘rare’:<1/hr; ‘occasional’:1/min-1/hr; ‘frequent’:>1/min; ‘abundant’:>1/10 sec.  RPP=rhythmic and periodic patterns; GRDA=generalized rhythmic delta activity; FIRDA=frontal intermittent GRDA; LRDA=lateralized rhythmic delta activity; TIRDA=temporal intermittent rhythmic delta activity;  LPD=PLED=lateralized periodic discharges; GPD=generalized periodic discharges; BIPDs =bilateral independent periodic discharges; Mf=multifocal; SIRPDs=stimulus induced rhythmic, periodic, or ictal appearing discharges; BIRDs=brief potentially ictal rhythmic discharges >4 Hz, lasting .5-10s; PFA (paroxysmal bursts >13 Hz or =8 Hz <10s).  Modifiers: +F=with fast component; +S=with spike component; +R=with rhythmic component.  S-B=burst suppression pattern.  Max=maximal. N1-drowsy; N2-stage II sleep; N3-slow wave sleep. SSS/BETS=small sharp spikes/benign epileptiform transients of sleep. HV=hyperventilation; PS=photic stimulation]    FINDINGS:      Background:  Continuity: continuous  Symmetry: symmetric  PDR: Absent  Reactivity: absent  Voltage: normal (between 20-150uV)  Anterior Posterior Gradient: present  Other background findings: none  Breach: absent    Background Slowing:  Generalized slowing: diffuse irregular delta and theta activity.  Focal slowing: none was present.    State Changes:   Absent    Sporadic Epileptiform Discharges:    None    Rhythmic and Periodic Patterns (RPPs):  None     Electrographic and Electroclinical seizures:  None    Other Clinical Events:  None    Activation Procedures:   -Hyperventilation was not performed.    -Photic stimulation was not performed.     Artifacts:  Intermittent myogenic and movement artifacts were noted.    ECG:  The heart rate on single channel ECG was predominantly between  BPM.    EEG Classification / Summary:  Abnormal EEG study.  Moderate to severe generalized background slowing    -----------------------------------------------------------------------------------------------------    Clinical Impression:  Moderate to severe diffuse/multi-focal cerebral dysfunction, not specific as to etiology.  There were no epileptiform abnormalities recorded.      This is a preliminary fellow impression only pending attending review    Samuel Chanel MD - Epilepsy Fellow      PAOLOOPALSANGEETA [id number]     Study Date: 	04-27-24 to 04-28-24  Duration in hours:  18 hr    --------------------------------------------------------------------------------------------------  History:  CC/ HPI Patient is a 43y old  Male who presents with a chief complaint of ECMO eval (28 Apr 2024 06:34)    MEDICATIONS  (STANDING):    fentaNYL   Infusion. 3 MICROgram(s)/kG/Hr (27.6 mL/Hr) IV Continuous <Continuous>  propofol Infusion 49.946 MICROgram(s)/kG/Min (27.6 mL/Hr) IV Continuous <Continuous>  vasopressin Infusion 0.04 Unit(s)/Min (6 mL/Hr) IV Continuous <Continuous>    --------------------------------------------------------------------------------------------------  Study Interpretation:    [Abbreviation Key:  PDR=alpha rhythm/posterior dominant rhythm. A-P=anterior posterior.  Amplitude: ‘very low’:<20; ‘low’:20-49; ‘medium’:; ‘high’:>150uV.  Persistence for periodic/rhythmic patterns (% of epoch) ‘rare’:<1%; ‘occasional’:1-10%; ‘frequent’:10-50%; ‘abundant’:50-90%; ‘continuous’:>90%.  Persistence for sporadic discharges: ‘rare’:<1/hr; ‘occasional’:1/min-1/hr; ‘frequent’:>1/min; ‘abundant’:>1/10 sec.  RPP=rhythmic and periodic patterns; GRDA=generalized rhythmic delta activity; FIRDA=frontal intermittent GRDA; LRDA=lateralized rhythmic delta activity; TIRDA=temporal intermittent rhythmic delta activity;  LPD=PLED=lateralized periodic discharges; GPD=generalized periodic discharges; BIPDs =bilateral independent periodic discharges; Mf=multifocal; SIRPDs=stimulus induced rhythmic, periodic, or ictal appearing discharges; BIRDs=brief potentially ictal rhythmic discharges >4 Hz, lasting .5-10s; PFA (paroxysmal bursts >13 Hz or =8 Hz <10s).  Modifiers: +F=with fast component; +S=with spike component; +R=with rhythmic component.  S-B=burst suppression pattern.  Max=maximal. N1-drowsy; N2-stage II sleep; N3-slow wave sleep. SSS/BETS=small sharp spikes/benign epileptiform transients of sleep. HV=hyperventilation; PS=photic stimulation]    FINDINGS:      Background:  Continuity: continuous  Symmetry: symmetric  PDR: Absent  Reactivity: absent  Voltage: normal (between 20-150uV)  Anterior Posterior Gradient: present  Other background findings: none  Breach: absent    Background Slowing:  Generalized slowing: diffuse irregular delta and theta activity.  Focal slowing: none was present.    State Changes:   Absent    Sporadic Epileptiform Discharges:    None    Rhythmic and Periodic Patterns (RPPs):  None     Electrographic and Electroclinical seizures:  None    Other Clinical Events:  None    Activation Procedures:   -Hyperventilation was not performed.    -Photic stimulation was not performed.     Artifacts:  Intermittent myogenic and movement artifacts were noted.    ECG:  The heart rate on single channel ECG was predominantly between  BPM.    EEG Classification / Summary:  Abnormal EEG study.  Moderate to severe generalized background slowing    -----------------------------------------------------------------------------------------------------    Clinical Impression:  Moderate to severe diffuse/multi-focal cerebral dysfunction, not specific as to etiology.  There were no epileptiform abnormalities recorded.      Samuel Chanel MD - Epilepsy Fellow     Alex Palma MD PhD  Director, Epilepsy Division, [facility] EEG Reading Room Ph#: [phone]  Epilepsy Answering Service after 5PM and before 8:30AM: Ph#: [phone]

## 2024-04-29 NOTE — CONSULT NOTE ADULT - ATTENDING COMMENTS
43M with AUD presenting from [place] due to concerns for ARDS in background of patient with AUD and possible cirrhosis  EGD without overt evidence of varices - noted PHG  Will try to obtain records from [place] for review  Patient with likely ETOH Hepatitis  Would defer steroids at this time  Calculate Daily MELD  Follow up cultures  Empiric Abx  Agree with PPI and Octreotide  Plan for Bronch today    Aim for extubation then plan for discussion in regards to patients liver disease and even possible need for liver transplant in future
Patient seen and examined with Fellow, agree with assessment and plan.  Patient with high FiO2 requirements and volume overload in setting of LUBA/cirrhosis/acute bleed requiring multiple blood products.  Consent obtained from patient's father via telephone by fellow for CRRT.  Patient's father had many questions about GOC and overall prognosis.  Consider palliative care consultation

## 2024-04-29 NOTE — PROGRESS NOTE ADULT - ATTENDING COMMENTS
1. Acute hypoxemic respiratory failure secondary to ARDS. POCUS reveals  enlarging consolidations bilaterally. Pt remains on FIO2 100%. and PEEP 12.Plateau pressure 37-38.  Pt is responsive to recruitment maneuvers.  Decrease PEEP 10 . Plateau 29. Will prone patient.    2. Renal. Acute renal failure  due to ATN vs hepatorenal failure. Pt not responding well to Bumex drip. Positive fluid balance > 2 liters last 24 hrs.     Will start CVVHD.    3. GI/Liver:  Decompensated ETOH induced liver cirrhosis. Pt had EGD for Gi bleed. No active bleeding.  Continue Octreotide,  and PPI.   Translate     4. Hypotension: Pt remains dependent on  Levophed and Vasopressin.  Titrate for MAP> 60. Trial of stress steroids.    5.  DVT prophylaxis: SCD    6. GOC: DNR 1. Acute hypoxemic respiratory failure secondary to ARDS. POCUS reveals  enlarging consolidations bilaterally. Pt remains on FIO2 100%. and PEEP 12.Plateau pressure 37-38.  Pt is responsive to recruitment maneuvers.  Decrease PEEP 10 . Plateau 29. Will prone patient.    2. Renal. Acute renal failure  due to ATN vs hepatorenal failure. Pt not responding well to Bumex drip. Positive fluid balance > 2 liters last 24 hrs.     Will start CVVHD.    3. GI/Liver:  Decompensated ETOH induced liver cirrhosis. Pt had EGD for Gi bleed. No active bleeding.  Continue Octreotide,  and PPI.   Translate     4. Hypotension: Pt remains dependent on  Levophed and Vasopressin.  Titrate for MAP> 60. Trial of stress steroids.    5.  DVT prophylaxis: SCD    6. GOC: DNR.    ID. Continue Zosyn for pneumonia.

## 2024-04-29 NOTE — EEG REPORT - NS EEG TEXT BOX
PAOLO SANGEETA [facility]-[id number]     Study Date: 4/28/24 08:00 - 4/29/24 08:00  Duration: 24 hr  --------------------------------------------------------------------------------------------------  History:  CC/ HPI Patient is a 43y old  Male who presents with a chief complaint of ARDS (28 Apr 2024 15:32)    MEDICATIONS  (STANDING):  albuterol/ipratropium for Nebulization 3 milliLiter(s) Nebulizer every 6 hours  albuterol/ipratropium for Nebulization. 3 milliLiter(s) Nebulizer once  buMETAnide Infusion 2.5 mG/Hr (12.5 mL/Hr) IV Continuous <Continuous>  chlorhexidine 0.12% Liquid 15 milliLiter(s) Oral Mucosa every 12 hours  chlorhexidine 2% Cloths 1 Application(s) Topical daily  cisatracurium Infusion 2 MICROgram(s)/kG/Min (11.1 mL/Hr) IV Continuous <Continuous>  fentaNYL   Infusion. 3 MICROgram(s)/kG/Hr (27.6 mL/Hr) IV Continuous <Continuous>  mupirocin 2% Ointment 1 Application(s) Both Nostrils every 12 hours  norepinephrine Infusion 0.14 MICROgram(s)/kG/Min (12.1 mL/Hr) IV Continuous <Continuous>  octreotide  Infusion 50 MICROgram(s)/Hr (10 mL/Hr) IV Continuous <Continuous>  pantoprazole  Injectable 40 milliGRAM(s) IV Push two times a day  piperacillin/tazobactam IVPB.. 3.375 Gram(s) IV Intermittent every 12 hours  propofol Infusion 49.946 MICROgram(s)/kG/Min (27.6 mL/Hr) IV Continuous <Continuous>  vasopressin Infusion 0.04 Unit(s)/Min (6 mL/Hr) IV Continuous <Continuous>    --------------------------------------------------------------------------------------------------  Study Interpretation:    [[[Abbreviation Key:  PDR=alpha rhythm/posterior dominant rhythm. A-P=anterior posterior.  Amplitude: ‘very low’:<20; ‘low’:20-49; ‘medium’:; ‘high’:>150uV.  Persistence for periodic/rhythmic patterns (% of epoch) ‘rare’:<1%; ‘occasional’:1-10%; ‘frequent’:10-50%; ‘abundant’:50-90%; ‘continuous’:>90%.  Persistence for sporadic discharges: ‘rare’:<1/hr; ‘occasional’:1/min-1/hr; ‘frequent’:>1/min; ‘abundant’:>1/10 sec.  RPP=rhythmic and periodic patterns; GRDA=generalized rhythmic delta activity; FIRDA=frontal intermittent GRDA; LRDA=lateralized rhythmic delta activity; TIRDA=temporal intermittent rhythmic delta activity;  LPD=PLED=lateralized periodic discharges; GPD=generalized periodic discharges; BIPDs =bilateral independent periodic discharges; Mf=multifocal; SIRPDs=stimulus induced rhythmic, periodic, or ictal appearing discharges; BIRDs=brief potentially ictal rhythmic discharges >4 Hz, lasting .5-10s; PFA (paroxysmal bursts >13 Hz or =8 Hz <10s).  Modifiers: +F=with fast component; +S=with spike component; +R=with rhythmic component.  S-B=burst suppression pattern.  Max=maximal. N1-drowsy; N2-stage II sleep; N3-slow wave sleep. SSS/BETS=small sharp spikes/benign epileptiform transients of sleep. HV=hyperventilation; PS=photic stimulation]]]    Daily EEG Visual Analysis    FINDINGS:      Background:  The background is symmetric and nearly continuous. The predominant background consists of diffuse polymorphic delta slowing, intermittently attenuated for 1-2 seconds, with intermittent theta and rare fragments of beta frequencies. There is no posterior dominant rhythm.     Background Slowing:  Generalized slowing: As above  Focal slowing: None    State Changes:   Absent    Interictal Findings:  None    Electrographic and Electroclinical seizures:  None    Other Clinical Events:  None    Activation Procedures:   Hyperventilation is not performed.    Photic stimulation is not performed.    Artifacts:  Intermittent myogenic and movement artifacts are present.    EKG:  Single-lead EKG limited by artifact for most of the study. Sinus rhythm at around 100 bpm during a adable portion later in the study.    EEG Classification / Summary:  Abnormal EEG in a comatose patient.  Moderate-severe diffuse slowing.  No focal or epileptiform abnormalities are captured.     Clinical Impression:  Moderate-severe diffuse cerebral dysfunction is nonspecific in etiology.  No epileptiform abnormalities or seizures x2 days of recording.          -------------------------------------------------------------------------------------------------------    María Manzano MD  Attending Physician, [facility] Epilepsy Center    -------------------------------------------------------------------------------------------------------    To reach EEG technologist:  Please use the pager number for the appropriate hospital or contact the .  At [facility] - Pager #: [phone]    To reach EEG-reading physician:  [facility] EEG Reading Room Phone #: [phone]  Epilepsy Answering Service after 5PM and before 8:30AM: Phone #: [phone]     PAOLOOPALSANGEETA [id number]     Study Date: 4/28/24 08:00 - 4/29/24 14:36  Duration: 30 hr 32 min  --------------------------------------------------------------------------------------------------  History:  CC/ HPI Patient is a 43y old  Male who presents with a chief complaint of ARDS (28 Apr 2024 15:32)    MEDICATIONS  (STANDING):  albuterol/ipratropium for Nebulization 3 milliLiter(s) Nebulizer every 6 hours  albuterol/ipratropium for Nebulization. 3 milliLiter(s) Nebulizer once  buMETAnide Infusion 2.5 mG/Hr (12.5 mL/Hr) IV Continuous <Continuous>  chlorhexidine 0.12% Liquid 15 milliLiter(s) Oral Mucosa every 12 hours  chlorhexidine 2% Cloths 1 Application(s) Topical daily  cisatracurium Infusion 2 MICROgram(s)/kG/Min (11.1 mL/Hr) IV Continuous <Continuous>  fentaNYL   Infusion. 3 MICROgram(s)/kG/Hr (27.6 mL/Hr) IV Continuous <Continuous>  mupirocin 2% Ointment 1 Application(s) Both Nostrils every 12 hours  norepinephrine Infusion 0.14 MICROgram(s)/kG/Min (12.1 mL/Hr) IV Continuous <Continuous>  octreotide  Infusion 50 MICROgram(s)/Hr (10 mL/Hr) IV Continuous <Continuous>  pantoprazole  Injectable 40 milliGRAM(s) IV Push two times a day  piperacillin/tazobactam IVPB.. 3.375 Gram(s) IV Intermittent every 12 hours  propofol Infusion 49.946 MICROgram(s)/kG/Min (27.6 mL/Hr) IV Continuous <Continuous>  vasopressin Infusion 0.04 Unit(s)/Min (6 mL/Hr) IV Continuous <Continuous>    --------------------------------------------------------------------------------------------------  Study Interpretation:    [[[Abbreviation Key:  PDR=alpha rhythm/posterior dominant rhythm. A-P=anterior posterior.  Amplitude: ‘very low’:<20; ‘low’:20-49; ‘medium’:; ‘high’:>150uV.  Persistence for periodic/rhythmic patterns (% of epoch) ‘rare’:<1%; ‘occasional’:1-10%; ‘frequent’:10-50%; ‘abundant’:50-90%; ‘continuous’:>90%.  Persistence for sporadic discharges: ‘rare’:<1/hr; ‘occasional’:1/min-1/hr; ‘frequent’:>1/min; ‘abundant’:>1/10 sec.  RPP=rhythmic and periodic patterns; GRDA=generalized rhythmic delta activity; FIRDA=frontal intermittent GRDA; LRDA=lateralized rhythmic delta activity; TIRDA=temporal intermittent rhythmic delta activity;  LPD=PLED=lateralized periodic discharges; GPD=generalized periodic discharges; BIPDs =bilateral independent periodic discharges; Mf=multifocal; SIRPDs=stimulus induced rhythmic, periodic, or ictal appearing discharges; BIRDs=brief potentially ictal rhythmic discharges >4 Hz, lasting .5-10s; PFA (paroxysmal bursts >13 Hz or =8 Hz <10s).  Modifiers: +F=with fast component; +S=with spike component; +R=with rhythmic component.  S-B=burst suppression pattern.  Max=maximal. N1-drowsy; N2-stage II sleep; N3-slow wave sleep. SSS/BETS=small sharp spikes/benign epileptiform transients of sleep. HV=hyperventilation; PS=photic stimulation]]]    Daily EEG Visual Analysis    FINDINGS:      Background:  The background is symmetric and nearly continuous. The predominant background consists of diffuse polymorphic delta slowing, intermittently attenuated for 1-2 seconds, with intermittent theta and rare fragments of beta frequencies. There is no posterior dominant rhythm.   By the end of recording, the background becomes approximately 60% suppressed < 10 uV.    Background Slowing:  Generalized slowing: As above  Focal slowing: None    State Changes:   Absent    Interictal Findings:  None    Electrographic and Electroclinical seizures:  None    Other Clinical Events:  None    Activation Procedures:   Hyperventilation is not performed.    Photic stimulation is not performed.    Artifacts:  Intermittent myogenic and movement artifacts are present.    EKG:  Single-lead EKG limited by artifact for most of the study. Sinus rhythm at around 100-110 bpm during a readable portion later in the study.    EEG Classification / Summary:  Abnormal EEG in a comatose patient.  Moderate-severe diffuse slowing, worsening to severe by the end of recording.  No focal or epileptiform abnormalities are captured.     Clinical Impression:  Moderate-severe worsening to severe diffuse cerebral dysfunction is nonspecific in etiology.  No epileptiform abnormalities or seizures x2 days of recording.          -------------------------------------------------------------------------------------------------------    María Manzano MD  Attending Physician, [facility] Epilepsy Center    -------------------------------------------------------------------------------------------------------    To reach EEG technologist:  Please use the pager number for the appropriate hospital or contact the .  At [facility] - Pager #: [phone]    To reach EEG-reading physician:  [facility] EEG Reading Room Phone #: [phone]  Epilepsy Answering Service after 5PM and before 8:30AM: Phone #: [phone]

## 2024-04-29 NOTE — DIETITIAN INITIAL EVALUATION ADULT - PERTINENT LABORATORY DATA
04-29    132<L>  |  95<L>  |  70<H>  ----------------------------<  97  4.1   |  17<L>  |  2.55<H>    Ca    8.5      29 Apr 2024 08:15  Phos  4.9     04-29  Mg     2.00     04-29    CAPILLARY BLOOD GLUCOSE  POCT Blood Glucose.: 114 mg/dL (29 Apr 2024 05:25)  POCT Blood Glucose.: 117 mg/dL (29 Apr 2024 02:13)  POCT Blood Glucose.: 127 mg/dL (28 Apr 2024 16:55)  POCT Blood Glucose.: 155 mg/dL (28 Apr 2024 13:54)    A1C with Estimated Average Glucose Result: 4.5 % (04-27-24 @ 10:30)

## 2024-04-29 NOTE — DIETITIAN INITIAL EVALUATION ADULT - ADD RECOMMEND
---- multivitamin, thiamine, folic acid  ---- monitor tolerance to TF (when started), GI status, electrolytes, glucose, triglycerides

## 2024-04-29 NOTE — DIETITIAN INITIAL EVALUATION ADULT - OTHER INFO
Spoke with RN.  Pt. reported to eventually be starting CRRT.   Has been receiving diuresis.    Pt. remains intubated/sedated, also paralyzed.  ~729 non-nutritive lipid calories provided by Propofol over past 24hrs.  On vEEG monitoring.    Pt. with OGT to suction... has had >850mL output in past 24hrs.   Output current bilious.  Has not had bowel movement since admit.... per discussion with resident physician, Pt. to be given Relistor.  If/when bowel movement occurs, will then consider initiation of enteral nutrition.    Verbalized nutrition suggestions to physician, including obtaining triglycerides, and ordering multivitamin, thiamine & folic acid supplementation.

## 2024-04-29 NOTE — CHART NOTE - NSCHARTNOTEFT_GEN_A_CORE
:  Richmond Escobar  INDICATION:  shock  PROCEDURE:  [x ] LIMITED ECHO  [x ] LIMITED CHEST  [ ] LIMITED RETROPERITONEAL  [ ] LIMITED ABDOMINAL  [ ] LIMITED DVT  [ ] NEEDLE GUIDANCE VASCULAR  [ ] NEEDLE GUIDANCE THORACENTESIS  [ ] NEEDLE GUIDANCE PARACENTESIS  [ ] NEEDLE GUIDANCE PERICARDIOCENTESIS  [ ] OTHER    FINDINGS: Lungs: B lines bilaterally with R> L lower lobe consolidations. No pleural  effusion.                  ECHO: Normal LV systolic function. RV is enlarged with sign of pressure overload with septum bowing into LV cavity.      INTERPRETATION: Bilateral consolidations and bilines consistent  with clinical picture of ARDS                             Dilated RV with pressure overload. :  Richmond Escobar  INDICATION:  shock  PROCEDURE:  [x ] LIMITED ECHO  [x ] LIMITED CHEST  [ ] LIMITED RETROPERITONEAL  [ ] LIMITED ABDOMINAL  [ ] LIMITED DVT  [ ] NEEDLE GUIDANCE VASCULAR  [ ] NEEDLE GUIDANCE THORACENTESIS  [ ] NEEDLE GUIDANCE PARACENTESIS  [ ] NEEDLE GUIDANCE PERICARDIOCENTESIS  [ ] OTHER    FINDINGS: Lungs: B lines bilaterally with R> L lower lobe consolidations. No pleural  effusion.                  ECHO: Normal LV systolic function. RV is enlarged with sign of pressure overload with septum bowing into LV cavity.      INTERPRETATION: Bilateral consolidations and bilines consistent  with clinical picture of ARDS                             Dilated RV with pressure overload.    I have assisted and supervised entire procedure.     Celio Fragoso MD

## 2024-04-29 NOTE — DIETITIAN INITIAL EVALUATION ADULT - REASON FOR ADMISSION
History of extracorporeal membrane oxygenation treatment    44 y/o presenting to [facility] (4/25) intubated/sedated for airway protection and with incidental findings of GI bleed. Transferred from [facility] MICU to  [facility] MICU for VV-ECMO evaluation in setting of acute hypoxic respiratory failure 2/2 PNA with ARDS with   Also with seizures, jaundice, LUBA, SIRS and shock.

## 2024-04-29 NOTE — CONSULT NOTE ADULT - ASSESSMENT
43M with hx of alcohol use disorder and PAD, presented as a transfer from [facility] ED for acute decompensated liver and respiratory failure and possible ECMO eval. Nephrology service consulted for LUBA and volume overload.

## 2024-04-29 NOTE — PROGRESS NOTE ADULT - ASSESSMENT
43M with hx of alcohol use disorder, restless leg syndrome, PVD, anxiety/PTSD ( service), PAD, taking adderall at home, presented as a transfer from [facility] ED for acute decompensated liver and respiratory failure and possible ECMO eval.     NEURO  # encephalopathy  - Pt encephalopathic on admission likely metabolic vs hepatic encephalopathy (ammonia 94). Intubated for airway protection  - currently sedated and paralyzed (on fent, prop, nimbex)  - Will add ketamine at 0.5mg, and titrate fent from 4.5mcg to 3mcg. Can increase ketamine to 1-2mg to titrate fentanyl now  - maintain RAAS -4 to -5 to maintain vent synchrony, wean per ICU protocol    #Seizures   - no documented history of seizures, appeared to be having seizure like activity during rounds 4/27  - s/o veeg negative  - monitor for now    CARDS  # shock  - likely vasoplegia iso sepsis vs hemorrhage vs sedation, currently on levo 0.04 and vaso 0.04, improving   - wean vasopressors per ICU protocol  - maintain MAP 65-70  - abx as below  - TTE 4/27 EF 64%, LAE, mild AR, mild MR    #Aortic calcification/?Bicuspid aortic valve  - Pt with aortic calcification on echo w/ possible bicuspid valve. +systolic murmur  - Echo w/o vegetation    PULM  # ARDS  - P/F reported to be 70 at [facility], reflecting severe ARDS. Now improved to 107  -- Peak pressure 36 w/ Plateau 32; PEEP decreased to 10 and subsequently peak pressure 32 and plateau 29  - POCUS w/ RLL consolidation. ARDS likely i/s/o PNA vs pneumonitis from hematemesis  - maintain lung protective ventilation, wean per ICU protocol  - Plan for proning today; pt with worsening UO; will place shiley for CVVH and then prone after  - duonebs q6    RENAL  # LUBA  - sCr on admit 1.86, baseline unclear. Now worsening 2.55. Pt with decreasing UO s/p bumex gtt and metalazone  - likely hepatorenal syndrome, vs ATN from shock  - Renal protective measures: Please renally dose all medications; Avoid nephrotoxic medications (NSAIDS, IV contrast); Avoid ACEi and ARBs in the setting of LUBA; Maintain MAP >65;   - indwelling geronimo replaced on admit 4/27 from OSH,   - c/w bumex gtt, increased to 1mg/hr  - monitor strict I&O  - albumin x2 days    GI  # hematemesis  - EGD at OSH noted blood clots in oropharynx, no obvious bleeding source, normal esophagus  - no epistaxis noted on exam  - 4/28 increased bloody output from OGT, GI contacted, considering EGD  - protonix 40 mg IVP BID  - strict NPO  - cbc q6    # acute liver failure  - send eval for liver injury: serum tox (tylenol level), hepatitis labs, unlikely shock liver.    - Last EtOH use: reportedly 4/20/24 per documentation from [facility]  - Smooth muscle ab 1:20   - MELD-Na: 33    - Variceal status: normal esophagus on EGD at OSH  - c/w NAC per hep, currently receiving 100mg/kg in 1000 mL 5% dextrose solution x4 days (4/28 at 1pm through 5/2 at 1pm) - REORDER DAILY  - GI following, will appreciate recs    ID  # SIRS+  - source unclear, possible pneumonia (minimal secretions), vs ?endocarditis vs UTI  - await urine and blood cultures. full RVP and MRSA swab pending   - s/p vanco and azithro, d/c iso neg legionella and mrsa  - c/w zosyn (4/27 - )  - preview images from OSH, can consider repeat CT CAP    ENDO  #Low TSH  - TSH 0.10  - f/u FT4, FT3    # HEME/ONC  # bicytopenia  # elevated INR  - likely iso liver failure vs acute bleed at OSH  - s/p 4 u prbc and 2 u FFP at OSH. received 1 u prbc, 1 u plt, and 1 u FFP on 4/27  - transfuse for hb <7, plt <50 for bleeding  - monitor fibrinogen QD    #Portal vein thrombosis  - Evidence of portal vein thrombosis on US abdomne  - hold AC at this time iso upper GIB    DERM  # jaundice  - likely iso elevated bilirubin, acute liver failure    #B/l LE edema  - f/u Va duplex b/l LE to r/o DVTs    DVT: hold for now given anemia/hematemesis, SCD  FENGI: NPO for now given GIB, will place OG tube   LDA: left radial a-line, right IJ  GTT: prop, fent, nimbex, levo, vaso  GOC: Full code for now, family: father Justice and has a brother   43M with hx of alcohol use disorder, restless leg syndrome, PVD, anxiety/PTSD ( service), PAD, taking adderall at home, presented as a transfer from [facility] ED for acute decompensated liver and respiratory failure and possible ECMO eval.    NEURO  # encephalopathy  - Pt encephalopathic on admission likely metabolic vs hepatic encephalopathy (ammonia 94). Intubated for airway protection  - currently sedated and paralyzed (on fent, prop, nimbex)  - Will add ketamine at 0.5mg, and titrate fent from 4.5mcg to 3mcg. Can increase ketamine to 1-2mg to titrate fentanyl now  - maintain RAAS -4 to -5 to maintain vent synchrony, wean per ICU protocol    #Seizures   - no documented history of seizures, appeared to be having seizure like activity during rounds 4/27  - s/o veeg negative  - monitor for now    CARDS  # shock  - likely vasoplegia iso sepsis vs hemorrhage vs sedation, currently on levo 0.04 and vaso 0.04, improving   - wean vasopressors per ICU protocol  - maintain MAP 65-70  - Start stress dose steroids, methylprednisolone 40mg q6  - abx as below  - TTE 4/27 EF 64%, LAE, mild AR, mild MR    #Aortic calcification/?Bicuspid aortic valve  - Pt with aortic calcification on echo w/ possible bicuspid valve. +systolic murmur  - Echo w/o vegetation    PULM  # ARDS  - P/F reported to be 70 at [facility], reflecting severe ARDS. Now improved to 107  -- Peak pressure 36 w/ Plateau 32; PEEP decreased to 10 and subsequently peak pressure 32 and plateau 29  - POCUS w/ RLL consolidation. ARDS likely i/s/o PNA vs pneumonitis from hematemesis  - maintain lung protective ventilation, wean per ICU protocol  - Plan for proning today; pt with worsening UO; will place shiley for CVVH and then prone after  - duonebs q6    RENAL  # LUBA  - sCr on admit 1.86, baseline unclear. Now worsening 2.55. Pt with decreasing UO s/p bumex gtt and metalazone  - likely hepatorenal syndrome, vs ATN from shock  --- s/p albumin x2 days, on octreotide and levophed  - Renal protective measures: Please renally dose all medications; Avoid nephrotoxic medications (NSAIDS, IV contrast); Avoid ACEi and ARBs in the setting of LUBA; Maintain MAP >65;   - indwelling geronimo replaced on admit 4/27 from OSH,   - c/w bumex gtt  - Nephro c/s for CVVH; to place shiley today  - monitor strict I&O    GI  # hematemesis  - EGD at OSH noted blood clots in oropharynx, no obvious bleeding source, normal esophagus  - no epistaxis noted on exam  - 4/28 increased bloody output from OGT, no bloody output currently  - Hepatology following, no plan for EGD today  - protonix 40 mg IVP BID  - NPO  - cbc q6    # acute liver failure  - likely i/s/o alcohol use; AST / ALT 2:1 ratio. Bilirubin 21   - Last EtOH use: reportedly 4/20/24 per documentation from [facility]  - Smooth muscle ab 1:20   - MELD-Na on admission: 33, trend    - Variceal status: normal esophagus on EGD at OSH  - Per hepatology can discontinue NAC  -- If pt improves, may be a candidate for transplant given high MELD per hepatology  - GI following, will appreciate recs    #Bowel regimen  - Pt without BM, will give relistor i/s/o fent use    ID  # Sepsis/septic shock  - POCUS w/ RLL consolidation, possible cause of infection  - UCx and BCx NGTD,   - s/p vanco and azithro, d/c iso neg legionella and mrsa  - c/w zosyn (4/27 - )  - Will send fungal work-up and start diflucan empirically per hepatology recommendation    ENDO  #Low TSH  - TSH 0.10, T4 WNL    HEME/ONC  # bicytopenia (anemia and thrombocytopenia)  # elevated INR  - likely iso liver failure vs acute bleed at OSH  - s/p 4 u prbc and 2 u FFP at OSH. received 1 u prbc, 1 u plt, and 1 u FFP on 4/27  - Plan for shiley placement and exchange of lines  -- Will give 2u FFP now and then 1u FFP during shiley placement and 1u plt during shiley palcement  - transfuse for hb <7, plt <50 for bleeding  - monitor fibrinogen QD  - Vitamin K 4/28, will give another vitamin k 10mg today 4/29    #Portal vein thrombosis  - Evidence of portal vein thrombosis on US abdomen  - hold AC at this time iso upper GIB    DERM  # jaundice  - likely iso elevated bilirubin, acute liver failure    #B/l LE edema  - VA duplex negative    DVT: hold for now given anemia/hematemesis, SCD  MARCELOI: NPO for now given GIB  LDA: left radial a-line, right IJ to be removed today; Will place L IJ and axillary a-line and R shiley  GTT: prop, fent, nimbex, levo, vaso, ketamine  GOC: Full code for now, family: father Justice and has a brother. Case discussed with father and updates given.

## 2024-04-29 NOTE — DIETITIAN INITIAL EVALUATION ADULT - PERTINENT MEDS FT
MEDICATIONS  (STANDING):  albuterol/ipratropium for Nebulization 3 milliLiter(s) Nebulizer every 6 hours  albuterol/ipratropium for Nebulization. 3 milliLiter(s) Nebulizer once  buMETAnide Infusion 2.5 mG/Hr (12.5 mL/Hr) IV Continuous <Continuous>  chlorhexidine 0.12% Liquid 15 milliLiter(s) Oral Mucosa every 12 hours  chlorhexidine 2% Cloths 1 Application(s) Topical daily  cisatracurium Infusion 2 MICROgram(s)/kG/Min (11.1 mL/Hr) IV Continuous <Continuous>  fentaNYL   Infusion..... 3 MICROgram(s)/kG/Hr (5.53 mL/Hr) IV Continuous <Continuous>  ketamine Infusion. 0.5 mG/kG/Hr (4.61 mL/Hr) IV Continuous <Continuous>  mupirocin 2% Ointment 1 Application(s) Both Nostrils every 12 hours  norepinephrine Infusion 0.14 MICROgram(s)/kG/Min (12.1 mL/Hr) IV Continuous <Continuous>  octreotide  Infusion 50 MICROgram(s)/Hr (10 mL/Hr) IV Continuous <Continuous>  pantoprazole  Injectable 40 milliGRAM(s) IV Push two times a day  petrolatum Ophthalmic Ointment 1 Application(s) Both EYES two times a day  piperacillin/tazobactam IVPB.. 3.375 Gram(s) IV Intermittent every 12 hours  propofol Infusion 49.946 MICROgram(s)/kG/Min (27.6 mL/Hr) IV Continuous <Continuous>  vasopressin Infusion 0.04 Unit(s)/Min (6 mL/Hr) IV Continuous <Continuous>    MEDICATIONS  (PRN):

## 2024-04-29 NOTE — DIETITIAN INITIAL EVALUATION ADULT - ETIOLOGY
In the context of acute [critical] illness with altered GI function (GI bleed with OGT to decompression/suction)

## 2024-04-29 NOTE — DIETITIAN INITIAL EVALUATION ADULT - NSICDXPASTMEDICALHX_GEN_ALL_CORE_FT
PAST MEDICAL HISTORY:  Alcohol abuse     Anxiety and depression     History of restless legs syndrome

## 2024-04-29 NOTE — PROGRESS NOTE ADULT - SUBJECTIVE AND OBJECTIVE BOX
Carlos Ponce  PGY-2 | Internal Medicine      INTERVAL HPI/OVERNIGHT EVENTS: Overnight, worsening UO. Bumex increased to 2.5    SUBJECTIVE:  Patient is intubated and sedated    OBJECTIVE:    VITAL SIGNS:  ICU Vital Signs Last 24 Hrs  T(C): 37.8 (29 Apr 2024 12:00), Max: 39.1 (29 Apr 2024 08:00)  T(F): 100.1 (29 Apr 2024 12:00), Max: 102.3 (29 Apr 2024 08:00)  HR: 99 (29 Apr 2024 13:00) (97 - 107)  BP: --  BP(mean): --  ABP: 113/54 (29 Apr 2024 13:00) (103/49 - 122/63)  ABP(mean): 72 (29 Apr 2024 13:00) (65 - 81)  RR: 28 (29 Apr 2024 13:00) (28 - 28)  SpO2: 92% (29 Apr 2024 13:00) (89% - 99%)    O2 Parameters below as of 29 Apr 2024 13:00      O2 Concentration (%): 100      Mode: AC/ CMV (Assist Control/ Continuous Mandatory Ventilation), RR (machine): 28, TV (machine): 420, FiO2: 100, PEEP: 12, ITime: 0.82, MAP: 18, PIP: 34    04-28 @ 07:01 - 04-29 @ 07:00  --------------------------------------------------------  IN: 4042.1 mL / OUT: 2090 mL / NET: 1952.1 mL    04-29 @ 07:01 - 04-29 @ 13:37  --------------------------------------------------------  IN: 1238.2 mL / OUT: 135 mL / NET: 1103.2 mL      CAPILLARY BLOOD GLUCOSE      POCT Blood Glucose.: 114 mg/dL (29 Apr 2024 05:25)      PHYSICAL EXAM:    General: NAD, sedated  HEENT: reactive pupils, scleral icterus  Neck: supple  Respiratory: upper airway breath sounds  Cardiovascular: systolic ejection murmur  Abdomen: soft, NT/ND; +BS x4  Extremities: WWP, 2+ peripheral pulses b/l; 1+ BLE edema  Skin: normal color and turgor; no rash  Neurological: sedated and paralyzed    MEDICATIONS:  MEDICATIONS  (STANDING):  albuterol/ipratropium for Nebulization 3 milliLiter(s) Nebulizer every 6 hours  albuterol/ipratropium for Nebulization. 3 milliLiter(s) Nebulizer once  buMETAnide Infusion 2.5 mG/Hr (12.5 mL/Hr) IV Continuous <Continuous>  chlorhexidine 0.12% Liquid 15 milliLiter(s) Oral Mucosa every 12 hours  chlorhexidine 2% Cloths 1 Application(s) Topical daily  cisatracurium Infusion 2 MICROgram(s)/kG/Min (11.1 mL/Hr) IV Continuous <Continuous>  CRRT Treatment    <Continuous>  fentaNYL   Infusion..... 3 MICROgram(s)/kG/Hr (5.53 mL/Hr) IV Continuous <Continuous>  folic acid 1 milliGRAM(s) Oral daily  ketamine Infusion. 0.5 mG/kG/Hr (4.61 mL/Hr) IV Continuous <Continuous>  methylnaltrexone Injectable 12 milliGRAM(s) SubCutaneous once  multivitamin 1 Tablet(s) Oral daily  mupirocin 2% Ointment 1 Application(s) Both Nostrils every 12 hours  norepinephrine Infusion 0.14 MICROgram(s)/kG/Min (12.1 mL/Hr) IV Continuous <Continuous>  octreotide  Infusion 50 MICROgram(s)/Hr (10 mL/Hr) IV Continuous <Continuous>  pantoprazole  Injectable 40 milliGRAM(s) IV Push two times a day  petrolatum Ophthalmic Ointment 1 Application(s) Both EYES two times a day  piperacillin/tazobactam IVPB.. 3.375 Gram(s) IV Intermittent every 12 hours  PrismaSATE Dialysate BK 0 / 3.5 5000 milliLiter(s) (2500 mL/Hr) CRRT <Continuous>  PrismaSOL Filtration BGK 0 / 2.5 5000 milliLiter(s) (1300 mL/Hr) CRRT <Continuous>  PrismaSOL Filtration BGK 0 / 2.5 5000 milliLiter(s) (200 mL/Hr) CRRT <Continuous>  propofol Infusion 49.946 MICROgram(s)/kG/Min (27.6 mL/Hr) IV Continuous <Continuous>  thiamine 100 milliGRAM(s) Oral daily  vasopressin Infusion 0.04 Unit(s)/Min (6 mL/Hr) IV Continuous <Continuous>    MEDICATIONS  (PRN):      ALLERGIES:  Allergies    Allergy Status Unknown    Intolerances        LABS:                        7.1    8.25  )-----------( 39       ( 29 Apr 2024 08:15 )             21.2     04-29    132<L>  |  95<L>  |  70<H>  ----------------------------<  97  4.1   |  17<L>  |  2.55<H>    Ca    8.5      29 Apr 2024 08:15  Phos  4.9     04-29  Mg     2.00     04-29    TPro  6.4  /  Alb  3.7  /  TBili  21.0<H>  /  DBili  x   /  AST  130<H>  /  ALT  39  /  AlkPhos  28<L>  04-29    PT/INR - ( 29 Apr 2024 02:17 )   PT: 38.1 sec;   INR: 3.53 ratio         PTT - ( 29 Apr 2024 02:17 )  PTT:39.3 sec  Urinalysis Basic - ( 29 Apr 2024 08:15 )    Color: x / Appearance: x / SG: x / pH: x  Gluc: 97 mg/dL / Ketone: x  / Bili: x / Urobili: x   Blood: x / Protein: x / Nitrite: x   Leuk Esterase: x / RBC: x / WBC x   Sq Epi: x / Non Sq Epi: x / Bacteria: x        RADIOLOGY & ADDITIONAL TESTS: Reviewed. Carlos Ponce  PGY-3 | Internal Medicine      INTERVAL HPI/OVERNIGHT EVENTS: Overnight, worsening UO. Bumex increased to 2.5    SUBJECTIVE:  Patient is intubated and sedated    OBJECTIVE:    VITAL SIGNS:  ICU Vital Signs Last 24 Hrs  T(C): 37.8 (29 Apr 2024 12:00), Max: 39.1 (29 Apr 2024 08:00)  T(F): 100.1 (29 Apr 2024 12:00), Max: 102.3 (29 Apr 2024 08:00)  HR: 99 (29 Apr 2024 13:00) (97 - 107)  BP: --  BP(mean): --  ABP: 113/54 (29 Apr 2024 13:00) (103/49 - 122/63)  ABP(mean): 72 (29 Apr 2024 13:00) (65 - 81)  RR: 28 (29 Apr 2024 13:00) (28 - 28)  SpO2: 92% (29 Apr 2024 13:00) (89% - 99%)    O2 Parameters below as of 29 Apr 2024 13:00      O2 Concentration (%): 100      Mode: AC/ CMV (Assist Control/ Continuous Mandatory Ventilation), RR (machine): 28, TV (machine): 420, FiO2: 100, PEEP: 12, ITime: 0.82, MAP: 18, PIP: 34    04-28 @ 07:01 - 04-29 @ 07:00  --------------------------------------------------------  IN: 4042.1 mL / OUT: 2090 mL / NET: 1952.1 mL    04-29 @ 07:01 - 04-29 @ 13:37  --------------------------------------------------------  IN: 1238.2 mL / OUT: 135 mL / NET: 1103.2 mL      CAPILLARY BLOOD GLUCOSE      POCT Blood Glucose.: 114 mg/dL (29 Apr 2024 05:25)      PHYSICAL EXAM:    General: NAD, sedated  HEENT: reactive pupils, scleral icterus  Neck: supple  Respiratory: upper airway breath sounds  Cardiovascular: systolic ejection murmur  Abdomen: soft, NT/ND; +BS x4  Extremities: WWP, 2+ peripheral pulses b/l; 1+ BLE edema  Skin: normal color and turgor; no rash  Neurological: sedated and paralyzed    MEDICATIONS:  MEDICATIONS  (STANDING):  albuterol/ipratropium for Nebulization 3 milliLiter(s) Nebulizer every 6 hours  albuterol/ipratropium for Nebulization. 3 milliLiter(s) Nebulizer once  buMETAnide Infusion 2.5 mG/Hr (12.5 mL/Hr) IV Continuous <Continuous>  chlorhexidine 0.12% Liquid 15 milliLiter(s) Oral Mucosa every 12 hours  chlorhexidine 2% Cloths 1 Application(s) Topical daily  cisatracurium Infusion 2 MICROgram(s)/kG/Min (11.1 mL/Hr) IV Continuous <Continuous>  CRRT Treatment    <Continuous>  fentaNYL   Infusion..... 3 MICROgram(s)/kG/Hr (5.53 mL/Hr) IV Continuous <Continuous>  folic acid 1 milliGRAM(s) Oral daily  ketamine Infusion. 0.5 mG/kG/Hr (4.61 mL/Hr) IV Continuous <Continuous>  methylnaltrexone Injectable 12 milliGRAM(s) SubCutaneous once  multivitamin 1 Tablet(s) Oral daily  mupirocin 2% Ointment 1 Application(s) Both Nostrils every 12 hours  norepinephrine Infusion 0.14 MICROgram(s)/kG/Min (12.1 mL/Hr) IV Continuous <Continuous>  octreotide  Infusion 50 MICROgram(s)/Hr (10 mL/Hr) IV Continuous <Continuous>  pantoprazole  Injectable 40 milliGRAM(s) IV Push two times a day  petrolatum Ophthalmic Ointment 1 Application(s) Both EYES two times a day  piperacillin/tazobactam IVPB.. 3.375 Gram(s) IV Intermittent every 12 hours  PrismaSATE Dialysate BK 0 / 3.5 5000 milliLiter(s) (2500 mL/Hr) CRRT <Continuous>  PrismaSOL Filtration BGK 0 / 2.5 5000 milliLiter(s) (1300 mL/Hr) CRRT <Continuous>  PrismaSOL Filtration BGK 0 / 2.5 5000 milliLiter(s) (200 mL/Hr) CRRT <Continuous>  propofol Infusion 49.946 MICROgram(s)/kG/Min (27.6 mL/Hr) IV Continuous <Continuous>  thiamine 100 milliGRAM(s) Oral daily  vasopressin Infusion 0.04 Unit(s)/Min (6 mL/Hr) IV Continuous <Continuous>    MEDICATIONS  (PRN):      ALLERGIES:  Allergies    Allergy Status Unknown    Intolerances        LABS:                        7.1    8.25  )-----------( 39       ( 29 Apr 2024 08:15 )             21.2     04-29    132<L>  |  95<L>  |  70<H>  ----------------------------<  97  4.1   |  17<L>  |  2.55<H>    Ca    8.5      29 Apr 2024 08:15  Phos  4.9     04-29  Mg     2.00     04-29    TPro  6.4  /  Alb  3.7  /  TBili  21.0<H>  /  DBili  x   /  AST  130<H>  /  ALT  39  /  AlkPhos  28<L>  04-29    PT/INR - ( 29 Apr 2024 02:17 )   PT: 38.1 sec;   INR: 3.53 ratio         PTT - ( 29 Apr 2024 02:17 )  PTT:39.3 sec  Urinalysis Basic - ( 29 Apr 2024 08:15 )    Color: x / Appearance: x / SG: x / pH: x  Gluc: 97 mg/dL / Ketone: x  / Bili: x / Urobili: x   Blood: x / Protein: x / Nitrite: x   Leuk Esterase: x / RBC: x / WBC x   Sq Epi: x / Non Sq Epi: x / Bacteria: x        RADIOLOGY & ADDITIONAL TESTS: Reviewed.

## 2024-04-29 NOTE — DIETITIAN INITIAL EVALUATION ADULT - REASON INDICATOR FOR ASSESSMENT
MST Score >2 (unsure of weight loss, eating poorly 2/2 decreased appetite).    Critical Care Length of Stay Nutrition Assessment

## 2024-04-29 NOTE — CONSULT NOTE ADULT - PROBLEM SELECTOR RECOMMENDATION 9
Patient with LUBA iso decompensated liver cirrhosis. Found to have Hb 4.9, received 4 units pRBC and 2 u FFP. Also thrombocytopenic with Plt<50 and dropping. Noted to have active bleeding during intubation. GI performed EGD with moderate amount of blood clots in oropharynx, no obvious bleeding source, normal esophagus, portal hypertensive gastropathy, hematin in gastric fundus. On laboratory evaluation, patient noted to have p/f 70 and high peak pressures with c/f ARDS. He is on Levo, Vaso, Octreotide, and Bumex gtts. Remains non-oliguric, but BUN/Cr continues to rise from 46/1.86 on admission to 70/2.55 on 4/29. Also acidotic with pH 7.26. Urine studies significant for very low Na<20 c/w HRS as well. Agree with Levo and Octreotide. Despite adequate UO, pt remains net positive 2L and in ARDS. Recommend CRRT for fluid removal and removal of ammonia (94). Consent obtained from the father and placed in chart. Will attempt to gradually increase UF rate as BP tolerates. Patient to get FFP prior to shiley placement per MICU team. Would also consider giving DDAVP 40mcg x1 30-60mins prior to placement given thrombocytopenia.      Jolanta Mata, DO  Nephrology Fellow  Feel free to contact me directly on TEAMS with any questions.  (After 5pm or on weekends, please call the on-call fellow). Patient with LUBA iso decompensated liver cirrhosis. Found to have Hb 4.9, received 4 units pRBC and 2 u FFP. Also thrombocytopenic with Plt<50 and dropping. Noted to have active bleeding during intubation. GI performed EGD with moderate amount of blood clots in oropharynx, no obvious bleeding source, normal esophagus, portal hypertensive gastropathy, hematin in gastric fundus. On laboratory evaluation, patient noted to have p/f 70 and high peak pressures with c/f ARDS. He is on Levo, Vaso, Octreotide, and Bumex gtts. Remains non-oliguric, but BUN/Cr continues to rise from 46/1.86 on admission to 70/2.55 on 4/29. Also acidotic with pH 7.26. Urine studies significant for very low Na<20 c/w HRS as well. Agree with Levo and Octreotide. Despite adequate UO, pt remains net positive 2L and in ARDS. Recommend CRRT for fluid removal and removal of ammonia (94). Consent obtained from the father and placed in chart. Will attempt to gradually increase UF rate as BP tolerates. Patient to get FFP prior to shiley placement per MICU team. Would also consider giving DDAVP 35mcg x1 30-60mins prior to placement given thrombocytopenia.      Jolanta Mata, DO  Nephrology Fellow  Feel free to contact me directly on TEAMS with any questions.  (After 5pm or on weekends, please call the on-call fellow). Patient with LUBA iso decompensated liver cirrhosis. Found to have Hb 4.9, received 4 units pRBC and 2 u FFP. Also thrombocytopenic with Plt<50 and dropping. Noted to have active bleeding during intubation. GI performed EGD with moderate amount of blood clots in oropharynx, no obvious bleeding source, normal esophagus, portal hypertensive gastropathy, hematin in gastric fundus. On laboratory evaluation, patient noted to have p/f 70 and high peak pressures with c/f ARDS. He is on Levo, Vaso, Octreotide, and Bumex gtts. Remains non-oliguric, but BUN/Cr continues to rise from 46/1.86 on admission to 70/2.55 on 4/29. Also acidotic with pH 7.26. Urine studies significant for very low Na<20 c/w HRS as well. Agree with Levo and Octreotide. Despite adequate UO, pt remains net positive 2L and in ARDS. Recommend CRRT for fluid removal and removal of ammonia (94). Consent obtained from the father and placed in chart. Will attempt to gradually increase UF rate as BP tolerates. Patient to get FFP prior to shiley placement per MICU team. Would also consider giving DDAVP 35mcg x1 30-60mins prior to placement given thrombocytopenia. Overall prognosis is guarded.      Jolanta Mata, DO  Nephrology Fellow  Feel free to contact me directly on TEAMS with any questions.  (After 5pm or on weekends, please call the on-call fellow).

## 2024-04-29 NOTE — DIETITIAN INITIAL EVALUATION ADULT - ENTERAL
--- If/when medically appropriate to initiate enteral nutrition, start VitalHP @ 10mL/hr then increase by 10mL q 4hrs, as tolerated, to goal of 60mL x 18hrs (11a-5a)  + 2 packets Liquid Protein Supplement (LPS) per day    provides:  1080mL total volume  1080 kcals (+ Propofol KCals)  94g protein (+ 30g additional protein via LPS)= 114g protein   902mL free water  --- If/when medically appropriate to initiate enteral nutrition, start VitalHP @ 10mL/hr then increase by 10mL q 4hrs, as tolerated, to goal of 60mL x 18hrs (11a-5a)  + 1 packets Liquid Protein Supplement (LPS) per day    provides:  1080mL total volume  1080 kcals (+ Propofol KCals)  94g protein (+ 15g additional protein via LPS)= 109g protein   902mL free water

## 2024-04-29 NOTE — PROGRESS NOTE ADULT - ASSESSMENT
Mr. Burns is a 43 year old male with alcohol use disorder, restless leg syndrome, PVD, anxiety/PTSD who arrived to [facility] as a transfer from [facility] for respiratory failure and ECMO evaluation in the context of liver failure. Patient presented to OSH on 4/25 for chest pain with a hospital course c/b hematemesis, respiratory failure and hepatic dysfunction.     #Acute on chronic liver failure (ACLF)  #Alcohol-related cirrhosis  #ARDS  #Hematemesis, resolved   #Normocytic anemia  #Thrombocytopenia  Patient with jaundice, coagulopathy and encephalopathy i/s/o alcohol use disorder and cirrhosis. Chronicity of underlying liver disease is unclear at this time. Suspect ACLF due to alcohol use. DDx includes Tylenol toxicity, viral illness, autoimmune hepatitis. MELD 3.0 score is 34>>41. Patient underwent EGD at OSH for hematemesis with findings of PHG, normal esophagus and hematin in the gastric fundus without actively bleeding source. Esophageal varices were not reported. Gastric varices are not entirely excluded.  Tylenol level and salicylate level were undetectable on 4/27. Acute viral hepatitis panel negative. HBcAb nonreactive. ASMA/AMA negative. UTox negative. HIV/CMV negative. Ceruloplasmin normal. Ferritin elevation not c/w hemochromatosis. TTE with severely dilated LV, mildly enlarged RV. MDF >80, however not a candidate for prednisone therapy for alcoholic hepatitis due to recent bleeding and critical illness. Patient remains intubated, ventilated and sedated on two vasopressors. RUQ US with hepatic steatosis and no ascites. CT at [facility] reports nodular liver and splenomegaly, likely cirrhotic. Doppler US with possible PVT, however not noted on CT from [facility].     Recommendations:  - Continue empiric antibiotics  - Complete infectious work-up including cultures  - Continue NAC @ 100mg/kg / 24 hours   - Octreotide, IV PPI BID  - Maintain active T&S and transfuse as needed  - Monitor for recurrent bleeding  - Okay for trickle feeds via OG tube  - Obtain OSH EGD report  - No anticoagulation for possible PVT on doppler as not detected on CT, patient with recent bleed and coagulopathy  - F/u viral etiologies: HEV IgM, HSV PCR, EBV PCR  - Trend CBC, CMP, INR, Mg, Phos  - elevate bed to 45 degrees   - Hemodynamic and respiratory support per ICU team  - Not a liver transplant candidate due to critical illness     Jurgen Ross MD  Gastroenterology/Hepatology Fellow  Available via Microsoft Teams  Pager: [phone]    On Weekdays after 5 PM to 8AM and Weekends/Holidays (All day)  For non-urgent consults, please email [email or web address] or [email or web address]  For urgent consults, please contact on call GI team.  See Amion schedule ([facility]), [employer] Games[employer] system - [phone] ([facility]), or call hospital  ([facility]) Mr. Burns is a 43 year old male with alcohol use disorder, restless leg syndrome, PVD, anxiety/PTSD who arrived to [facility] as a transfer from [facility] for respiratory failure and ECMO evaluation in the context of liver failure. Patient presented to OSH on 4/25 for chest pain with a hospital course c/b hematemesis, respiratory failure and hepatic dysfunction.     #Acute on chronic liver failure (ACLF)  #Alcohol-related cirrhosis  #ARDS  #Hematemesis, resolved   #Normocytic anemia  #Thrombocytopenia  Patient with jaundice, coagulopathy and encephalopathy i/s/o alcohol use disorder and cirrhosis. Chronicity of underlying liver disease is unclear at this time. Suspect ACLF due to alcohol use. DDx includes Tylenol toxicity, viral illness, autoimmune hepatitis. MELD 3.0 score is 34>>41. Patient underwent EGD at OSH for hematemesis with findings of PHG, normal esophagus and hematin in the gastric fundus without actively bleeding source. Esophageal varices were not reported. Gastric varices are not entirely excluded.  Tylenol level and salicylate level were undetectable on 4/27. Acute viral hepatitis panel negative. HBcAb nonreactive. ASMA/AMA negative. UTox negative. HIV/CMV negative. Ceruloplasmin normal. Ferritin elevation not c/w hemochromatosis. TTE with severely dilated LV, mildly enlarged RV. MDF >80, however not a candidate for prednisone therapy for alcoholic hepatitis due to recent bleeding and critical illness. Patient remains intubated, ventilated and sedated on two vasopressors. RUQ US with hepatic steatosis and no ascites. CT at [facility] reports nodular liver and splenomegaly, likely cirrhotic. Doppler US with possible PVT, however not noted on CT from [facility].     Recommendations:  - Continue Zosyn  - Add antifungal agent  - s/p Vanc and azithromycin  - Check fungitell   - f/u Cx  - Okay to administer stress dose steroids  - Discontinue NAC @ 100mg/kg / 24 hours   - Octreotide, IV PPI BID  - Maintain active T&S and transfuse as needed  - Monitor for recurrent bleeding  - Okay for trickle feeds via OG tube  - Obtain OSH EGD report  - No anticoagulation for possible PVT on doppler as not detected on CT, patient with recent bleed and coagulopathy  - F/u viral etiologies: HEV IgM, HSV PCR, EBV PCR  - Trend CBC, CMP, INR, Mg, Phos  - elevate bed to 45 degrees   - Hemodynamic and respiratory support per ICU team  - Not a liver transplant candidate due to critical illness; will re-assess candidacy pending patient's clinical response and extubation     Jurgen Ross MD  Gastroenterology/Hepatology Fellow  Available via Microsoft Teams  Pager: [phone]    On Weekdays after 5 PM to 8AM and Weekends/Holidays (All day)  For non-urgent consults, please email [email or web address] or [email or web address]  For urgent consults, please contact on call GI team.  See Amion schedule ([facility]), [employer] paging system - [phone] ([facility]), or call hospital  ([facility])

## 2024-04-29 NOTE — PROGRESS NOTE ADULT - SUBJECTIVE AND OBJECTIVE BOX
Interval Events:   Febrile overnight  Remains on multiple vasopressors, intubated, sedated and paralyzed   Decreased OGT output per RN. Hgb stable  MELD rising to 41    Hospital Medications:  albuterol/ipratropium for Nebulization 3 milliLiter(s) Nebulizer every 6 hours  albuterol/ipratropium for Nebulization. 3 milliLiter(s) Nebulizer once  buMETAnide Infusion 2.5 mG/Hr IV Continuous <Continuous>  chlorhexidine 0.12% Liquid 15 milliLiter(s) Oral Mucosa every 12 hours  chlorhexidine 2% Cloths 1 Application(s) Topical daily  cisatracurium Infusion 2 MICROgram(s)/kG/Min IV Continuous <Continuous>  fentaNYL   Infusion. 3 MICROgram(s)/kG/Hr IV Continuous <Continuous>  mupirocin 2% Ointment 1 Application(s) Both Nostrils every 12 hours  norepinephrine Infusion 0.14 MICROgram(s)/kG/Min IV Continuous <Continuous>  octreotide  Infusion 50 MICROgram(s)/Hr IV Continuous <Continuous>  pantoprazole  Injectable 40 milliGRAM(s) IV Push two times a day  piperacillin/tazobactam IVPB.. 3.375 Gram(s) IV Intermittent every 12 hours  propofol Infusion 49.946 MICROgram(s)/kG/Min IV Continuous <Continuous>  vasopressin Infusion 0.04 Unit(s)/Min IV Continuous <Continuous>    ROS: See above.    PHYSICAL EXAM:   Vital Signs:  Vital Signs Last 24 Hrs  T(C): 39.1 (29 Apr 2024 08:00), Max: 39.1 (29 Apr 2024 08:00)  T(F): 102.3 (29 Apr 2024 08:00), Max: 102.3 (29 Apr 2024 08:00)  HR: 98 (29 Apr 2024 09:44) (97 - 112)  BP: --  BP(mean): --  RR: 28 (29 Apr 2024 09:00) (28 - 28)  SpO2: 98% (29 Apr 2024 09:44) (89% - 98%)    Parameters below as of 29 Apr 2024 09:44  Patient On (Oxygen Delivery Method): ventilator    GENERAL:  Intubated, sedated, paralyzed   HEENT: OG tube in place  CHEST:  MV via ETT  HEART:  on vasopressors  ABDOMEN:  Soft, distended  EXTREMITIES:  1-2+ pitting edema b/L LE. Ecchymosis noted on RLE.   SKIN:  Warm & Dry. Jaundiced.   NEURO:  sedated    LABS:                        7.1    8.25  )-----------( 39       ( 29 Apr 2024 08:15 )             21.2     Mean Cell Volume: 92.2 fL (04-29-24 @ 08:15)    04-29    132<L>  |  95<L>  |  70<H>  ----------------------------<  97  4.1   |  17<L>  |  2.55<H>    Ca    8.5      29 Apr 2024 08:15  Phos  4.9     04-29  Mg     2.00     04-29    TPro  6.4  /  Alb  3.7  /  TBili  21.0<H>  /  DBili  x   /  AST  130<H>  /  ALT  39  /  AlkPhos  28<L>  04-29    LIVER FUNCTIONS - ( 29 Apr 2024 08:15 )  Alb: 3.7 g/dL / Pro: 6.4 g/dL / ALK PHOS: 28 U/L / ALT: 39 U/L / AST: 130 U/L / GGT: x           PT/INR - ( 29 Apr 2024 02:17 )   PT: 38.1 sec;   INR: 3.53 ratio         PTT - ( 29 Apr 2024 02:17 )  PTT:39.3 sec

## 2024-04-29 NOTE — PROGRESS NOTE ADULT - ATTENDING COMMENTS
Mr. Burns, a 43-year-old male with alcohol use disorder, restless leg syndrome, peripheral vascular disease (PVD), anxiety/PTSD, and [facility] due to liver failure. His hospital course at OSH included chest pain, hematemesis, respiratory failure, and hepatic dysfunction. He presents with jaundice, coagulopathy, and encephalopathy, suspected to be alcohol-related cirrhosis leading to acute on chronic liver failure (ACLF). Initial MELD score was 34, rising to 41. EGD findings revealed portal hypertensive gastropathy (PHG) without active bleeding, with unclear esophageal varices status. Imaging showed nodular liver consistent with cirrhosis and possible portal vein thrombosis (PVT) noted on Doppler US. Note patient on multiple IV pressors.    Recommendations include: Broad spectrum antimicrobial coverage, add antifungal agents (suggest IV Diflucan), Ok to use stress dose steroids if needed, and supportive care for bleeding risk and liver function. Liver transplant candidacy is deferred pending clinical improvement. Monitoring includes CBC, CMP, INR, magnesium, and phosphorus, alongside respiratory and hemodynamic support in the ICU setting.

## 2024-04-29 NOTE — PROCEDURE NOTE - NSPOSTPRCRAD_GEN_A_CORE
central line located in the
central line located in the/central line located in the superior vena cava

## 2024-04-29 NOTE — DIETITIAN INITIAL EVALUATION ADULT - NSFNSGIIOFT_GEN_A_CORE
04-28-24 @ 07:01  -  04-29-24 @ 07:00  --------------------------------------------------------  OUT:    Nasogastric/Oral tube (mL): 850 mL  Total OUT: 850 mL    Total NET: -850 mL

## 2024-04-30 NOTE — PROGRESS NOTE ADULT - PROBLEM SELECTOR PLAN 1
Patient with LUBA iso decompensated liver cirrhosis. Found to be anemic and thrombocytopenic. No obvious bleeding source on EGD. Patient developed ARDS due to liver failure and receiving multiple blood products, remains intubated. He is on Levo, Vaso, Octreotide, and Bumex gtts. Was non-oliguric, but BUN/Cr continued to rise from 46/1.86 on admission to 70/2.55 on 4/29. Also was acidotic with pH 7.26. Urine studies significant for very low Na<20 c/w HRS as well. Agree with Levo and Octreotide. Despite adequate UO, pt remained net positive 2L and in ARDS. Therefore, CRRT initiated on 4/29 for fluid removal and removal of ammonia (94). Consent obtained from the father. Tolerating UF 100mL/h without increasing pressor requirements, much less net positive today and FIO2 requirements have improved a bit. Will continue CRRT.  However, overall prognosis remains guarded.      Jolanta Mata, DO  Nephrology Fellow  Feel free to contact me directly on TEAMS with any questions.  (After 5pm or on weekends, please call the on-call fellow).

## 2024-04-30 NOTE — PROGRESS NOTE ADULT - ATTENDING COMMENTS
A 43-year-old male with alcohol use disorder, restless leg syndrome, peripheral vascular disease, and anxiety/PTSD was transferred to [facility] from [facility] for respiratory failure and ECMO evaluation, associated with acute on chronic liver failure (ACLF) likely due to alcohol-related cirrhosis. He presented initially with chest pain and subsequently developed hematemesis, respiratory failure, and hepatic dysfunction.    Diagnostic findings include jaundice, coagulopathy, and encephalopathy. Imaging shows hepatic steatosis, nodular liver, and splenomegaly, suggestive of cirrhosis with possible portal vein thrombosis (PVT). EGD revealed portal hypertensive gastropathy (PHG) without active bleeding sources or varices.    I agree with the management recommendations as outlined in Fellow's note including broad spectrum antibiotic and antifungal therapy, stress-dose steroids, and proton pump inhibitors (PPIs) for gastrointestinal protection. Could discontinue octreotide since EGD did not reveal any variceal bleeding. Ongoing hemodynamic and respiratory support per ICU team. Liver transplant candidacy is deferred due to critical illness, pending clinical improvement and extubation.

## 2024-04-30 NOTE — PROGRESS NOTE ADULT - ATTENDING COMMENTS
1. Acute hypoxemic respiratory failure secondary to ARDS. POCUS reveals  enlarging consolidations Pt proned this am. FIO2 60% 10 peep. bilaterally. Pt remains on FIO2 100%. and PEEP 12.Plateau pressure 37-38.  Pt is responsive to recruitment maneuvers.  Decrease PEEP 10 . Plateau 29. Will prone patient.    2. Renal. Acute renal failure  due to ATN vs hepatorenal failure. Pt not responding well to Bumex drip. Positive fluid balance > 2 liters last 24 hrs.     Will start CVVHD.    3. GI/Liver:  Decompensated ETOH induced liver cirrhosis. Pt had EGD for Gi bleed. No active bleeding.  Continue Octreotide,  and PPI.   Translate     4. Hypotension: Pt remains dependent on  Levophed and Vasopressin.  Titrate for MAP> 60. Trial of stress steroids.    5.  DVT prophylaxis: SCD    6. GOC: DNR.    ID. Continue Zosyn for pneumonia. 1. Acute hypoxemic respiratory failure secondary to ARDS. POCUS reveals  enlarging consolidations Pt proned this am. FIO2 60% 10 peep.  Plateau 27.  RV now longer under stress . Smaller than LV. Pt supined this afternoon. Tolerating FIO2 50% and 10 peep. Will keep supined.     2. Renal. Acute renal failure  due to ATN vs hepatorenal failure. Conitnue CVVHD. Removing 100cc/hr.     3. GI/Liver:  Decompensated ETOH induced liver cirrhosis. Pt had EGD for Gi bleed. No active bleeding.  Continue Octreotide,  and PPI.   Translate     4. Hypotension: Pt remains dependent on  Levophed and Vasopressin. Pressors decreasing.   Titrate for MAP> 60. Trial of stress steroids.    5.  DVT prophylaxis: SCD    6. GOC: DNR.    ID. Continue Zosyn for pneumonia.

## 2024-04-30 NOTE — PROGRESS NOTE ADULT - ASSESSMENT
43M with hx of alcohol use disorder and PAD, presented as a transfer from [facility] for acute decompensated liver and respiratory failure and possible ECMO eval. Nephrology service consulted for LUBA and volume overload.

## 2024-04-30 NOTE — PROGRESS NOTE ADULT - SUBJECTIVE AND OBJECTIVE BOX
Interval Events:   Patient proned  CVVHD initiated   MELD rising to 44     Hospital Medications:  albuterol/ipratropium for Nebulization 3 milliLiter(s) Nebulizer every 6 hours  chlorhexidine 0.12% Liquid 15 milliLiter(s) Oral Mucosa every 12 hours  chlorhexidine 2% Cloths 1 Application(s) Topical daily  cisatracurium Infusion 2 MICROgram(s)/kG/Min IV Continuous <Continuous>  CRRT Treatment    <Continuous>  fentaNYL   Infusion..... 3 MICROgram(s)/kG/Hr IV Continuous <Continuous>  fluconAZOLE IVPB 400 milliGRAM(s) IV Intermittent every 24 hours  folic acid 1 milliGRAM(s) Oral daily  ketamine Infusion. 0.5 mG/kG/Hr IV Continuous <Continuous>  multivitamin 1 Tablet(s) Oral daily  mupirocin 2% Ointment 1 Application(s) Both Nostrils every 12 hours  norepinephrine Infusion 0.14 MICROgram(s)/kG/Min IV Continuous <Continuous>  octreotide  Infusion 50 MICROgram(s)/Hr IV Continuous <Continuous>  pantoprazole  Injectable 40 milliGRAM(s) IV Push two times a day  petrolatum Ophthalmic Ointment 1 Application(s) Both EYES two times a day  piperacillin/tazobactam IVPB.. 4.5 Gram(s) IV Intermittent every 8 hours  PrismaSATE Dialysate BK 0 / 3.5 5000 milliLiter(s) CRRT <Continuous>  PrismaSOL Filtration BGK 0 / 2.5 5000 milliLiter(s) CRRT <Continuous>  PrismaSOL Filtration BGK 0 / 2.5 5000 milliLiter(s) CRRT <Continuous>  propofol Infusion 49.946 MICROgram(s)/kG/Min IV Continuous <Continuous>  thiamine 100 milliGRAM(s) Oral daily  vasopressin Infusion 0.04 Unit(s)/Min IV Continuous <Continuous>    ROS: unable to obtain    PHYSICAL EXAM:   Vital Signs:  Vital Signs Last 24 Hrs  T(C): 35.4 (30 Apr 2024 08:00), Max: 37.9 (29 Apr 2024 11:20)  T(F): 95.7 (30 Apr 2024 08:00), Max: 100.2 (29 Apr 2024 11:20)  HR: 79 (30 Apr 2024 08:33) (74 - 104)  BP: --  BP(mean): --  RR: 28 (30 Apr 2024 08:00) (28 - 28)  SpO2: 100% (30 Apr 2024 08:33) (82% - 100%)    Parameters below as of 30 Apr 2024 08:00  Patient On (Oxygen Delivery Method): ventilator    O2 Concentration (%): 70    GENERAL:  intubated, sedated, paralyzed   CHEST:  MV via ETT  CARDIAC:  on multiple vasopressors  ABDOMEN:  Soft, non-distended  SKIN:  jaundiced  NEURO:  sedated, paralyzed     LABS:                        7.7    5.22  )-----------( 38       ( 30 Apr 2024 06:10 )             23.0     Mean Cell Volume: 90.2 fL (04-30-24 @ 06:10)    04-30    134<L>  |  93<L>  |  44<H>  ----------------------------<  114<H>  3.5   |  18<L>  |  1.72<H>    Ca    9.4      30 Apr 2024 06:10  Phos  5.3     04-30  Mg     2.00     04-30    TPro  6.6  /  Alb  3.9  /  TBili  22.9<H>  /  DBili  x   /  AST  124<H>  /  ALT  39  /  AlkPhos  39<L>  04-30    LIVER FUNCTIONS - ( 30 Apr 2024 06:10 )  Alb: 3.9 g/dL / Pro: 6.6 g/dL / ALK PHOS: 39 U/L / ALT: 39 U/L / AST: 124 U/L / GGT: x           PT/INR - ( 30 Apr 2024 06:10 )   PT: 38.3 sec;   INR: 3.52 ratio         PTT - ( 30 Apr 2024 06:10 )  PTT:38.3 sec   Interval Events:   Patient proned  CVVHD initiated   MELD rising to 44     Hospital Medications:  albuterol/ipratropium for Nebulization 3 milliLiter(s) Nebulizer every 6 hours  chlorhexidine 0.12% Liquid 15 milliLiter(s) Oral Mucosa every 12 hours  chlorhexidine 2% Cloths 1 Application(s) Topical daily  cisatracurium Infusion 2 MICROgram(s)/kG/Min IV Continuous <Continuous>  CRRT Treatment    <Continuous>  fentaNYL   Infusion..... 3 MICROgram(s)/kG/Hr IV Continuous <Continuous>  fluconAZOLE IVPB 400 milliGRAM(s) IV Intermittent every 24 hours  folic acid 1 milliGRAM(s) Oral daily  ketamine Infusion. 0.5 mG/kG/Hr IV Continuous <Continuous>  multivitamin 1 Tablet(s) Oral daily  mupirocin 2% Ointment 1 Application(s) Both Nostrils every 12 hours  norepinephrine Infusion 0.14 MICROgram(s)/kG/Min IV Continuous <Continuous>  octreotide  Infusion 50 MICROgram(s)/Hr IV Continuous <Continuous>  pantoprazole  Injectable 40 milliGRAM(s) IV Push two times a day  petrolatum Ophthalmic Ointment 1 Application(s) Both EYES two times a day  piperacillin/tazobactam IVPB.. 4.5 Gram(s) IV Intermittent every 8 hours  PrismaSATE Dialysate BK 0 / 3.5 5000 milliLiter(s) CRRT <Continuous>  PrismaSOL Filtration BGK 0 / 2.5 5000 milliLiter(s) CRRT <Continuous>  PrismaSOL Filtration BGK 0 / 2.5 5000 milliLiter(s) CRRT <Continuous>  propofol Infusion 49.946 MICROgram(s)/kG/Min IV Continuous <Continuous>  thiamine 100 milliGRAM(s) Oral daily  vasopressin Infusion 0.04 Unit(s)/Min IV Continuous <Continuous>    ROS: unable to obtain    PHYSICAL EXAM:   Vital Signs:  Vital Signs Last 24 Hrs  T(C): 35.4 (30 Apr 2024 08:00), Max: 37.9 (29 Apr 2024 11:20)  T(F): 95.7 (30 Apr 2024 08:00), Max: 100.2 (29 Apr 2024 11:20)  HR: 79 (30 Apr 2024 08:33) (74 - 104)  BP: --  BP(mean): --  RR: 28 (30 Apr 2024 08:00) (28 - 28)  SpO2: 100% (30 Apr 2024 08:33) (82% - 100%)    Parameters below as of 30 Apr 2024 08:00  Patient On (Oxygen Delivery Method): ventilator    O2 Concentration (%): 70    GENERAL:  intubated, sedated, paralyzed, proned  CHEST:  MV via ETT  CARDIAC:  on multiple vasopressors  ABDOMEN:  Soft, non-distended  SKIN:  jaundiced  NEURO:  sedated, paralyzed     LABS:                        7.7    5.22  )-----------( 38       ( 30 Apr 2024 06:10 )             23.0     Mean Cell Volume: 90.2 fL (04-30-24 @ 06:10)    04-30    134<L>  |  93<L>  |  44<H>  ----------------------------<  114<H>  3.5   |  18<L>  |  1.72<H>    Ca    9.4      30 Apr 2024 06:10  Phos  5.3     04-30  Mg     2.00     04-30    TPro  6.6  /  Alb  3.9  /  TBili  22.9<H>  /  DBili  x   /  AST  124<H>  /  ALT  39  /  AlkPhos  39<L>  04-30    LIVER FUNCTIONS - ( 30 Apr 2024 06:10 )  Alb: 3.9 g/dL / Pro: 6.6 g/dL / ALK PHOS: 39 U/L / ALT: 39 U/L / AST: 124 U/L / GGT: x           PT/INR - ( 30 Apr 2024 06:10 )   PT: 38.3 sec;   INR: 3.52 ratio         PTT - ( 30 Apr 2024 06:10 )  PTT:38.3 sec

## 2024-04-30 NOTE — CHART NOTE - NSCHARTNOTEFT_GEN_A_CORE
: Princess Dennis     INDICATION: critical illness, acute hypoxemic respiratory failure     PROCEDURE:  [ x ] LIMITED ECHO  [ x ] LIMITED CHEST  [ ] LIMITED RETROPERITONEAL  [ ] LIMITED ABDOMINAL  [ ] LIMITED DVT  [ ] NEEDLE GUIDANCE VASCULAR  [ ] NEEDLE GUIDANCE THORACENTESIS  [ ] NEEDLE GUIDANCE PARACENTESIS  [ ] NEEDLE GUIDANCE PERICARDIOCENTESIS  [ ] OTHER    FINDINGS:  Limited ECHO while prone. Apical four chamber view with normal LV function.   Bilateral lung fields with B line predominant pattern. No anechoic spaces noted on bases.     INTERPRETATION:    Abnormal aeration pattern in bilateral lung fields. No obvious pleural effusion.     Performed with Dr. Fragoso at bedside. : Celio Donato    INDICATION: critical illness, acute hypoxemic respiratory failure     PROCEDURE:  [ x ] LIMITED ECHO  [ x ] LIMITED CHEST  [ ] LIMITED RETROPERITONEAL  [ ] LIMITED ABDOMINAL  [ ] LIMITED DVT  [ ] NEEDLE GUIDANCE VASCULAR  [ ] NEEDLE GUIDANCE THORACENTESIS  [ ] NEEDLE GUIDANCE PARACENTESIS  [ ] NEEDLE GUIDANCE PERICARDIOCENTESIS  [ ] OTHER    FINDINGS:  Limited ECHO while prone. Apical four chamber view with normal LV function.   Bilateral lung fields with B line predominant pattern. No anechoic spaces noted on bases.     INTERPRETATION:  RV < LV today.  Abnormal aeration pattern in bilateral lung fields. No obvious pleural effusion.     Performed with Dr. Fragoso at bedside.    I have assisted and supervised entire procedure.    Celio Fragoso MD

## 2024-04-30 NOTE — PROGRESS NOTE ADULT - ASSESSMENT
Mr. Burns is a 43 year old male with alcohol use disorder, restless leg syndrome, PVD, anxiety/PTSD who [facility] for respiratory failure and ECMO evaluation in the context of liver failure. Patient presented to OSH on 4/25 for chest pain with a hospital course c/b hematemesis, respiratory failure and hepatic dysfunction.     #Acute on chronic liver failure (ACLF)  #Alcohol-related cirrhosis  #ARDS  #Multi-organ failure   #Hematemesis, resolved   Patient with jaundice, coagulopathy and encephalopathy i/s/o alcohol use disorder and cirrhosis. Chronicity of underlying liver disease is unclear at this time. Suspect ACLF due to alcoholic hepatitis and underlying cirrhosis. RUQ US with hepatic steatosis and no ascites. CT at [facility] reports nodular liver and splenomegaly, likely cirrhotic. Doppler US with possible PVT, however not noted on CT from [facility].  MELD 3.0 score is 34>>44. Patient underwent EGD at OSH for hematemesis with findings of PHG, normal esophagus and hematin in the gastric fundus without actively bleeding source. Esophageal varices were not reported. Gastric varices are not entirely excluded.  Tylenol level and salicylate level were undetectable on 4/27. Acute viral hepatitis panel negative. HBcAb nonreactive. ASMA/AMA negative. UTox negative. HIV/CMV/EBV negative. Ceruloplasmin normal. Ferritin elevation not c/w hemochromatosis. TTE with severely dilated LV, mildly enlarged RV. MDF >80, however not a candidate for prednisone therapy for alcoholic hepatitis due to recent bleeding and critical illness. Patient remains intubated, ventilated and sedated on multiple vasopressors. Now s/p proning and initiation of CVVHD 4/29.  s/p Vanc and azithromycin.     Recommendations:  - Continue Zosyn & antifungal agent  - Check fungitell, f/u Cx  - Okay to administer stress dose steroids  - Octreotide, IV PPI BID  - Monitor for recurrent bleeding  - Maintain active T&S and transfuse blood products as needed  - Obtain OSH EGD report  - No anticoagulation for possible PVT noted on doppler as not detected on CT, patient with recent bleed and ongoing coagulopathy  - F/u viral etiologies: HEV IgM, HSV PCR  - Trend CBC, CMP, INR, Mg, Phos  - Hemodynamic and respiratory support per ICU team  - CRRT per Nephrology  - Not a liver transplant candidate due to critical illness; will re-assess candidacy pending patient's clinical response and extubation     Jurgen Ross MD  Gastroenterology/Hepatology Fellow  Available via [employer] Teams  Pager: [phone]    On Weekdays after 5 PM to 8AM and Weekends/Holidays (All day)  For non-urgent consults, please email [email or web address] or [email or web address]  For urgent consults, please contact on call GI team.  See Deborah schedule ([facility]), [employer] system - [phone] ([facility]), or call hospital  ([facility]) Mr. Burns is a 43 year old male with alcohol use disorder, restless leg syndrome, PVD, anxiety/PTSD who arrived to [facility] as a transfer from [facility] for respiratory failure and ECMO evaluation in the context of liver failure. Patient presented to OSH on 4/25 for chest pain with a hospital course c/b hematemesis, respiratory failure and hepatic dysfunction.     #Acute on chronic liver failure (ACLF)  #Alcohol-related cirrhosis  #ARDS  #Multi-organ failure   #Hematemesis, resolved   Patient with jaundice, coagulopathy and encephalopathy i/s/o alcohol use disorder and cirrhosis. Chronicity of underlying liver disease is unclear at this time. Suspect ACLF due to alcoholic hepatitis and underlying cirrhosis. RUQ US with hepatic steatosis and no ascites. CT at [facility] reports nodular liver and splenomegaly, likely cirrhotic. Doppler US with possible PVT, however not noted on CT from [facility].  MELD 3.0 score is 34>>44. Patient underwent EGD at OSH for hematemesis with findings of PHG, normal esophagus and hematin in the gastric fundus without actively bleeding source. Esophageal varices were not reported. Gastric varices are not entirely excluded.  Tylenol level and salicylate level were undetectable on 4/27. Acute viral hepatitis panel negative. HBcAb nonreactive. ASMA/AMA negative. UTox negative. HIV/CMV/EBV negative. Ceruloplasmin normal. Ferritin elevation not c/w hemochromatosis. TTE with severely dilated LV, mildly enlarged RV. MDF >80, however not a candidate for prednisone therapy for alcoholic hepatitis due to recent bleeding and critical illness. Patient remains intubated, ventilated and sedated on multiple vasopressors. Now s/p proning and initiation of CVVHD 4/29.  SUNDAY-C ACLF Score is 67 (calculated 4/30), predicting an 82.2% probability of death at 1 month.    Recommendations:  - Continue Zosyn & antifungal agent  - Check fungitell, f/u Cx  - Okay to administer stress dose steroids  - Octreotide, IV PPI BID  - Monitor for recurrent bleeding  - Maintain active T&S and transfuse blood products as needed  - Obtain OSH EGD report  - No anticoagulation for possible PVT noted on doppler as not detected on CT, patient with recent bleed and ongoing coagulopathy  - F/u viral etiologies: HEV IgM, HSV PCR  - Trend CBC, CMP, INR, Mg, Phos  - Hemodynamic and respiratory support per ICU team  - CRRT per Nephrology  - Not a liver transplant candidate due to critical illness; will re-assess candidacy pending patient's clinical response and extubation     Jurgen Ross MD  Gastroenterology/Hepatology Fellow  Available via Microsoft Teams  Pager: [phone]    On Weekdays after 5 PM to 8AM and Weekends/Holidays (All day)  For non-urgent consults, please email [email or web address] or [email or web address]  For urgent consults, please contact on call GI team.  See Amion schedule ([facility]), [employer] paging system - [phone] ([facility]), or call hospital  ([facility])

## 2024-04-30 NOTE — PROGRESS NOTE ADULT - ATTENDING COMMENTS
Patient seen and examined with Fellow, agree with A/P as above.  Patient in prone position, on CRRT, with FiO2 of 0.7 today (down from 1.0).  Continued pressors but no increase in requirements.  Remains with total body volume overload.  Continue CRRT, increase UF as tolerated.

## 2024-04-30 NOTE — PROGRESS NOTE ADULT - SUBJECTIVE AND OBJECTIVE BOX
[facility] DIVISION OF KIDNEY DISEASES AND HYPERTENSION   FOLLOW UP NOTE    --------------------------------------------------------------------------------    SUBJECTIVE / ROS / INTERVAL EVENTS:  - Patient seen and examined at bedside  - No overnight events, remains critically ill      PAST HISTORY  --------------------------------------------------------------------------------  No significant changes to PMH, PSH, FHx, SHx, unless otherwise noted    ALLERGIES & MEDICATIONS  --------------------------------------------------------------------------------  Allergies    Allergy Status Unknown    Intolerances      Standing Inpatient Medications  albuterol/ipratropium for Nebulization 3 milliLiter(s) Nebulizer every 6 hours  chlorhexidine 0.12% Liquid 15 milliLiter(s) Oral Mucosa every 12 hours  chlorhexidine 2% Cloths 1 Application(s) Topical daily  cisatracurium Infusion 2 MICROgram(s)/kG/Min IV Continuous <Continuous>  CRRT Treatment    <Continuous>  fentaNYL   Infusion..... 3 MICROgram(s)/kG/Hr IV Continuous <Continuous>  fluconAZOLE IVPB 400 milliGRAM(s) IV Intermittent every 24 hours  folic acid 1 milliGRAM(s) Oral daily  ketamine Infusion. 0.5 mG/kG/Hr IV Continuous <Continuous>  multivitamin 1 Tablet(s) Oral daily  mupirocin 2% Ointment 1 Application(s) Both Nostrils every 12 hours  norepinephrine Infusion 0.14 MICROgram(s)/kG/Min IV Continuous <Continuous>  octreotide  Infusion 50 MICROgram(s)/Hr IV Continuous <Continuous>  pantoprazole  Injectable 40 milliGRAM(s) IV Push two times a day  petrolatum Ophthalmic Ointment 1 Application(s) Both EYES two times a day  piperacillin/tazobactam IVPB.. 4.5 Gram(s) IV Intermittent every 8 hours  PrismaSATE Dialysate BK 0 / 3.5 5000 milliLiter(s) CRRT <Continuous>  PrismaSOL Filtration BGK 0 / 2.5 5000 milliLiter(s) CRRT <Continuous>  PrismaSOL Filtration BGK 0 / 2.5 5000 milliLiter(s) CRRT <Continuous>  propofol Infusion 49.946 MICROgram(s)/kG/Min IV Continuous <Continuous>  thiamine 100 milliGRAM(s) Oral daily  vasopressin Infusion 0.04 Unit(s)/Min IV Continuous <Continuous>    PRN Inpatient Medications      VITALS  --------------------------------------------------------------------------------  T(C): 35.4 (04-30-24 @ 08:00), Max: 37.9 (04-29-24 @ 11:20)  HR: 79 (04-30-24 @ 10:00) (74 - 104)  BP: --  RR: 28 (04-30-24 @ 10:00) (28 - 28)  SpO2: 100% (04-30-24 @ 10:00) (82% - 100%)  Wt(kg): --      04-29-24 @ 07:01  -  04-30-24 @ 07:00  --------------------------------------------------------  IN: 3149.1 mL / OUT: 2908 mL / NET: 241.1 mL    04-30-24 @ 07:01  -  04-30-24 @ 10:23  --------------------------------------------------------  IN: 268.3 mL / OUT: 409 mL / NET: -140.7 mL      PHYSICAL EXAM:  General: proned, intubated, on pressors  Neuro: sedated  Pulmonary: coarse breath sounds, vented  Cardiovascular/Chest: +S1S2  : juanpablo  Extremities: ++LE edema  Skin: Warm and dry, +jaundice  Dialysis access: RIJ non-tunneled cath    LABS/STUDIES  --------------------------------------------------------------------------------              7.7    5.22  >-----------<  38       [04-30-24 @ 06:10]              23.0     134  |  93  |  44  ----------------------------<  114      [04-30-24 @ 06:10]  3.5   |  18  |  1.72        Ca     9.4     [04-30-24 @ 06:10]      iCa    1.24     [04-30 @ 06:10]      Mg     2.00     [04-30-24 @ 06:10]      Phos  5.3     [04-30-24 @ 06:10]    TPro  6.6  /  Alb  3.9  /  TBili  22.9  /  DBili  x   /  AST  124  /  ALT  39  /  AlkPhos  39  [04-30-24 @ 06:10]    PT/INR: PT 38.3 , INR 3.52       [04-30-24 @ 06:10]  PTT: 38.3       [04-30-24 @ 06:10]      Creatinine Trend:  SCr 1.72 [04-30 @ 06:10]  SCr 2.05 [04-30 @ 00:20]  SCr 2.82 [04-29 @ 16:15]  SCr 2.55 [04-29 @ 08:15]  SCr 2.25 [04-29 @ 02:17]    Urinalysis - [04-30-24 @ 06:10]      Color  / Appearance  / SG  / pH       Gluc 114 / Ketone   / Bili  / Urobili        Blood  / Protein  / Leuk Est  / Nitrite       RBC  / WBC  / Hyaline  / Gran  / Sq Epi  / Non Sq Epi  / Bacteria     Urine Creatinine 213      [04-27-24 @ 10:30]  Urine Protein 64      [04-27-24 @ 10:30]  Urine Sodium <20      [04-27-24 @ 10:30]  Urine Urea Nitrogen 660.2      [04-27-24 @ 10:30]  Urine Potassium 50.1      [04-27-24 @ 10:30]  Urine Osmolality 564      [04-27-24 @ 10:39]    HBsAb 19.6      [04-27-24 @ 15:50]  HBsAg Nonreact      [04-27-24 @ 15:50]  HBcAb Nonreact      [04-27-24 @ 15:50]  HCV 0.24, Nonreact      [04-27-24 @ 05:00]  HIV Nonreact      [04-27-24 @ 18:56]    dsDNA 1      [04-27-24 @ 22:23]  C3 Complement 66      [04-27-24 @ 22:23]  C4 Complement 12      [04-30-24 @ 00:20]  Syphilis Screen (Treponema Pallidum Ab) Negative      [04-27-24 @ 15:50]  Free Light Chains: kappa 12.79, lambda 4.83, ratio = 2.65      [04-27 @ 15:50]    CMV PCRCMVPCR Log: NotDetec Hok75PH/mL (04-27 @ 16:37)    Parvo PCRParvovirus B19 DNA by PCR: NotDetec IU/mL (04-27 @ 15:41)

## 2024-04-30 NOTE — PROGRESS NOTE ADULT - ASSESSMENT
43M with hx of alcohol use disorder, restless leg syndrome, PVD, anxiety/PTSD ( service), PAD, taking adderall at home, presented as a transfer from [facility] ED for acute decompensated liver and respiratory failure and possible ECMO eval.    NEURO  # encephalopathy  - Pt encephalopathic on admission likely metabolic vs hepatic encephalopathy (ammonia 94). Intubated for airway protection  - currently sedated and paralyzed (on fent, prop, nimbex, ketamine)  - maintain RAAS -4 to -5 to maintain vent synchrony, wean per ICU protocol    #Seizures   - no documented history of seizures, appeared to be having seizure like activity during rounds 4/27  - s/o veeg negative  - monitor for now    CARDS  # shock  - likely vasoplegia iso sepsis vs hemorrhage vs sedation, currently on levo 0.04 and vaso 0.04, stable  - wean vasopressors per ICU protocol  - maintain MAP 65-70  - c/w methylprednisolone 40mg qd (max dose in liver dz)  - abx as below  - TTE 4/27 EF 64%, LAE, mild AR, mild MR    #Aortic calcification/?Bicuspid aortic valve  - Pt with aortic calcification on echo w/ possible bicuspid valve. +systolic murmur  - Echo w/o vegetation    PULM  # ARDS  - P/F reported to be 70 at [facility], reflecting severe ARDS. Now improved to 107  -- Peak pressure 36 w/ Plateau 32; PEEP decreased to 10 and subsequently peak pressure 32 and plateau 29  - POCUS w/ RLL consolidation. ARDS likely i/s/o PNA vs pneumonitis from hematemesis  - maintain lung protective ventilation, wean per ICU protocol  - c/w proning; pt with worsening UO  - duonebs q6    RENAL  # LUBA  - sCr on admit 1.86, baseline unclear. Now worsening 2.55. Pt with decreasing UO s/p bumex gtt and metalazone  - likely hepatorenal syndrome, vs ATN from shock  --- s/p albumin x2 days, on octreotide and levophed  - Renal protective measures: Please renally dose all medications; Avoid nephrotoxic medications (NSAIDS, IV contrast); Avoid ACEi and ARBs in the setting of LUBA; Maintain MAP >65;   - indwelling geronimo replaced on admit 4/27 from OSH,   - c/w bumex gtt  - Nephro c/s for CVVH; net negative 100cc/hr  - monitor strict I&O    GI  # hematemesis  - EGD at OSH noted blood clots in oropharynx, no obvious bleeding source, normal esophagus  - no epistaxis noted on exam  - 4/28 increased bloody output from OGT, no bloody output currently  - Hepatology following, no plan for EGD today  - protonix 40 mg IVP BID  - NPO  - cbc q6    # acute liver failure  - likely i/s/o alcohol use; AST / ALT 2:1 ratio. Bilirubin 21   - Last EtOH use: reportedly 4/20/24 per documentation from [employer]  - Smooth muscle ab 1:20   - MELD-Na on admission: 33, trend    - Variceal status: normal esophagus on EGD at OSH  - Per hepatology discontinue NAC  -- If pt improves, may be a candidate for transplant given high MELD per hepatology  - GI following, will appreciate recs    #Bowel regimen  - Pt without BM, will give relistor i/s/o fent use    ID  # Sepsis/septic shock  - POCUS w/ RLL consolidation, possible cause of infection  - UCx and BCx NGTD,   - s/p vanco and azithro, d/c iso neg legionella and mrsa  - c/w zosyn (4/27 - )  - Will send fungal work-up and start diflucan empirically per hepatology recommendation    ENDO  #Low TSH  - TSH 0.10, T4 WNL    HEME/ONC  # bicytopenia (anemia and thrombocytopenia)  # elevated INR  - likely iso liver failure vs acute bleed at OSH  - s/p 4 u prbc and 2 u FFP at OSH. received 1 u prbc, 1 u plt, and 1 u FFP on 4/27  - Plan for shiley placement and exchange of lines  -- s/p additional 3u FFP now, 1u plt during shiley palcement, and 1u pRBC 4/29  - transfuse for hb <7, plt <50 for bleeding  - monitor fibrinogen QD  - Vitamin K x2 (4/8-4/29)    #Portal vein thrombosis  - Evidence of portal vein thrombosis on US abdomen  - hold AC at this time iso upper GIB    DERM  # jaundice  - likely iso elevated bilirubin, acute liver failure    #B/l LE edema  - VA duplex negative    DVT: hold for now given anemia/hematemesis, SCD  FENGI: NPO for now given GIB  LDA: L IJ and axillary a-line and R mynor  GTT: prop, fent, nimbex, levo, vaso, ketamine  GOC: Full code for now, family: father Justice and has a brother. Case discussed with father and updates given.

## 2024-04-30 NOTE — PROGRESS NOTE ADULT - SUBJECTIVE AND OBJECTIVE BOX
Carlos Ponce  PGY-3 | Internal Medicine      INTERVAL HPI/OVERNIGHT EVENTS: s/p proning yesterday. Started on CVVH last night. Overnight, pt w/ rising Cr    SUBJECTIVE: Patient is intubated and sedated    OBJECTIVE:    VITAL SIGNS:  ICU Vital Signs Last 24 Hrs  T(C): 34.6 (30 Apr 2024 04:00), Max: 39.1 (29 Apr 2024 08:00)  T(F): 94.2 (30 Apr 2024 04:00), Max: 102.3 (29 Apr 2024 08:00)  HR: 78 (30 Apr 2024 06:00) (74 - 104)  BP: --  BP(mean): --  ABP: 120/58 (30 Apr 2024 06:00) (91/69 - 149/68)  ABP(mean): 79 (30 Apr 2024 06:00) (65 - 93)  RR: 28 (30 Apr 2024 06:00) (28 - 28)  SpO2: 100% (30 Apr 2024 06:00) (82% - 100%)    O2 Parameters below as of 30 Apr 2024 06:00  Patient On (Oxygen Delivery Method): ventilator    O2 Concentration (%): 70      Mode: AC/ CMV (Assist Control/ Continuous Mandatory Ventilation), RR (machine): 28, TV (machine): 400, FiO2: 70, PEEP: 10, ITime: 0.83, MAP: 20, PIP: 36    04-29 @ 07:01  -  04-30 @ 07:00  --------------------------------------------------------  IN: 3149.1 mL / OUT: 2519 mL / NET: 630.1 mL      CAPILLARY BLOOD GLUCOSE      POCT Blood Glucose.: 129 mg/dL (30 Apr 2024 06:09)      PHYSICAL EXAM:    General: NAD, sedated  HEENT: reactive pupils, scleral icterus  Neck: supple  Respiratory: upper airway breath sounds  Cardiovascular: systolic ejection murmur  Abdomen: soft, NT/ND; +BS x4  Extremities: WWP, 2+ peripheral pulses b/l; 1+ BLE edema  Skin: Ecchymosis on left lower back  Neurological: sedated and paralyzed    MEDICATIONS:  MEDICATIONS  (STANDING):  albuterol/ipratropium for Nebulization 3 milliLiter(s) Nebulizer every 6 hours  chlorhexidine 0.12% Liquid 15 milliLiter(s) Oral Mucosa every 12 hours  chlorhexidine 2% Cloths 1 Application(s) Topical daily  cisatracurium Infusion 2 MICROgram(s)/kG/Min (11.1 mL/Hr) IV Continuous <Continuous>  CRRT Treatment    <Continuous>  fentaNYL   Infusion..... 3 MICROgram(s)/kG/Hr (5.53 mL/Hr) IV Continuous <Continuous>  fluconAZOLE IVPB 400 milliGRAM(s) IV Intermittent every 24 hours  folic acid 1 milliGRAM(s) Oral daily  ketamine Infusion. 0.5 mG/kG/Hr (4.61 mL/Hr) IV Continuous <Continuous>  methylPREDNISolone sodium succinate Injectable 40 milliGRAM(s) IV Push every 6 hours  multivitamin 1 Tablet(s) Oral daily  mupirocin 2% Ointment 1 Application(s) Both Nostrils every 12 hours  norepinephrine Infusion 0.14 MICROgram(s)/kG/Min (12.1 mL/Hr) IV Continuous <Continuous>  octreotide  Infusion 50 MICROgram(s)/Hr (10 mL/Hr) IV Continuous <Continuous>  pantoprazole  Injectable 40 milliGRAM(s) IV Push two times a day  petrolatum Ophthalmic Ointment 1 Application(s) Both EYES two times a day  piperacillin/tazobactam IVPB.. 4.5 Gram(s) IV Intermittent every 8 hours  PrismaSATE Dialysate BK 0 / 3.5 5000 milliLiter(s) (2500 mL/Hr) CRRT <Continuous>  PrismaSOL Filtration BGK 0 / 2.5 5000 milliLiter(s) (200 mL/Hr) CRRT <Continuous>  PrismaSOL Filtration BGK 0 / 2.5 5000 milliLiter(s) (1300 mL/Hr) CRRT <Continuous>  propofol Infusion 49.946 MICROgram(s)/kG/Min (27.6 mL/Hr) IV Continuous <Continuous>  thiamine 100 milliGRAM(s) Oral daily  vasopressin Infusion 0.04 Unit(s)/Min (6 mL/Hr) IV Continuous <Continuous>    MEDICATIONS  (PRN):      ALLERGIES:  Allergies    Allergy Status Unknown    Intolerances        LABS:                        7.7    5.22  )-----------( 38       ( 30 Apr 2024 06:10 )             23.0     04-30    134<L>  |  93<L>  |  44<H>  ----------------------------<  114<H>  3.5   |  18<L>  |  1.72<H>    Ca    9.4      30 Apr 2024 06:10  Phos  5.3     04-30  Mg     2.00     04-30    TPro  6.6  /  Alb  3.9  /  TBili  22.9<H>  /  DBili  x   /  AST  124<H>  /  ALT  39  /  AlkPhos  39<L>  04-30    PT/INR - ( 30 Apr 2024 06:10 )   PT: 38.3 sec;   INR: 3.52 ratio         PTT - ( 30 Apr 2024 06:10 )  PTT:38.3 sec  Urinalysis Basic - ( 30 Apr 2024 06:10 )    Color: x / Appearance: x / SG: x / pH: x  Gluc: 114 mg/dL / Ketone: x  / Bili: x / Urobili: x   Blood: x / Protein: x / Nitrite: x   Leuk Esterase: x / RBC: x / WBC x   Sq Epi: x / Non Sq Epi: x / Bacteria: x        RADIOLOGY & ADDITIONAL TESTS: Reviewed.

## 2024-05-01 NOTE — PROGRESS NOTE ADULT - ASSESSMENT
43M with hx of alcohol use disorder, restless leg syndrome, PVD, anxiety/PTSD ( service), PAD, taking adderall at home, presented as a transfer from [facility] ED for acute decompensated liver and respiratory failure and possible ECMO eval.    NEURO  # encephalopathy  - Pt encephalopathic on admission likely metabolic vs hepatic encephalopathy (ammonia 94). Intubated for airway protection  - currently sedated and paralyzed (on fent, prop, nimbex, ketamine)  - maintain RAAS -4 to -5 to maintain vent synchrony, wean per ICU protocol    #Seizures   - no documented history of seizures, appeared to be having seizure like activity during rounds 4/27  - s/o veeg negative  - monitor for now    CARDS  # shock  - likely vasoplegia iso sepsis vs hemorrhage vs sedation, currently on levo 0.03 and vaso 0.04, stable  - wean vasopressors per ICU protocol  - maintain MAP 65-70  - abx as below  - TTE 4/27 EF 64%, LAE, mild AR, mild MR    #Aortic calcification/?Bicuspid aortic valve  - Pt with aortic calcification on echo w/ possible bicuspid valve. +systolic murmur  - Echo w/o vegetation    PULM  # ARDS  - P/F reported to be 70 at [facility], reflecting severe ARDS. Now improved to 107  -- Peak pressure 36 w/ Plateau 32; PEEP decreased to 10 and subsequently peak pressure 32 and plateau 29; modify vent setting per blood gas  - POCUS w/ RLL consolidation. ARDS likely i/s/o PNA vs pneumonitis from hematemesis  - maintain lung protective ventilation, wean per ICU protocol  - s/p proning with improvement in oxygenation  - duonebs q6    RENAL  # LUBA  - sCr on admit 1.86, baseline unclear. Now worsening 2.55. Pt with decreasing UO s/p bumex gtt and metalazone  - likely hepatorenal syndrome, vs ATN from shock  --- s/p albumin x2 days, on octreotide and levophed  - Renal protective measures: Please renally dose all medications; Avoid nephrotoxic medications (NSAIDS, IV contrast); Avoid ACEi and ARBs in the setting of LUBA; Maintain MAP >65;   - indwelling geronimo replaced on admit 4/27 from OSH,   - Nephro c/s for CVVH; net negative 200cc/hr  - monitor strict I&O    GI  # hematemesis  - EGD at OSH noted blood clots in oropharynx, no obvious bleeding source, normal esophagus  - no epistaxis noted on exam  - 4/28 increased bloody output from OGT, no bloody output currently  - Hepatology following, no plan for EGD today  - protonix 40 mg IVP BID  - NPO  - cbc q6    # acute liver failure  - likely i/s/o alcohol use; AST / ALT 2:1 ratio. Bilirubin 21   - Last EtOH use: reportedly 4/20/24 per documentation from [facility]  - Smooth muscle ab 1:20   - MELD-Na on admission: 33, trend    - Variceal status: normal esophagus on EGD at OSH  - Per hepatology discontinue NAC  -- If pt improves, may be a candidate for transplant given high MELD per hepatology  - GI following, will appreciate recs    #Bowel regimen  - Pt without BM, will give relistor i/s/o fent use    ID  # Sepsis/septic shock  - POCUS w/ RLL consolidation, possible cause of infection  - UCx and BCx NGTD,   - s/p vanco and azithro, d/c iso neg legionella and mrsa  - c/w zosyn (4/27 - )  - f/u fungal work-up and c/w diflucan empirically per hepatology recommendation    ENDO  #Low TSH  - TSH 0.10, T4 WNL    HEME/ONC  # bicytopenia (anemia and thrombocytopenia)  # elevated INR  - likely iso liver failure vs acute bleed at OSH  - s/p 4 u prbc and 2 u FFP at OSH. received 1 u prbc, 1 u plt, and 1 u FFP on 4/27  - Plan for shiley placement and exchange of lines  -- s/p additional 3u FFP now, 1u plt during shiley palcement, and 1u pRBC 4/29  - transfuse for hb <7, plt <50 for bleeding  - monitor fibrinogen QD  - Vitamin K x2 (4/28-4/29)    #Portal vein thrombosis  - Evidence of portal vein thrombosis on US abdomen  - hold AC at this time iso upper GIB    DERM  # jaundice  - likely iso elevated bilirubin, acute liver failure    #B/l LE edema  - VA duplex negative    DVT: hold for now given anemia/hematemesis, SCD  FENGI: NPO for now given GIB  LDA: L IJ and radial a-line and R shiley  GTT: prop, fent, nimbex, levo, vaso, ketamine  GOC: Full code for now, family: father Justice and has a brother. Case discussed with father and updates given.

## 2024-05-01 NOTE — CHART NOTE - NSCHARTNOTEFT_GEN_A_CORE
Patient examined and discussed with MICU team. P/F ratio is improving after proning yesterday. Other parameters remain relatively stable; patient intubated, sedated, paralyzed, on two vasopressors and CRRT. No signs of recurrent bleeding.     Recommendations:  - Ongoing respiratory and hemodynamic support per ICU team  - Continue empiric antimicrobials  - Stop octreotide 5/2   - Okay for feedings  - Hepatology will continue to follow    Jurgen Ross MD  Gastroenterology/Hepatology Fellow

## 2024-05-01 NOTE — PROGRESS NOTE ADULT - ASSESSMENT
43M with hx of alcohol use disorder and PAD, presented as a transfer from [place] ED for acute decompensated liver and respiratory failure and possible ECMO eval. Nephrology service consulted for LUBA and volume overload.

## 2024-05-01 NOTE — CHART NOTE - NSCHARTNOTEFT_GEN_A_CORE
NUTRITION FOLLOW-UP      42 y/o presenting to [facility] (4/25) intubated/sedated for airway protection and with incidental findings of GI bleed. [facility] in setting of acute hypoxic respiratory failure 2/2 PNA with ARDS with   Also with seizures, jaundice, LUBA, SIRS and shock.  On CVVHD.     Pt. remains intubated/sedated.  ~729 non-nutritive lipid calories provided by Propofol over past 24hrs.  Pt. initiated on enteral feedings with VitalHP. Currently observed @ 40mL/hr.  Spoke with RN.  Pt. without any noted/reported intolerance to TF @ this time (no vomiting/diarrhea/constipation or abdominal distention).  No noted bowel movement since admit (x at least 4d).  On bowel regimen.  Current enteral regimen remains appropriate.  Would continue, as tolerated.      Weight changes likely fluid related.           _________________Diet___________________  Diet, NPO with Tube Feed:   Tube Feeding Modality: Orogastric  Vital High Protein (VITALHP)  Total Volume for 24 Hours (mL): 1080  Continuous  Starting Tube Feed Rate {mL per Hour}: 10  Increase Tube Feed Rate by (mL): 10     Every 4 hours  Until Goal Tube Feed Rate (mL per Hour): 60  Tube Feed Duration (in Hours): 18  Tube Feed Start Time: 11:00  Tube Feed Stop Time: 05:00  Liquid Protein Supplement     Qty per Day:  1 (04-30-24 @ 15:30) [Active]    provides:  1080mL total volume  1080 kcals (+ Propofol KCals)  94g protein (+ 15g additional protein via LPS)= 109g protein   902mL free water      Weight:                    Height:  67" / 170.18cm               Ideal Body Weight: 148lbs / 67.1kg  90.7kg (5/1)  100.8kg (4/28)  92.1kg (4/27 on admit)        _____Previously Estimated Energy Needs (based on )_____  22-25 KCals/kg = 1503-8337 KCals/d  1.6-1.8g proteing/kg= 108-121g protein/d        Edema: 4+ b/l hands/feet/arms/legs  Skin: No noted pressure injuries        ________________________Pertinent Medications____________   MEDICATIONS  (STANDING):  albuterol/ipratropium for Nebulization 3 milliLiter(s) Nebulizer every 6 hours  chlorhexidine 0.12% Liquid 15 milliLiter(s) Oral Mucosa every 12 hours  chlorhexidine 2% Cloths 1 Application(s) Topical daily  cisatracurium Infusion 2 MICROgram(s)/kG/Min (11.1 mL/Hr) IV Continuous <Continuous>  CRRT Treatment    <Continuous>  fentaNYL   Infusion..... 3 MICROgram(s)/kG/Hr (5.53 mL/Hr) IV Continuous <Continuous>  fluconAZOLE IVPB 400 milliGRAM(s) IV Intermittent <User Schedule>  folic acid 1 milliGRAM(s) Oral daily  ketamine Infusion. 0.5 mG/kG/Hr (4.61 mL/Hr) IV Continuous <Continuous>  multivitamin 1 Tablet(s) Oral daily  mupirocin 2% Ointment 1 Application(s) Both Nostrils every 12 hours  norepinephrine Infusion 0.14 MICROgram(s)/kG/Min (12.1 mL/Hr) IV Continuous <Continuous>  octreotide  Infusion 50 MICROgram(s)/Hr (10 mL/Hr) IV Continuous <Continuous>  pantoprazole  Injectable 40 milliGRAM(s) IV Push two times a day  petrolatum Ophthalmic Ointment 1 Application(s) Both EYES two times a day  Phoxillum Filtration BK 4 / 2.5 5000 milliLiter(s) (2500 mL/Hr) CRRT <Continuous>  Phoxillum Filtration BK 4 / 2.5 5000 milliLiter(s) (200 mL/Hr) CRRT <Continuous>  piperacillin/tazobactam IVPB.. 4.5 Gram(s) IV Intermittent every 8 hours  polyethylene glycol 3350 17 Gram(s) Oral two times a day  PrismaSOL Filtration BGK 4 / 2.5 5000 milliLiter(s) (1300 mL/Hr) CRRT <Continuous>  propofol Infusion 49.946 MICROgram(s)/kG/Min (27.6 mL/Hr) IV Continuous <Continuous>  senna 2 Tablet(s) Oral at bedtime  thiamine 100 milliGRAM(s) Oral daily  vasopressin Infusion 0.04 Unit(s)/Min (6 mL/Hr) IV Continuous <Continuous>    MEDICATIONS  (PRN):          _________________________Pertinent Labs____________________     05-01    131<L>  |  97<L>  |  26<H>  ----------------------------<  136<H>  3.8   |  21<L>  |  1.26    Ca    8.5      01 May 2024 08:00  Phos  2.7     05-01  Mg     2.50     05-01    TPro  6.9  /  Alb  3.6  /  TBili  22.8<H>  /  DBili  x   /  AST  136<H>  /  ALT  43<H>  /  AlkPhos  46  05-01                                                                   8.1    8.38  )-----------( 45       ( 01 May 2024 08:00 )             24.0         CAPILLARY BLOOD GLUCOSE      POCT Blood Glucose.: 129 mg/dL (30 Apr 2024 06:09)        ________NUTRITION Dx_________    Pt. remains severely malnourished         PLAN/RECOMMENDATIONS:    1) Continue current enteral regimen  2) Obtain daily weights  3) Monitor tolerance to TF, GI status, electrolytes, glucose     RDN remains available and will f/u PRN.          Kadi Giordano, DOUGN, CDN       pager [phone] or MS Teams

## 2024-05-01 NOTE — PROGRESS NOTE ADULT - SUBJECTIVE AND OBJECTIVE BOX
Carlos Ponce  PGY-3 | Internal Medicine      INTERVAL HPI/OVERNIGHT EVENTS: CVVH increased last night to 200cc/hr. Pt attempted to be proned again but w/ bleeding from prior IV sites and skin, so held off    SUBJECTIVE: Patient seen and examined at bedside.     CONSTITUTIONAL: No weakness, fevers or chills  EYES/ENT: No visual changes;  No vertigo or throat pain   NECK: No pain or stiffness  RESPIRATORY: No cough, wheezing, hemoptysis; No shortness of breath  CARDIOVASCULAR: No chest pain or palpitations  GASTROINTESTINAL: No abdominal or epigastric pain. No nausea, vomiting, or hematemesis; No diarrhea or constipation. No melena or hematochezia.  GENITOURINARY: No dysuria, frequency or hematuria  NEUROLOGICAL: No numbness or weakness  SKIN: No itching, rashes    OBJECTIVE:    VITAL SIGNS:  ICU Vital Signs Last 24 Hrs  T(C): 36.4 (01 May 2024 04:00), Max: 37.1 (01 May 2024 00:00)  T(F): 97.6 (01 May 2024 04:00), Max: 98.8 (01 May 2024 00:00)  HR: 81 (01 May 2024 07:10) (68 - 90)  BP: --  BP(mean): --  ABP: 144/75 (01 May 2024 06:35) (91/46 - 144/75)  ABP(mean): 102 (01 May 2024 06:35) (60 - 102)  RR: 28 (01 May 2024 06:00) (28 - 28)  SpO2: 100% (01 May 2024 07:10) (91% - 100%)    O2 Parameters below as of 01 May 2024 07:06  Patient On (Oxygen Delivery Method): ventilator          Mode: AC/ CMV (Assist Control/ Continuous Mandatory Ventilation), RR (machine): 28, TV (machine): 400, FiO2: 50, PEEP: 10, ITime: 0.8, MAP: 18, PIP: 34    04-30 @ 07:01  -  05-01 @ 07:00  --------------------------------------------------------  IN: 3907.4 mL / OUT: 6621 mL / NET: -2713.6 mL      CAPILLARY BLOOD GLUCOSE      POCT Blood Glucose.: 129 mg/dL (30 Apr 2024 06:09)      PHYSICAL EXAM:    General: NAD, sedated  HEENT: reactive pupils, scleral icterus  Neck: supple  Respiratory: upper airway breath sounds  Cardiovascular: systolic ejection murmur  Abdomen: soft, NT/ND; +BS x4  Extremities: WWP, 2+ peripheral pulses b/l; 1+ BLE edema  Skin: Ecchymosis on left lower back  Neurological: sedated and paralyzed      MEDICATIONS:  MEDICATIONS  (STANDING):  albuterol/ipratropium for Nebulization 3 milliLiter(s) Nebulizer every 6 hours  chlorhexidine 0.12% Liquid 15 milliLiter(s) Oral Mucosa every 12 hours  chlorhexidine 2% Cloths 1 Application(s) Topical daily  cisatracurium Infusion 2 MICROgram(s)/kG/Min (11.1 mL/Hr) IV Continuous <Continuous>  CRRT Treatment    <Continuous>  fentaNYL   Infusion..... 3 MICROgram(s)/kG/Hr (5.53 mL/Hr) IV Continuous <Continuous>  fluconAZOLE IVPB 400 milliGRAM(s) IV Intermittent <User Schedule>  folic acid 1 milliGRAM(s) Oral daily  ketamine Infusion. 0.5 mG/kG/Hr (4.61 mL/Hr) IV Continuous <Continuous>  multivitamin 1 Tablet(s) Oral daily  mupirocin 2% Ointment 1 Application(s) Both Nostrils every 12 hours  norepinephrine Infusion 0.14 MICROgram(s)/kG/Min (12.1 mL/Hr) IV Continuous <Continuous>  octreotide  Infusion 50 MICROgram(s)/Hr (10 mL/Hr) IV Continuous <Continuous>  pantoprazole  Injectable 40 milliGRAM(s) IV Push two times a day  petrolatum Ophthalmic Ointment 1 Application(s) Both EYES two times a day  piperacillin/tazobactam IVPB.. 4.5 Gram(s) IV Intermittent every 8 hours  polyethylene glycol 3350 17 Gram(s) Oral two times a day  PrismaSATE Dialysate BGK 4 / 2.5 5000 milliLiter(s) (2500 mL/Hr) CRRT <Continuous>  PrismaSOL Filtration BGK 4 / 2.5 5000 milliLiter(s) (200 mL/Hr) CRRT <Continuous>  PrismaSOL Filtration BGK 4 / 2.5 5000 milliLiter(s) (1300 mL/Hr) CRRT <Continuous>  propofol Infusion 49.946 MICROgram(s)/kG/Min (27.6 mL/Hr) IV Continuous <Continuous>  senna 2 Tablet(s) Oral at bedtime  thiamine 100 milliGRAM(s) Oral daily  vasopressin Infusion 0.04 Unit(s)/Min (6 mL/Hr) IV Continuous <Continuous>    MEDICATIONS  (PRN):      ALLERGIES:  Allergies    Allergy Status Unknown    Intolerances        LABS:                        8.1    8.79  )-----------( 44       ( 01 May 2024 02:15 )             23.4     05-01    131<L>  |  97<L>  |  28<H>  ----------------------------<  122<H>  3.9   |  21<L>  |  1.35<H>    Ca    8.7      01 May 2024 02:15  Phos  2.2     05-01  Mg     2.40     05-01    TPro  6.9  /  Alb  3.6  /  TBili  22.8<H>  /  DBili  x   /  AST  136<H>  /  ALT  43<H>  /  AlkPhos  46  05-01    PT/INR - ( 01 May 2024 02:15 )   PT: 38.7 sec;   INR: 3.59 ratio         PTT - ( 01 May 2024 02:15 )  PTT:36.8 sec  Urinalysis Basic - ( 01 May 2024 02:15 )    Color: x / Appearance: x / SG: x / pH: x  Gluc: 122 mg/dL / Ketone: x  / Bili: x / Urobili: x   Blood: x / Protein: x / Nitrite: x   Leuk Esterase: x / RBC: x / WBC x   Sq Epi: x / Non Sq Epi: x / Bacteria: x        RADIOLOGY & ADDITIONAL TESTS: Reviewed.

## 2024-05-01 NOTE — PROGRESS NOTE ADULT - PROBLEM SELECTOR PLAN 1
Patient with LUBA iso decompensated liver cirrhosis. Found to be anemic and thrombocytopenic. No obvious bleeding source on EGD. Patient developed ARDS due to liver failure and receiving multiple blood products, remains intubated. He is on Levo, Vaso, Octreotide, and Bumex gtts. Was non-oliguric, but BUN/Cr continued to rise from 46/1.86 on admission to 70/2.55 on 4/29. Also was acidotic with pH 7.26. Urine studies significant for very low Na<20 c/w HRS as well. Agree with Levo and Octreotide. Despite adequate UO, pt remained net positive 2L and in ARDS. Therefore, CRRT initiated on 4/29 for fluid removal and removal of ammonia (94). Consent obtained from the father. Tolerating UF 100mL/h without increasing pressor requirements, now net negative 3L/24h and p/f ratio improving per MICU. Will continue CRRT.  However, overall prognosis remains guarded.      Jolanta Mata, DO  Nephrology Fellow  Feel free to contact me directly on TEAMS with any questions.  (After 5pm or on weekends, please call the on-call fellow).

## 2024-05-01 NOTE — PROGRESS NOTE ADULT - SUBJECTIVE AND OBJECTIVE BOX
[facility] DIVISION OF KIDNEY DISEASES AND HYPERTENSION   FOLLOW UP NOTE    --------------------------------------------------------------------------------    SUBJECTIVE / ROS / INTERVAL EVENTS:  - Patient seen and examined at bedside      PAST HISTORY  --------------------------------------------------------------------------------  No significant changes to PMH, PSH, FHx, SHx, unless otherwise noted    ALLERGIES & MEDICATIONS  --------------------------------------------------------------------------------  Allergies    Allergy Status Unknown    Intolerances      Standing Inpatient Medications  albuterol/ipratropium for Nebulization 3 milliLiter(s) Nebulizer every 6 hours  chlorhexidine 0.12% Liquid 15 milliLiter(s) Oral Mucosa every 12 hours  chlorhexidine 2% Cloths 1 Application(s) Topical daily  CRRT Treatment    <Continuous>  fentaNYL   Infusion..... 3 MICROgram(s)/kG/Hr IV Continuous <Continuous>  fluconAZOLE IVPB 400 milliGRAM(s) IV Intermittent <User Schedule>  folic acid 1 milliGRAM(s) Oral daily  ketamine Infusion. 0.5 mG/kG/Hr IV Continuous <Continuous>  multivitamin/minerals/iron Oral Solution (CENTRUM) 15 milliLiter(s) Oral daily  mupirocin 2% Ointment 1 Application(s) Both Nostrils every 12 hours  norepinephrine Infusion 0.14 MICROgram(s)/kG/Min IV Continuous <Continuous>  octreotide  Infusion 50 MICROgram(s)/Hr IV Continuous <Continuous>  pantoprazole  Injectable 40 milliGRAM(s) IV Push two times a day  petrolatum Ophthalmic Ointment 1 Application(s) Both EYES two times a day  Phoxillum Filtration BK 4 / 2.5 5000 milliLiter(s) CRRT <Continuous>  Phoxillum Filtration BK 4 / 2.5 5000 milliLiter(s) CRRT <Continuous>  piperacillin/tazobactam IVPB.. 4.5 Gram(s) IV Intermittent every 8 hours  polyethylene glycol 3350 17 Gram(s) Oral two times a day  PrismaSOL Filtration BGK 4 / 2.5 5000 milliLiter(s) CRRT <Continuous>  propofol Infusion 49.946 MICROgram(s)/kG/Min IV Continuous <Continuous>  senna 2 Tablet(s) Oral at bedtime  thiamine 100 milliGRAM(s) Oral daily  vasopressin Infusion 0.04 Unit(s)/Min IV Continuous <Continuous>    PRN Inpatient Medications      VITALS  --------------------------------------------------------------------------------  T(C): 36 (05-01-24 @ 16:00), Max: 37.1 (05-01-24 @ 00:00)  HR: 77 (05-01-24 @ 17:00) (66 - 84)  BP: --  RR: 28 (05-01-24 @ 17:00) (28 - 28)  SpO2: 100% (05-01-24 @ 17:00) (91% - 100%)  Wt(kg): --      04-30-24 @ 07:01  -  05-01-24 @ 07:00  --------------------------------------------------------  IN: 3907.4 mL / OUT: 7431 mL / NET: -3523.6 mL    05-01-24 @ 07:01  -  05-01-24 @ 17:22  --------------------------------------------------------  IN: 1668.4 mL / OUT: 2261 mL / NET: -592.6 mL      PHYSICAL EXAM:  General: intubated, on pressors  Neuro: sedated  Pulmonary: coarse breath sounds, vented  Cardiovascular/Chest: +S1S2  : juanpablo  Extremities: ++LE edema  Skin: Warm and dry, +jaundice  Dialysis access: RIJ non-tunneled cath    LABS/STUDIES  --------------------------------------------------------------------------------              8.2    11.49 >-----------<  69       [05-01-24 @ 13:00]              24.2     133  |  99  |  24  ----------------------------<  111      [05-01-24 @ 13:00]  3.7   |  24  |  1.23        Ca     8.6     [05-01-24 @ 13:00]      iCa    1.20     [05-01 @ 02:15]      Mg     2.50     [05-01-24 @ 13:00]      Phos  3.1     [05-01-24 @ 13:00]    TPro  6.5  /  Alb  3.9  /  TBili  25.1  /  DBili  x   /  AST  152  /  ALT  47  /  AlkPhos  45  [05-01-24 @ 13:00]    PT/INR: PT 35.1 , INR 3.24       [05-01-24 @ 13:00]  PTT: 37.5       [05-01-24 @ 13:00]      Creatinine Trend:  SCr 1.23 [05-01 @ 13:00]  SCr 1.26 [05-01 @ 08:00]  SCr 1.35 [05-01 @ 02:15]  SCr 1.38 [04-30 @ 20:14]  SCr 1.52 [04-30 @ 14:30]    Urinalysis - [05-01-24 @ 13:00]      Color  / Appearance  / SG  / pH       Gluc 111 / Ketone   / Bili  / Urobili        Blood  / Protein  / Leuk Est  / Nitrite       RBC  / WBC  / Hyaline  / Gran  / Sq Epi  / Non Sq Epi  / Bacteria     Urine Creatinine 213      [04-27-24 @ 10:30]  Urine Protein 64      [04-27-24 @ 10:30]  Urine Sodium <20      [04-27-24 @ 10:30]  Urine Urea Nitrogen 660.2      [04-27-24 @ 10:30]  Urine Potassium 50.1      [04-27-24 @ 10:30]  Urine Osmolality 564      [04-27-24 @ 10:39]    HBsAb 19.6      [04-27-24 @ 15:50]  HBsAg Nonreact      [04-27-24 @ 15:50]  HBcAb Nonreact      [04-27-24 @ 15:50]  HCV 0.24, Nonreact      [04-27-24 @ 05:00]  HIV Nonreact      [04-27-24 @ 18:56]    ERLIN: titer Negative, pattern --      [04-27-24 @ 15:50]  dsDNA 1      [04-27-24 @ 22:23]  C3 Complement 66      [04-27-24 @ 22:23]  C4 Complement 12      [04-30-24 @ 00:20]  ANCA: cANCA Negative, pANCA Negative, atypical ANCA Indeterminate Method interference due to ERLIN fluorescence      [04-27-24 @ 22:23]  anti-GBM <0.2      [04-27-24 @ 22:23]  Syphilis Screen (Treponema Pallidum Ab) Negative      [04-27-24 @ 15:50]  Free Light Chains: kappa 12.79, lambda 4.83, ratio = 2.65      [04-27 @ 15:50]    CMV PCRCMVPCR Log: NotDetec Wqm56TV/mL (04-27 @ 16:37)    Parvo PCRParvovirus B19 DNA by PCR: NotDetec IU/mL (04-27 @ 15:41)

## 2024-05-01 NOTE — PROGRESS NOTE ADULT - ATTENDING COMMENTS
1. Acute hypoxemic respiratory failure secondary to ARDS. POCUS reveals  enlarging consolidations Pt proned this am. FIO2 60% 10 peep.  Plateau 27.  RV now longer under stress . Smaller than LV. Pt supined this afternoon. Tolerating FIO2 50% and 10 peep. Will keep supined.  Stop Nimbex.  Taper off Fentanyl.    2. Renal. Acute renal failure  due to ATN vs hepatorenal failure. Continue CVVHD. Removing 100cc/hr.     3. GI/Liver:  Decompensated ETOH induced liver cirrhosis. Pt had EGD for Gi bleed. No active bleeding.  Continue Octreotide,  and PPI.   Translate     4. Hypotension: Pt remains dependent on  Levophed and Vasopressin. Pressors decreasing.   Titrate for MAP> 60. Trial of stress steroids.    5.  DVT prophylaxis: SCD    6. GOC: DNR.    ID. Continue Zosyn for pneumonia.

## 2024-05-01 NOTE — PROGRESS NOTE ADULT - ATTENDING COMMENTS
Patient examined and ROS reviewed. A case of LUBA with ARDS and decompensated cirrhosis on vasopressors undergoing CRRT. Patient is tolerating CRRT. Advised fluid balance and monitor electrolytes.

## 2024-05-02 NOTE — PROGRESS NOTE ADULT - ATTENDING COMMENTS
oliguric ATN   ARDS   on CRRT with UF as tolrated  increase pre-filter fluid to 2 liters to help with circuit clotting.

## 2024-05-02 NOTE — PROGRESS NOTE ADULT - ATTENDING COMMENTS
1. Acute hypoxemic respiratory failure secondary to ARDS. Pt supined this afternoon. Tolerating FIO2 40% and 8peep. Will keep supined.    Off Nimbex.  Taper off Fentanyl.    2. Renal. Acute renal failure  due to ATN vs hepatorenal failure. Continue CVVHD. Removing 100cc/hr.     3. GI/Liver:  Decompensated ETOH induced liver cirrhosis. Pt had EGD for Gi bleed. No active bleeding.  Continue Octreotide,  and PPI.   Translate     4. Hypotension: Pt remains dependent on  Levophed and Vasopressin. Pressors decreasing.   Titrate for MAP> 60. Trial of stress steroids.    5.  DVT prophylaxis: SCD    6. GOC: Full code    ID. Continue Zosyn for pneumonia.

## 2024-05-02 NOTE — PROGRESS NOTE ADULT - SUBJECTIVE AND OBJECTIVE BOX
Carlos Ponce  PGY-2 | Internal Medicine      INTERVAL HPI/OVERNIGHT EVENTS: Vasopressor discontinued. Fentanyl being decreased    SUBJECTIVE: Patient seen and examined at bedside.     Patient is intubated and sedated      OBJECTIVE:    VITAL SIGNS:  ICU Vital Signs Last 24 Hrs  T(C): 36.3 (02 May 2024 04:00), Max: 37.9 (02 May 2024 00:00)  T(F): 97.3 (02 May 2024 04:00), Max: 100.3 (02 May 2024 00:00)  HR: 87 (02 May 2024 07:00) (66 - 95)  BP: --  BP(mean): --  ABP: 104/48 (02 May 2024 07:00) (84/42 - 145/77)  ABP(mean): 64 (02 May 2024 07:00) (56 - 103)  RR: 28 (02 May 2024 07:00) (28 - 28)  SpO2: 95% (02 May 2024 07:00) (93% - 100%)    O2 Parameters below as of 02 May 2024 07:00  Patient On (Oxygen Delivery Method): ventilator    O2 Concentration (%): 40      Mode: AC/ CMV (Assist Control/ Continuous Mandatory Ventilation), RR (machine): 28, TV (machine): 400, FiO2: 40, PEEP: 10, ITime: 0.78, MAP: 18, PIP: 34    05-01 @ 07:01  -  05-02 @ 07:00  --------------------------------------------------------  IN: 4394.3 mL / OUT: 6040 mL / NET: -1645.7 mL      CAPILLARY BLOOD GLUCOSE          PHYSICAL EXAM:    General: NAD, sedated  HEENT: reactive pupils, scleral icterus  Neck: supple  Respiratory: upper airway breath sounds  Cardiovascular: systolic ejection murmur  Abdomen: soft, NT/ND; +BS x4  Extremities: WWP, 2+ peripheral pulses b/l; 1+ BLE edema  Skin: Ecchymosis on left lower back  Neurological: sedated and paralyzed    MEDICATIONS:  MEDICATIONS  (STANDING):  albuterol/ipratropium for Nebulization 3 milliLiter(s) Nebulizer every 6 hours  calcium gluconate IVPB 2 Gram(s) IV Intermittent once  chlorhexidine 0.12% Liquid 15 milliLiter(s) Oral Mucosa every 12 hours  chlorhexidine 2% Cloths 1 Application(s) Topical daily  CRRT Treatment    <Continuous>  fentaNYL   Infusion..... 3 MICROgram(s)/kG/Hr (5.53 mL/Hr) IV Continuous <Continuous>  fluconAZOLE IVPB 400 milliGRAM(s) IV Intermittent <User Schedule>  folic acid 1 milliGRAM(s) Oral daily  ketamine Infusion. 0.5 mG/kG/Hr (4.61 mL/Hr) IV Continuous <Continuous>  multivitamin/minerals/iron Oral Solution (CENTRUM) 15 milliLiter(s) Oral daily  mupirocin 2% Ointment 1 Application(s) Both Nostrils every 12 hours  norepinephrine Infusion 0.14 MICROgram(s)/kG/Min (12.1 mL/Hr) IV Continuous <Continuous>  pantoprazole  Injectable 40 milliGRAM(s) IV Push two times a day  petrolatum Ophthalmic Ointment 1 Application(s) Both EYES two times a day  Phoxillum Filtration BK 4 / 2.5 5000 milliLiter(s) (200 mL/Hr) CRRT <Continuous>  Phoxillum Filtration BK 4 / 2.5 5000 milliLiter(s) (2500 mL/Hr) CRRT <Continuous>  piperacillin/tazobactam IVPB.. 4.5 Gram(s) IV Intermittent every 8 hours  polyethylene glycol 3350 17 Gram(s) Oral two times a day  PrismaSOL Filtration BGK 4 / 2.5 5000 milliLiter(s) (1300 mL/Hr) CRRT <Continuous>  propofol Infusion 49.946 MICROgram(s)/kG/Min (27.6 mL/Hr) IV Continuous <Continuous>  senna 2 Tablet(s) Oral at bedtime  thiamine 100 milliGRAM(s) Oral daily    MEDICATIONS  (PRN):      ALLERGIES:  Allergies    Allergy Status Unknown    Intolerances        LABS:                        8.0    13.41 )-----------( 84       ( 02 May 2024 03:00 )             24.3     05-02    133<L>  |  100  |  30<H>  ----------------------------<  96  3.8   |  20<L>  |  1.33<H>    Ca    8.2<L>      02 May 2024 03:00  Phos  2.4     05-02  Mg     2.40     05-02    TPro  6.8  /  Alb  3.5  /  TBili  23.9<H>  /  DBili  x   /  AST  173<H>  /  ALT  55<H>  /  AlkPhos  63  05-02    PT/INR - ( 02 May 2024 03:00 )   PT: 33.2 sec;   INR: 3.03 ratio         PTT - ( 02 May 2024 03:00 )  PTT:34.7 sec  Urinalysis Basic - ( 02 May 2024 03:00 )    Color: x / Appearance: x / SG: x / pH: x  Gluc: 96 mg/dL / Ketone: x  / Bili: x / Urobili: x   Blood: x / Protein: x / Nitrite: x   Leuk Esterase: x / RBC: x / WBC x   Sq Epi: x / Non Sq Epi: x / Bacteria: x        RADIOLOGY & ADDITIONAL TESTS: Reviewed. Carlos Ponce  PGY-2 | Internal Medicine      INTERVAL HPI/OVERNIGHT EVENTS: Vasopressor discontinued. Fentanyl being decreased. CVVH issue overnight, so off of CVVH for couple of hours. No sigificant UO. Now restarted on CVVH    SUBJECTIVE: Patient seen and examined at bedside.     Patient is intubated and sedated      OBJECTIVE:    VITAL SIGNS:  ICU Vital Signs Last 24 Hrs  T(C): 36.3 (02 May 2024 04:00), Max: 37.9 (02 May 2024 00:00)  T(F): 97.3 (02 May 2024 04:00), Max: 100.3 (02 May 2024 00:00)  HR: 87 (02 May 2024 07:00) (66 - 95)  BP: --  BP(mean): --  ABP: 104/48 (02 May 2024 07:00) (84/42 - 145/77)  ABP(mean): 64 (02 May 2024 07:00) (56 - 103)  RR: 28 (02 May 2024 07:00) (28 - 28)  SpO2: 95% (02 May 2024 07:00) (93% - 100%)    O2 Parameters below as of 02 May 2024 07:00  Patient On (Oxygen Delivery Method): ventilator    O2 Concentration (%): 40      Mode: AC/ CMV (Assist Control/ Continuous Mandatory Ventilation), RR (machine): 28, TV (machine): 400, FiO2: 40, PEEP: 10, ITime: 0.78, MAP: 18, PIP: 34    05-01 @ 07:01  -  05-02 @ 07:00  --------------------------------------------------------  IN: 4394.3 mL / OUT: 6040 mL / NET: -1645.7 mL      CAPILLARY BLOOD GLUCOSE          PHYSICAL EXAM:    General: NAD, sedated  HEENT: reactive pupils, scleral icterus  Neck: supple  Respiratory: upper airway breath sounds  Cardiovascular: systolic ejection murmur  Abdomen: soft, NT/ND; +BS x4  Extremities: WWP, 2+ peripheral pulses b/l; 1+ BLE edema  Skin: Ecchymosis on left lower back  Neurological: sedated and paralyzed    MEDICATIONS:  MEDICATIONS  (STANDING):  albuterol/ipratropium for Nebulization 3 milliLiter(s) Nebulizer every 6 hours  calcium gluconate IVPB 2 Gram(s) IV Intermittent once  chlorhexidine 0.12% Liquid 15 milliLiter(s) Oral Mucosa every 12 hours  chlorhexidine 2% Cloths 1 Application(s) Topical daily  CRRT Treatment    <Continuous>  fentaNYL   Infusion..... 3 MICROgram(s)/kG/Hr (5.53 mL/Hr) IV Continuous <Continuous>  fluconAZOLE IVPB 400 milliGRAM(s) IV Intermittent <User Schedule>  folic acid 1 milliGRAM(s) Oral daily  ketamine Infusion. 0.5 mG/kG/Hr (4.61 mL/Hr) IV Continuous <Continuous>  multivitamin/minerals/iron Oral Solution (CENTRUM) 15 milliLiter(s) Oral daily  mupirocin 2% Ointment 1 Application(s) Both Nostrils every 12 hours  norepinephrine Infusion 0.14 MICROgram(s)/kG/Min (12.1 mL/Hr) IV Continuous <Continuous>  pantoprazole  Injectable 40 milliGRAM(s) IV Push two times a day  petrolatum Ophthalmic Ointment 1 Application(s) Both EYES two times a day  Phoxillum Filtration BK 4 / 2.5 5000 milliLiter(s) (200 mL/Hr) CRRT <Continuous>  Phoxillum Filtration BK 4 / 2.5 5000 milliLiter(s) (2500 mL/Hr) CRRT <Continuous>  piperacillin/tazobactam IVPB.. 4.5 Gram(s) IV Intermittent every 8 hours  polyethylene glycol 3350 17 Gram(s) Oral two times a day  PrismaSOL Filtration BGK 4 / 2.5 5000 milliLiter(s) (1300 mL/Hr) CRRT <Continuous>  propofol Infusion 49.946 MICROgram(s)/kG/Min (27.6 mL/Hr) IV Continuous <Continuous>  senna 2 Tablet(s) Oral at bedtime  thiamine 100 milliGRAM(s) Oral daily    MEDICATIONS  (PRN):      ALLERGIES:  Allergies    Allergy Status Unknown    Intolerances        LABS:                        8.0    13.41 )-----------( 84       ( 02 May 2024 03:00 )             24.3     05-02    133<L>  |  100  |  30<H>  ----------------------------<  96  3.8   |  20<L>  |  1.33<H>    Ca    8.2<L>      02 May 2024 03:00  Phos  2.4     05-02  Mg     2.40     05-02    TPro  6.8  /  Alb  3.5  /  TBili  23.9<H>  /  DBili  x   /  AST  173<H>  /  ALT  55<H>  /  AlkPhos  63  05-02    PT/INR - ( 02 May 2024 03:00 )   PT: 33.2 sec;   INR: 3.03 ratio         PTT - ( 02 May 2024 03:00 )  PTT:34.7 sec  Urinalysis Basic - ( 02 May 2024 03:00 )    Color: x / Appearance: x / SG: x / pH: x  Gluc: 96 mg/dL / Ketone: x  / Bili: x / Urobili: x   Blood: x / Protein: x / Nitrite: x   Leuk Esterase: x / RBC: x / WBC x   Sq Epi: x / Non Sq Epi: x / Bacteria: x        RADIOLOGY & ADDITIONAL TESTS: Reviewed.

## 2024-05-02 NOTE — INITIAL ORGAN DONATION REFERRAL - NSORGANDONATIONCLINICALTRIGGER_GEN_ALL_CORE
[place] Coma Scale is less than or equal to 5/Family discussion withdrawal of life-sustaining therapies is anticipated

## 2024-05-02 NOTE — PROGRESS NOTE ADULT - SUBJECTIVE AND OBJECTIVE BOX
[facility] DIVISION OF KIDNEY DISEASES AND HYPERTENSION   FOLLOW UP NOTE    --------------------------------------------------------------------------------    SUBJECTIVE / ROS / INTERVAL EVENTS:  - Patient seen and examined at bedside  - Remains critically ill, intubated and sedated, on Levo. Anuric, on CRRT      PAST HISTORY  --------------------------------------------------------------------------------  No significant changes to PMH, PSH, FHx, SHx, unless otherwise noted    ALLERGIES & MEDICATIONS  --------------------------------------------------------------------------------  Allergies    Allergy Status Unknown      Standing Inpatient Medications  albuterol/ipratropium for Nebulization 3 milliLiter(s) Nebulizer every 6 hours  chlorhexidine 0.12% Liquid 15 milliLiter(s) Oral Mucosa every 12 hours  chlorhexidine 2% Cloths 1 Application(s) Topical daily  CRRT Treatment    <Continuous>  dexMEDEtomidine Infusion 0.3 MICROgram(s)/kG/Hr IV Continuous <Continuous>  fentaNYL   Infusion..... 3.002 MICROgram(s)/kG/Hr IV Continuous <Continuous>  folic acid 1 milliGRAM(s) Oral daily  heparin   Injectable 5000 Unit(s) SubCutaneous every 12 hours  ketamine Infusion. 0.501 mG/kG/Hr IV Continuous <Continuous>  multivitamin/minerals/iron Oral Solution (CENTRUM) 15 milliLiter(s) Oral daily  mupirocin 2% Ointment 1 Application(s) Both Nostrils every 12 hours  norepinephrine Infusion 0.14 MICROgram(s)/kG/Min IV Continuous <Continuous>  pantoprazole  Injectable 40 milliGRAM(s) IV Push two times a day  petrolatum Ophthalmic Ointment 1 Application(s) Both EYES two times a day  Phoxillum Filtration BK 4 / 2.5 5000 milliLiter(s) CRRT <Continuous>  Phoxillum Filtration BK 4 / 2.5 5000 milliLiter(s) CRRT <Continuous>  Phoxillum Filtration BK 4 / 2.5 5000 milliLiter(s) CRRT <Continuous>  piperacillin/tazobactam IVPB.. 4.5 Gram(s) IV Intermittent every 8 hours  polyethylene glycol 3350 17 Gram(s) Oral two times a day  propofol Infusion 49.946 MICROgram(s)/kG/Min IV Continuous <Continuous>  senna 2 Tablet(s) Oral at bedtime  thiamine 100 milliGRAM(s) Oral daily    PRN Inpatient Medications      VITALS  --------------------------------------------------------------------------------  T(C): 37.4 (05-02-24 @ 12:00), Max: 37.9 (05-02-24 @ 00:00)  HR: 89 (05-02-24 @ 12:00) (69 - 95)  BP: --  RR: 28 (05-02-24 @ 12:00) (28 - 28)  SpO2: 96% (05-02-24 @ 12:00) (93% - 100%)  Wt(kg): --      05-01-24 @ 07:01  -  05-02-24 @ 07:00  --------------------------------------------------------  IN: 4394.3 mL / OUT: 6325 mL / NET: -1930.7 mL    05-02-24 @ 07:01  -  05-02-24 @ 12:32  --------------------------------------------------------  IN: 1031.8 mL / OUT: 1224 mL / NET: -192.2 mL      PHYSICAL EXAM:  General: intubated, on pressors  Neuro: sedated  Pulmonary: coarse breath sounds, vented  Cardiovascular/Chest: +S1S2  : juanpablo  Extremities: ++LE edema  Skin: Warm and dry, +jaundice  Dialysis access: RIJ non-tunneled cath    LABS/STUDIES  --------------------------------------------------------------------------------              7.8    14.17 >-----------<  76       [05-02-24 @ 10:30]              23.9     137  |  101  |  26  ----------------------------<  70      [05-02-24 @ 10:30]  4.0   |  21  |  1.30        Ca     8.7     [05-02-24 @ 10:30]      iCa    1.11     [05-02 @ 03:00]      Mg     2.40     [05-02-24 @ 10:30]      Phos  4.0     [05-02-24 @ 10:30]    TPro  6.4  /  Alb  3.7  /  TBili  25.0  /  DBili  x   /  AST  193  /  ALT  59  /  AlkPhos  55  [05-02-24 @ 10:30]    PT/INR: PT 34.1 , INR 3.12       [05-02-24 @ 10:30]  PTT: 35.1       [05-02-24 @ 10:30]      Creatinine Trend:  SCr 1.30 [05-02 @ 10:30]  SCr 1.33 [05-02 @ 03:00]  SCr 1.39 [05-01 @ 21:15]  SCr 1.23 [05-01 @ 13:00]  SCr 1.26 [05-01 @ 08:00]    Urinalysis - [05-02-24 @ 10:30]      Color  / Appearance  / SG  / pH       Gluc 70 / Ketone   / Bili  / Urobili        Blood  / Protein  / Leuk Est  / Nitrite       RBC  / WBC  / Hyaline  / Gran  / Sq Epi  / Non Sq Epi  / Bacteria     Urine Creatinine 213      [04-27-24 @ 10:30]  Urine Protein 64      [04-27-24 @ 10:30]  Urine Sodium <20      [04-27-24 @ 10:30]  Urine Urea Nitrogen 660.2      [04-27-24 @ 10:30]  Urine Potassium 50.1      [04-27-24 @ 10:30]  Urine Osmolality 564      [04-27-24 @ 10:39]    HBsAb 19.6      [04-27-24 @ 15:50]  HBsAg Nonreact      [04-27-24 @ 15:50]  HBcAb Nonreact      [04-27-24 @ 15:50]  HCV 0.24, Nonreact      [04-27-24 @ 05:00]  HIV Nonreact      [04-27-24 @ 18:56]    ERLIN: titer Negative, pattern --      [04-27-24 @ 15:50]  dsDNA 1      [04-27-24 @ 22:23]  C3 Complement 66      [04-27-24 @ 22:23]  C4 Complement 12      [04-30-24 @ 00:20]  ANCA: cANCA Negative, pANCA Negative, atypical ANCA Indeterminate Method interference due to ERLIN fluorescence      [04-27-24 @ 22:23]  anti-GBM <0.2      [04-27-24 @ 22:23]  Syphilis Screen (Treponema Pallidum Ab) Negative      [04-27-24 @ 15:50]  Free Light Chains: kappa 12.79, lambda 4.83, ratio = 2.65      [04-27 @ 15:50]    CMV PCRCMVPCR Log: NotDetec Ret08FV/mL (04-27 @ 16:37)    Parvo PCRParvovirus B19 DNA by PCR: NotDetec IU/mL (04-27 @ 15:41)

## 2024-05-02 NOTE — PROVIDER CONTACT NOTE (HYPOGLYCEMIA EVENT) - NS PROVIDER CONTACT BACKGROUND-HYPO
Age: 43y    Gender: Male    POCT Blood Glucose:  133 mg/dL (05-02-24 @ 17:29)  52 mg/dL (05-02-24 @ 17:05)      eMAR:dextrose 50% Injectable   50 milliLiter(s) IV Push (05-02-24 @ 17:11)

## 2024-05-02 NOTE — PROGRESS NOTE ADULT - ASSESSMENT
Mr. Burns is a 43 year old male with alcohol use disorder, restless leg syndrome, PVD, anxiety/PTSD who [facility] from [facility] for respiratory failure and ECMO evaluation in the context of liver failure. Patient presented to OSH on 4/25 for chest pain with a hospital course c/b hematemesis, respiratory failure and hepatic dysfunction.     #Acute on chronic liver failure (ACLF)  #Alcohol-related cirrhosis  #ARDS  #Multi-organ failure   #Hematemesis, resolved   Patient with jaundice, coagulopathy and encephalopathy i/s/o alcohol use disorder and cirrhosis. Chronicity of underlying liver disease is unclear at this time. Suspect ACLF due to alcoholic hepatitis and underlying cirrhosis. RUQ US with hepatic steatosis and no ascites. CT at [facility] reports nodular liver and splenomegaly, likely cirrhotic. Doppler US with possible PVT, however not noted on CT from [facility].  MELD 3.0 score is 34>>44. Patient underwent EGD at OSH for hematemesis with findings of PHG, normal esophagus and hematin in the gastric fundus without actively bleeding source. Esophageal varices were not reported. Gastric varices are not entirely excluded.  Tylenol level and salicylate level were undetectable on 4/27. Acute viral hepatitis panel negative. HBcAb nonreactive. ASMA/AMA negative. UTox negative. HIV/CMV/EBV negative. Ceruloplasmin normal. Ferritin elevation not c/w hemochromatosis. TTE with severely dilated LV, mildly enlarged RV. MDF >80, however not a candidate for prednisone therapy for alcoholic hepatitis due to recent bleeding and critical illness. Patient remains intubated, ventilated and sedated on multiple vasopressors. Now s/p proning and initiation of CVVHD 4/29.  SUNDAY-C ACLF Score is 67 (calculated 4/30), predicting an 82.2% probability of death at 1 month.    Recommendations:  - Continue Zosyn & antifungal agent  - Agree with weaning narcotics and implementing bowel regimen due to likely bowel stasis   - IV PPI BID  - Monitor for recurrent bleeding  - Maintain active T&S and transfuse blood products as needed  - Obtain OSH EGD report  - No anticoagulation for possible PVT noted on doppler as not detected on CT, patient with recent bleed and ongoing coagulopathy  - Trend CBC, CMP, INR, Mg, Phos  - Hemodynamic and respiratory support per ICU team  - CRRT per Nephrology  - Not a liver transplant candidate due to critical illness; will re-assess candidacy pending patient's clinical response and extubation     Jurgen Ross MD  Gastroenterology/Hepatology Fellow  Available via [employer] Teams  Pager: [phone]    On Weekdays after 5 PM to 8AM and Weekends/Holidays (All day)  For non-urgent consults, please email [email or web address] or [email or web address]  For urgent consults, please contact on call GI team.  See Deborah schedule ([facility]), Vendscreening system - [phone] ([facility]), or call hospital  ([facility])

## 2024-05-02 NOTE — PROGRESS NOTE ADULT - ASSESSMENT
43M with hx of alcohol use disorder and PAD, presented as a transfer from [facility] for acute decompensated liver and respiratory failure and possible ECMO eval. Nephrology service consulted for LUBA and volume overload.

## 2024-05-02 NOTE — CHART NOTE - NSCHARTNOTEFT_GEN_A_CORE
: Princess Dennis     INDICATION: critical illness, hypoxemic respiratory failure     PROCEDURE:  [ x ] LIMITED ECHO  [ x ] LIMITED CHEST  [ ] LIMITED RETROPERITONEAL  [ x ] LIMITED ABDOMINAL  [ ] LIMITED DVT  [ ] NEEDLE GUIDANCE VASCULAR  [ ] NEEDLE GUIDANCE THORACENTESIS  [ ] NEEDLE GUIDANCE PARACENTESIS  [ ] NEEDLE GUIDANCE PERICARDIOCENTESIS  [ ] OTHER    FINDINGS:  B line predominant in bilateral lung fields. R basilar consolidative pattern.  Gut peristalsis noted.  Bladder with small urine.     INTERPRETATION:  Abnormal aeration pattern in bilateral lung fields with possible consolidative pathology c/f PNA vs atelectasis in R base.   Intact bowel function.      Performed with Dr. Fragoso at bedside. : Celio Donato MD    INDICATION: critical illness, hypoxemic respiratory failure     PROCEDURE:  [ x ] LIMITED ECHO  [ x ] LIMITED CHEST  [ ] LIMITED RETROPERITONEAL  [ x ] LIMITED ABDOMINAL  [ ] LIMITED DVT  [ ] NEEDLE GUIDANCE VASCULAR  [ ] NEEDLE GUIDANCE THORACENTESIS  [ ] NEEDLE GUIDANCE PARACENTESIS  [ ] NEEDLE GUIDANCE PERICARDIOCENTESIS  [ ] OTHER    FINDINGS:  B line predominant in bilateral lung fields. R basilar consolidative pattern.  Gut peristalsis noted.  Bladder with small urine.   Normal LV systolic function. RV< LV    INTERPRETATION:  Abnormal aeration pattern in bilateral lung fields with possible consolidative pathology c/f PNA vs atelectasis in R base.   Intact bowel function.      Performed with Dr. Fragoso at bedside.    I have assisted and supervised entire procedure.     Celio Fragoso MD.

## 2024-05-02 NOTE — PROGRESS NOTE ADULT - SUBJECTIVE AND OBJECTIVE BOX
Interval Events:   Per ICU team, pt is stable from a respiratory standpoint. Increased OG output c/f reduced GI motility related to critical illness and narcotic/paralytic use, weaning narcotics and introducing bowel regimen  Liver chemistries are relatively stable, MELD 43  Patient remains intubated, sedated on vasopressors and CRRT    Hospital Medications:  albuterol/ipratropium for Nebulization 3 milliLiter(s) Nebulizer every 6 hours  chlorhexidine 0.12% Liquid 15 milliLiter(s) Oral Mucosa every 12 hours  chlorhexidine 2% Cloths 1 Application(s) Topical daily  CRRT Treatment    <Continuous>  dexMEDEtomidine Infusion 0.3 MICROgram(s)/kG/Hr IV Continuous <Continuous>  fentaNYL   Infusion..... 3.002 MICROgram(s)/kG/Hr IV Continuous <Continuous>  folic acid 1 milliGRAM(s) Oral daily  heparin   Injectable 5000 Unit(s) SubCutaneous every 12 hours  ketamine Infusion. 0.501 mG/kG/Hr IV Continuous <Continuous>  multivitamin/minerals/iron Oral Solution (CENTRUM) 15 milliLiter(s) Oral daily  mupirocin 2% Ointment 1 Application(s) Both Nostrils every 12 hours  norepinephrine Infusion 0.14 MICROgram(s)/kG/Min IV Continuous <Continuous>  pantoprazole  Injectable 40 milliGRAM(s) IV Push two times a day  petrolatum Ophthalmic Ointment 1 Application(s) Both EYES two times a day  Phoxillum Filtration BK 4 / 2.5 5000 milliLiter(s) CRRT <Continuous>  Phoxillum Filtration BK 4 / 2.5 5000 milliLiter(s) CRRT <Continuous>  Phoxillum Filtration BK 4 / 2.5 5000 milliLiter(s) CRRT <Continuous>  piperacillin/tazobactam IVPB.. 4.5 Gram(s) IV Intermittent every 8 hours  polyethylene glycol 3350 17 Gram(s) Oral two times a day  propofol Infusion 49.946 MICROgram(s)/kG/Min IV Continuous <Continuous>  senna 2 Tablet(s) Oral at bedtime  thiamine 100 milliGRAM(s) Oral daily    ROS: unable to obtain    PHYSICAL EXAM:   Vital Signs:  Vital Signs Last 24 Hrs  T(C): 37.3 (02 May 2024 08:00), Max: 37.9 (02 May 2024 00:00)  T(F): 99.1 (02 May 2024 08:00), Max: 100.3 (02 May 2024 00:00)  HR: 89 (02 May 2024 10:30) (66 - 95)  BP: --  BP(mean): --  RR: 28 (02 May 2024 10:00) (28 - 28)  SpO2: 98% (02 May 2024 10:30) (93% - 100%)    Parameters below as of 02 May 2024 10:00  Patient On (Oxygen Delivery Method): ventilator    O2 Concentration (%): 40    GENERAL:  intubated, sedated  RESPIRATORY:  MV via ETT  CARDIAC:  on vasopressors  ABDOMEN:  Soft, non-distended  SKIN:  Jaundiced  NEURO:  sedated    LABS:                        8.0    13.41 )-----------( 84       ( 02 May 2024 03:00 )             24.3     Mean Cell Volume: 91.7 fL (05-02-24 @ 03:00)    05-02    133<L>  |  100  |  30<H>  ----------------------------<  96  3.8   |  20<L>  |  1.33<H>    Ca    8.2<L>      02 May 2024 03:00  Phos  2.4     05-02  Mg     2.40     05-02    TPro  6.8  /  Alb  3.5  /  TBili  23.9<H>  /  DBili  x   /  AST  173<H>  /  ALT  55<H>  /  AlkPhos  63  05-02    LIVER FUNCTIONS - ( 02 May 2024 03:00 )  Alb: 3.5 g/dL / Pro: 6.8 g/dL / ALK PHOS: 63 U/L / ALT: 55 U/L / AST: 173 U/L / GGT: x           PT/INR - ( 02 May 2024 03:00 )   PT: 33.2 sec;   INR: 3.03 ratio         PTT - ( 02 May 2024 03:00 )  PTT:34.7 sec

## 2024-05-02 NOTE — PROGRESS NOTE ADULT - ASSESSMENT
43M with hx of alcohol use disorder, restless leg syndrome, PVD, anxiety/PTSD ( service), PAD, taking adderall at home, presented as a transfer from [facility] ED for acute decompensated liver and respiratory failure and possible ECMO eval.    NEURO  # encephalopathy  - Pt encephalopathic on admission likely metabolic vs hepatic encephalopathy (ammonia 94). Intubated for airway protection  - currently sedated (on fent, prop, ketamine); paralytics dc'd 5/1  - maintain RAAS -4 to -5 to maintain vent synchrony, wean per ICU protocol; wean fentanyl first    #Seizures   - no documented history of seizures, appeared to be having seizure like activity during rounds 4/27  - s/o veeg negative  - monitor for now    CARDS  # shock  - likely vasoplegia iso sedation, currently on levo, stable. Vasopressin dc'd overngiht  - wean vasopressors per ICU protocol  - maintain MAP 65-70  - abx as below  - TTE 4/27 EF 64%, LAE, mild AR, mild MR    #Aortic calcification/?Bicuspid aortic valve  - Pt with aortic calcification on echo w/ possible bicuspid valve. +systolic murmur  - Echo w/o vegetation    PULM  # ARDS  - P/F reported to be 70 at [facility], reflecting severe ARDS. Now improved to 107  -- Peak pressure 36 w/ Plateau 32; PEEP decreased to 10 and subsequently peak pressure 32 and plateau 29; modify vent setting per blood gas  - POCUS w/ RLL consolidation. ARDS likely i/s/o PNA vs pneumonitis from hematemesis  - maintain lung protective ventilation, wean per ICU protocol  - s/p proning with improvement in oxygenation. Pt now w/ improving O2 requirements and plateau pressure  - duonebs q6    RENAL  # LUBA  - sCr on admit 1.86, baseline unclear. Now worsening 2.55. Pt with decreasing UO s/p bumex gtt and metalazone  - likely hepatorenal syndrome, vs ATN from shock  --- s/p albumin x2 days, on octreotide and levophed  - Renal protective measures: Please renally dose all medications; Avoid nephrotoxic medications (NSAIDS, IV contrast); Avoid ACEi and ARBs in the setting of LUBA; Maintain MAP >65;   - Nephro c/s for CVVH; net negative 100cc/hr  -- Pt now w/ worsening LUBA, pre-renal? May need to make adjustments to CVVH  - monitor strict I&O    GI  # hematemesis  - EGD at OSH noted blood clots in oropharynx, no obvious bleeding source, normal esophagus  - no epistaxis noted on exam  - 4/28 increased bloody output from OGT, no bloody output currently  - Hepatology following, no plan for EGD today  - protonix 40 mg IVP BID  - NPO    # acute liver failure  - likely i/s/o alcohol use; AST / ALT 2:1 ratio. Bilirubin 21   - Last EtOH use: reportedly 4/20/24 per documentation from [facility]  - Smooth muscle ab 1:20   - MELD-Na on admission: 33, trend    - Variceal status: normal esophagus on EGD at OSH  - Per hepatology discontinue NAC  -- If pt improves, may be a candidate for transplant given high MELD per hepatology  - GI following, will appreciate recs    #Bowel regimen  - Pt without BM, will give relistor i/s/o fent use    ID  # Sepsis/septic shock  - POCUS w/ RLL consolidation, possible cause of infection  - UCx and BCx NGTD,   - s/p vanco and azithro, d/c iso neg legionella and mrsa  - c/w zosyn (4/27 - )  - f/u fungal work-up and c/w diflucan empirically per hepatology recommendation    ENDO  #Low TSH  - TSH 0.10, T4 WNL    HEME/ONC  # bicytopenia (anemia and thrombocytopenia)  # elevated INR  - likely iso liver failure vs acute bleed at OSH  - s/p 4 u prbc and 2 u FFP at OSH. received 1 u prbc, 1 u plt, and 1 u FFP on 4/27  - Plan for shiley placement and exchange of lines  -- s/p additional 3u FFP now, 1u plt during shiley palcement, and 1u pRBC 4/29  - transfuse for hb <7, plt <50 for bleeding  - monitor fibrinogen QD  - Vitamin K x2 (4/28-4/29)    #Portal vein thrombosis  - Evidence of portal vein thrombosis on US abdomen  - hold AC at this time iso upper GIB    DERM  # jaundice  - likely iso elevated bilirubin, acute liver failure    #B/l LE edema  - VA duplex negative    DVT: hold for now given anemia/hematemesis, SCD  FENGI: NPO for now given GIB  LDA: L IJ and radial a-line and CORINNA lowe  GTT: prop, fent, levo, vaso, ketamine  GOC: Full code for now, family: father Justice and has a brother. Case discussed with father and updates given.   43M with hx of alcohol use disorder, restless leg syndrome, PVD, anxiety/PTSD ( service), PAD, taking adderall at home, presented as a transfer from [facility] ED for acute decompensated liver and respiratory failure and possible ECMO eval.    NEURO  # encephalopathy  - Pt encephalopathic on admission likely metabolic vs hepatic encephalopathy (ammonia 94). Intubated for airway protection  - currently sedated (on fent, prop, ketamine); paralytics dc'd 5/1  - maintain RAAS -4 to -5 to maintain vent synchrony, wean per ICU protocol; wean fentanyl first    #Seizures   - no documented history of seizures, appeared to be having seizure like activity during rounds 4/27  - s/o veeg negative  - monitor for now    CARDS  # shock  - likely vasoplegia iso sedation, currently on levo, stable. Vasopressin dc'd overngiht  - wean vasopressors per ICU protocol  - maintain MAP 65-70  - abx as below  - TTE 4/27 EF 64%, LAE, mild AR, mild MR    #Aortic calcification/?Bicuspid aortic valve  - Pt with aortic calcification on echo w/ possible bicuspid valve. +systolic murmur  - Echo w/o vegetation    PULM  # ARDS  - P/F reported to be 70 at [facility], reflecting severe ARDS. Now improved to 107  -- Peak pressure 36 w/ Plateau 32; PEEP decreased to 10 and subsequently peak pressure 32 and plateau 29; modify vent setting per blood gas  - POCUS w/ RLL consolidation. ARDS likely i/s/o PNA vs pneumonitis from hematemesis  - maintain lung protective ventilation, wean per ICU protocol  - s/p proning with improvement in oxygenation. Pt now w/ improving O2 requirements and plateau pressure  - duonebs q6    RENAL  # LUBA  - sCr on admit 1.86, baseline unclear. Now worsening 2.55. Pt with decreasing UO s/p bumex gtt and metalazone  - likely hepatorenal syndrome, vs ATN from shock  --- s/p albumin x2 days, on octreotide and levophed  - Renal protective measures: Please renally dose all medications; Avoid nephrotoxic medications (NSAIDS, IV contrast); Avoid ACEi and ARBs in the setting of LUBA; Maintain MAP >65;   - Nephro c/s for CVVH; net negative 100cc/hr  - monitor strict I&O    GI  # hematemesis  - EGD at OSH noted blood clots in oropharynx, no obvious bleeding source, normal esophagus  - no epistaxis noted on exam  - 4/28 increased bloody output from OGT, no bloody output currently  - Hepatology following, no plan for EGD today  - protonix 40 mg IVP BID  - NPO    # acute liver failure  - likely i/s/o alcohol use; AST / ALT 2:1 ratio. Bilirubin 21   - Last EtOH use: reportedly 4/20/24 per documentation from [facility]  - Smooth muscle ab 1:20   - MELD-Na on admission: 33, trend    - Variceal status: normal esophagus on EGD at OSH  - Per hepatology discontinue NAC  -- If pt improves, may be a candidate for transplant given high MELD per hepatology  - GI following, will appreciate recs    #Bowel regimen  - Pt without BM, will give relistor i/s/o fent use    ID  # Sepsis/septic shock  - POCUS w/ RLL consolidation, possible cause of infection  - UCx and BCx NGTD,   - s/p vanco and azithro, d/c iso neg legionella and mrsa  - c/w zosyn (4/27 - )  - Fungitell 200, can be falsely elevated. Will dc fluconazole given elevated LFTs    ENDO  #Low TSH  - TSH 0.10, T4 WNL    HEME/ONC  # bicytopenia (anemia and thrombocytopenia)  # elevated INR  - likely iso liver failure vs acute bleed at OSH  - s/p 4 u prbc and 2 u FFP at OSH. received 1 u prbc, 1 u plt, and 1 u FFP on 4/27  - Plan for shiley placement and exchange of lines  -- s/p additional 3u FFP now, 1u plt during shiley palcement, and 1u pRBC 4/29  - transfuse for hb <7, plt <50 for bleeding  - monitor fibrinogen QD  - Vitamin K x2 (4/28-4/29)    #Portal vein thrombosis  - Evidence of portal vein thrombosis on US abdomen  - hold AC at this time iso upper GIB, started ppx    DERM  # jaundice  - likely iso elevated bilirubin, acute liver failure    #B/l LE edema  - VA duplex negative    DVT: start subq heparin  FENGI: tube feeds  LDA: L IJ and radial a-line and R shiley  GTT: prop, fent, levo, vaso, ketamine  GOC: Full code for now, family: father Justice and has a brother. Case discussed with father and updates given.

## 2024-05-02 NOTE — PROGRESS NOTE ADULT - PROBLEM SELECTOR PLAN 1
Patient with LUBA iso decompensated liver cirrhosis. Found to be anemic and thrombocytopenic. No obvious bleeding source on EGD. Patient developed ARDS due to liver failure and receiving multiple blood products, remains intubated. He was on Levo, Vaso, Octreotide, and Bumex gtts; now only on Levo. Was non-oliguric, but BUN/Cr continued to rise from 46/1.86 on admission to 70/2.55 on 4/29. Also was acidotic with pH 7.26. Urine studies significant for very low Na<20 c/w HRS as well. Despite adequate UOP initially, pt remained net positive and in ARDS. Therefore, CRRT initiated on 4/29 for fluid removal and removal of ammonia (94). Consent obtained from the father. Tolerating UF 100mL/h without increasing pressor requirements (in fact Vaso discontinued), now net negative 1.6L/24h and p/f ratio improving per MICU. POCUS on 5/2 still B-line predominant and bladder scan showed minimal urine. Will continue CRRT with goal net negative.  However, overall prognosis remains guarded.      Jolanta Mata, DO  Nephrology Fellow  Feel free to contact me directly on TEAMS with any questions.  (After 5pm or on weekends, please call the on-call fellow).

## 2024-05-03 NOTE — PROCEDURE NOTE - PROCEDURE DATE TIME, MLM
27-Apr-2024 16:31
04-May-2024 20:07
29-Apr-2024 20:31
29-Apr-2024 20:30
29-Apr-2024 20:31
04-May-2024 20:07

## 2024-05-03 NOTE — PROCEDURE NOTE - NSBRONCHPROCDETAILS_GEN_A_CORE_FT
Findings:  Bronchoscope inserted through ETT. ETT noted to be in good position. Airway evaluation revealed thick dark red/black secretions throughout which were therapeutically suctioned with lavage.  Bronchoscope then withdrawn from ETT.

## 2024-05-03 NOTE — PROCEDURE NOTE - NSINDICATIONS_GEN_A_CORE
arterial puncture to obtain ABG's/blood sampling/cannulation purposes/critical patient
dialysis/CRRT
critical illness/emergency venous access
airway protection/respiratory failure

## 2024-05-03 NOTE — PROCEDURE NOTE - NSPROCNAME_GEN_A_CORE
Subjective   Patient ID: Jose is a 14 year old male who is accompanied by:with Grandparent(s)    Chief Complaint   Patient presents with   • Headache     Headaches since last week Thursday.            Historian patient     Child with headache since 08-09-23 while in [place] -- covid negative- urgent care on 08-12-23 and dx with ear infection and started on amoxicillin-- with the headaches child has eye pain, light sensitivity, weakness and vomiting per mom's phone call -- per child has not had a headache since being seen in the acute care and on the amoxicillin    No night waking with the headaches    Child was seen on 02-20-23 for closed head injury without LOC     Reviewed prior notes and labs/imaging results     Child is  vaccinated for covid    Fever no  Runny noseNo  Sore throat   Cough No    With Gagging    With Vomiting   Wheeze No  Headache no longer   Ear pain No   Eye discharge No  Rash No   Vomiting No only with the headaches   Diarrhea No   Number    Loose    Watery   Abdominal pain   Po intake decreased No   Sleep disturbance No  Lack of taste or smell   Foreign/recent  travel No and no exposure to covid      Headache  Headache pattern:  Headache sometimes there, sometimes not at all  Initial event:  Infection  Other event details:  Dx with ear infection 3 days later and headaches resolved after starting antibiotics   Do headaches wake patient from sleep?: No      Review of Systems   Constitutional: Negative.  Negative for appetite change and fever.   HENT: Negative.  Negative for congestion, ear pain, mouth sores, rhinorrhea, sore throat and trouble swallowing.    Eyes: Negative.  Negative for discharge, redness and itching.   Respiratory: Negative.  Negative for cough, chest tightness, shortness of breath and wheezing.    Cardiovascular: Negative.  Negative for chest pain.   Gastrointestinal: Negative.  Negative for abdominal pain, constipation, diarrhea and vomiting.   Endocrine: Negative.  
  Genitourinary: Negative.  Negative for decreased urine volume and dysuria.   Musculoskeletal: Negative.  Negative for gait problem, neck pain and neck stiffness.   Skin: Negative.  Negative for rash.   Allergic/Immunologic: Negative.    Neurological: Positive for headaches.   Hematological: Negative.  Does not bruise/bleed easily.   Psychiatric/Behavioral: Negative.  Negative for sleep disturbance.       Jose has a past medical history of ADHD (attention deficit hyperactivity disorder).  Jose has Tourette's disorder; Acne vulgaris; and ADHD (attention deficit hyperactivity disorder) on their problem list.  Jose has no past surgical history on file.  His family history includes COPD in his maternal grandfather; Heart disease in his maternal grandmother; Miscarriages / Stillbirths in his mother.  Jose reports that he has never smoked. He has never been exposed to tobacco smoke. He has never used smokeless tobacco.  Jose   Current Outpatient Medications   Medication Sig Dispense Refill   • amoxicillin-clavulanate (AUGMENTIN) 875-125 MG per tablet Take 1 tablet by mouth in the morning and 1 tablet in the evening. Do all this for 10 days. 20 tablet 0   • methylpheniDATE ER (Concerta) 36 MG CR tablet      • lisdexamfetamine (VYVANSE) 50 MG capsule Take 1 capsule by mouth every morning. 30 capsule 0   • topiramate (TOPAMAX) 25 MG tablet TAKE 2 TABLETS BY MOUTH DAILY 60 tablet 0     No current facility-administered medications for this visit.     Jose has No Known Allergies.    Objective   Vitals:/69   Pulse 65   Temp 98.6 °F (37 °C) (Temporal)   Ht 5' 8.31\" (1.735 m)   Wt 74.5 kg (164 lb 3.9 oz)   BMI 24.75 kg/m²   BSA 1.89 m²   Physical Exam  Vitals and nursing note reviewed. Exam conducted with a chaperone present.   Constitutional:       General: He is not in acute distress.     Appearance: He is well-developed. He is not ill-appearing or toxic-appearing.   HENT:      Head: Atraumatic.      
Right Ear: Tympanic membrane, ear canal and external ear normal.      Left Ear: Tympanic membrane, ear canal and external ear normal.      Ears:      Comments: Right canal with small dried blood near the TM -- Dr Lotts assessed      Nose: Nose normal. No congestion or rhinorrhea.      Mouth/Throat:      Lips: Pink. No lesions.      Mouth: Mucous membranes are moist. No oral lesions.      Dentition: No gum lesions.      Tongue: No lesions.      Palate: No lesions.      Pharynx: Uvula midline. No oropharyngeal exudate or posterior oropharyngeal erythema.      Tonsils: No tonsillar exudate.      Neck: Full passive range of motion without pain, normal range of motion and neck supple.   Eyes:      General: Lids are normal. Gaze aligned appropriately. No scleral icterus.        Right eye: No discharge.         Left eye: No discharge.      Extraocular Movements: Extraocular movements intact.      Conjunctiva/sclera: Conjunctivae normal.      Pupils: Pupils are equal.      Funduscopic exam:     Right eye: Red reflex present.         Left eye: Red reflex present.  Cardiovascular:      Rate and Rhythm: Normal rate and regular rhythm.      Heart sounds: Normal heart sounds, S1 normal and S2 normal. No murmur heard.  Pulmonary:      Effort: Pulmonary effort is normal. No respiratory distress.      Breath sounds: Normal breath sounds. No stridor. No decreased breath sounds, wheezing, rhonchi or rales.   Chest:      Chest wall: No deformity or tenderness.   Musculoskeletal:         General: Normal range of motion.   Lymphadenopathy:      Cervical: No cervical adenopathy.   Skin:     General: Skin is warm and dry.      Capillary Refill: Capillary refill takes less than 2 seconds.      Findings: No rash.   Neurological:      Mental Status: He is alert and oriented to person, place, and time.      Motor: Motor function is intact.      Coordination: Coordination is intact. Coordination normal.      Gait: Gait is intact. Gait normal. 
  Psychiatric:         Attention and Perception: Attention normal.         Mood and Affect: Mood normal.         Speech: Speech normal.         Behavior: Behavior normal. Behavior is cooperative.         Assessment   Jose was seen today for headache.    Diagnoses and all orders for this visit:    Follow-up examination    Left otitis media, unspecified otitis media type- resolving     Headache-generalized- resolved         Plan   Advised of findings, complete oral antibiotics and monitor- if headache return - keep logs and follow up in our office  . Instructed to call if problem worsens or does not improve within the next 24 hours otherwise, Return if symptoms worsen or fail to improve..       
Bronchoscopy
Bronchoscopy
Tracheal Intubation
Arterial Puncture/Cannulation
Central Line Insertion
Central Line Insertion

## 2024-05-03 NOTE — PROGRESS NOTE ADULT - SUBJECTIVE AND OBJECTIVE BOX
[facility] DIVISION OF KIDNEY DISEASES AND HYPERTENSION   FOLLOW UP NOTE    --------------------------------------------------------------------------------    SUBJECTIVE / ROS / INTERVAL EVENTS:  - Patient seen and examined at bedside  - Remains on CRRT with 6L UG/24h for net negative ~3L, became hypotensive so vaso restarted overnight      PAST HISTORY  --------------------------------------------------------------------------------  No significant changes to PMH, PSH, FHx, SHx, unless otherwise noted    ALLERGIES & MEDICATIONS  --------------------------------------------------------------------------------  Allergies    Allergy Status Unknown      Standing Inpatient Medications  albuterol/ipratropium for Nebulization 3 milliLiter(s) Nebulizer every 6 hours  cefepime   IVPB 2000 milliGRAM(s) IV Intermittent every 8 hours  chlorhexidine 0.12% Liquid 15 milliLiter(s) Oral Mucosa every 12 hours  chlorhexidine 2% Cloths 1 Application(s) Topical daily  CRRT Treatment    <Continuous>  dexMEDEtomidine Infusion 0.3 MICROgram(s)/kG/Hr IV Continuous <Continuous>  dextrose 10%. 1000 milliLiter(s) IV Continuous <Continuous>  folic acid 1 milliGRAM(s) Oral daily  heparin   Injectable 5000 Unit(s) SubCutaneous every 12 hours  ketamine Infusion. 2.5 mG/kG/Hr IV Continuous <Continuous>  metroNIDAZOLE  IVPB 500 milliGRAM(s) IV Intermittent every 12 hours  multivitamin/minerals/iron Oral Solution (CENTRUM) 15 milliLiter(s) Oral daily  norepinephrine Infusion 0.05 MICROgram(s)/kG/Min IV Continuous <Continuous>  pantoprazole  Injectable 40 milliGRAM(s) IV Push two times a day  petrolatum Ophthalmic Ointment 1 Application(s) Both EYES two times a day  Phoxillum Filtration BK 4 / 2.5 5000 milliLiter(s) CRRT <Continuous>  polyethylene glycol 3350 17 Gram(s) Oral two times a day  PrismaSATE Dialysate BK 0 / 3.5 5000 milliLiter(s) CRRT <Continuous>  PrismaSOL Filtration BGK 4 / 2.5 5000 milliLiter(s) CRRT <Continuous>  propofol Infusion 50 MICROgram(s)/kG/Min IV Continuous <Continuous>  senna 2 Tablet(s) Oral at bedtime  thiamine 100 milliGRAM(s) Oral daily  vancomycin  IVPB 2000 milliGRAM(s) IV Intermittent once  vancomycin  IVPB 750 milliGRAM(s) IV Intermittent every 12 hours  vasopressin Infusion 0.04 Unit(s)/Min IV Continuous <Continuous>    PRN Inpatient Medications      VITALS  --------------------------------------------------------------------------------  T(C): 37.8 (05-03-24 @ 08:00), Max: 37.8 (05-02-24 @ 16:00)  HR: 107 (05-03-24 @ 11:00) (86 - 110)  BP: --  RR: 30 (05-03-24 @ 11:00) (28 - 30)  SpO2: 98% (05-03-24 @ 11:00) (70% - 100%)  Wt(kg): --      05-02-24 @ 07:01  -  05-03-24 @ 07:00  --------------------------------------------------------  IN: 3688.6 mL / OUT: 6784 mL / NET: -3095.4 mL    05-03-24 @ 07:01  -  05-03-24 @ 11:10  --------------------------------------------------------  IN: 859 mL / OUT: 460 mL / NET: 399 mL      PHYSICAL EXAM:  General: intubated, on pressors  Neuro: sedated  Pulmonary: coarse breath sounds, vented  Cardiovascular/Chest: +S1S2  : juanpablo  Extremities: ++LE edema  Skin: Warm and dry, +jaundice  Dialysis access: RIJ non-tunneled cath    LABS/STUDIES  --------------------------------------------------------------------------------              8.1    19.83 >-----------<  81       [05-03-24 @ 08:25]              25.4     136  |  102  |  17  ----------------------------<  40      [05-03-24 @ 08:25]  5.1   |  20  |  1.12        Ca     8.1     [05-03-24 @ 08:25]      iCa    1.12     [05-03 @ 08:25]      Mg     2.30     [05-03-24 @ 08:25]      Phos  5.7     [05-03-24 @ 08:25]    TPro  7.0  /  Alb  3.6  /  TBili  26.3  /  DBili  x   /  AST  257  /  ALT  86  /  AlkPhos  65  [05-03-24 @ 08:25]    PT/INR: PT 40.4 , INR 3.75       [05-03-24 @ 03:20]  PTT: 38.3       [05-03-24 @ 03:20]      Creatinine Trend:  SCr 1.12 [05-03 @ 08:25]  SCr 1.11 [05-03 @ 03:20]  SCr 1.24 [05-02 @ 22:00]  SCr 1.32 [05-02 @ 16:30]  SCr 1.30 [05-02 @ 10:30]    Urinalysis - [05-03-24 @ 08:25]      Color  / Appearance  / SG  / pH       Gluc 40 / Ketone   / Bili  / Urobili        Blood  / Protein  / Leuk Est  / Nitrite       RBC  / WBC  / Hyaline  / Gran  / Sq Epi  / Non Sq Epi  / Bacteria     Urine Creatinine 213      [04-27-24 @ 10:30]  Urine Protein 64      [04-27-24 @ 10:30]  Urine Sodium <20      [04-27-24 @ 10:30]  Urine Urea Nitrogen 660.2      [04-27-24 @ 10:30]  Urine Potassium 50.1      [04-27-24 @ 10:30]  Urine Osmolality 564      [04-27-24 @ 10:39]    HBsAb 19.6      [04-27-24 @ 15:50]  HBsAg Nonreact      [04-27-24 @ 15:50]  HBcAb Nonreact      [04-27-24 @ 15:50]  HCV 0.24, Nonreact      [04-27-24 @ 05:00]  HIV Nonreact      [04-27-24 @ 18:56]    ERLIN: titer Negative, pattern --      [04-27-24 @ 15:50]  dsDNA 1      [04-27-24 @ 22:23]  C3 Complement 66      [04-27-24 @ 22:23]  C4 Complement 12      [04-30-24 @ 00:20]  ANCA: cANCA Negative, pANCA Negative, atypical ANCA Indeterminate Method interference due to ERLIN fluorescence      [04-27-24 @ 22:23]  anti-GBM <0.2      [04-27-24 @ 22:23]  Syphilis Screen (Treponema Pallidum Ab) Negative      [04-27-24 @ 15:50]  Free Light Chains: kappa 12.79, lambda 4.83, ratio = 2.65      [04-27 @ 15:50]    CMV PCRCMVPCR Log: NotDetec Yib49BA/mL (04-27 @ 16:37)    Parvo PCRParvovirus B19 DNA by PCR: NotDetec IU/mL (04-27 @ 15:41)

## 2024-05-03 NOTE — PROGRESS NOTE ADULT - ASSESSMENT
43M with hx of alcohol use disorder, restless leg syndrome, PVD, anxiety/PTSD ( service), PAD, taking adderall at home, presented as a transfer from [facility] ED for acute decompensated liver and respiratory failure and possible ECMO eval.    NEURO  # encephalopathy  - Pt encephalopathic on admission likely metabolic vs hepatic encephalopathy (ammonia 94). Intubated for airway protection  - s/p paralytic 5/1  - currently sedated (on fent, prop, ketamine)  - maintain RAAS -2 to -3, wean per ICU protocol; weaning fentanyl first    #Seizures   - no documented history of seizures, appeared to be having seizure like activity during rounds 4/27  - s/o veeg negative  - monitor for now    CARDS  # shock  - likely vasoplegia iso sedation, currently on levo and vaso, stable.  - wean vasopressors per ICU protocol  - maintain MAP 65-70  - abx as below  - TTE 4/27 EF 64%, LAE, mild AR, mild MR    #Aortic calcification/?Bicuspid aortic valve  - Pt with aortic calcification on echo w/ possible bicuspid valve. +systolic murmur  - Echo w/o vegetation    PULM  # ARDS  - P/F reported to be 70 at [facility], reflecting severe ARDS. Now improved to 107  -- Peak pressure 36 w/ Plateau 32; PEEP decreased to 10 and subsequently peak pressure 32 and plateau 29; modify vent setting per blood gas  - POCUS w/ RLL consolidation. ARDS likely i/s/o PNA vs pneumonitis from hematemesis  - maintain lung protective ventilation, wean per ICU protocol  - s/p proning with improvement in oxygenation. Pt now w/ improving O2 requirements and plateau pressure  - duonebs q6    RENAL  # LUBA  - sCr on admit 1.86, baseline unclear. Now worsening 2.55. Pt with decreasing UO s/p bumex gtt and metalazone  - likely hepatorenal syndrome, vs ATN from shock  --- s/p albumin x2 days, on octreotide and levophed  - Renal protective measures: Please renally dose all medications; Avoid nephrotoxic medications (NSAIDS, IV contrast); Avoid ACEi and ARBs in the setting of LUBA; Maintain MAP >65;   - Nephro c/s for CVVH; net negative 100cc/hr  - monitor strict I&O    GI  # hematemesis  - EGD at OSH noted blood clots in oropharynx, no obvious bleeding source, normal esophagus  - no epistaxis noted on exam  - 4/28 increased bloody output from OGT, no bloody output currently  - Hepatology following, no plan for EGD today  - protonix 40 mg IVP BID  - NPO    # acute liver failure  - likely i/s/o alcohol use; AST / ALT 2:1 ratio. Bilirubin 21   - Last EtOH use: reportedly 4/20/24 per [facility] [place] [place]  - Smooth muscle ab 1:20   - MELD-Na on admission: 33, trend    - Variceal status: normal esophagus on EGD at OSH  - Per hepatology discontinue NAC  -- If pt improves, may be a candidate for transplant given high MELD per hepatology  - GI following, will appreciate recs    #Bowel regimen  - Pt without BM, will give relistor i/s/o fent use    ID  # Sepsis/septic shock  - POCUS w/ RLL consolidation, possible cause of infection  - UCx and BCx NGTD,   - s/p vanco and azithro, d/c iso neg legionella and mrsa  - c/w zosyn (4/27 - )  - Fungitell 200, can be falsely elevated. Will dc fluconazole given elevated LFTs    ENDO  #Low TSH  - TSH 0.10, T4 WNL    #hypoglycemia  - likely i/s/o liver failure and feed being held for gut motility issue  -- increase tube feeds, if not will start on standing d50    HEME/ONC  # bicytopenia (anemia and thrombocytopenia)  # elevated INR  - likely iso liver failure vs acute bleed at OSH  - s/p 4 u prbc and 2 u FFP at OSH. received 1 u prbc, 1 u plt, and 1 u FFP on 4/27  - Plan for shiley placement and exchange of lines  -- s/p additional 3u FFP now, 1u plt during shiley palcement, and 1u pRBC 4/29  - transfuse for hb <7, plt <50 for bleeding  - monitor fibrinogen QD  - Vitamin K x2 (4/28-4/29)    #Portal vein thrombosis  - Evidence of portal vein thrombosis on US abdomen  - hold AC at this time iso upper GIB, started ppx    DERM  # jaundice  - likely iso elevated bilirubin, acute liver failure    #B/l LE edema  - VA duplex negative    DVT: start subq heparin  FENGI: tube feeds  LDA: L IJ and radial a-line and R shiley  GTT: prop, fent, levo, vaso, ketamine  GOC: Full code for now, family: father Justice and has a brother. Case discussed with father and updates given.   43M with hx of alcohol use disorder, restless leg syndrome, PVD, anxiety/PTSD ( service), PAD, taking adderall at home, presented as a transfer from [facility] ED for acute decompensated liver and respiratory failure and possible ECMO eval.    NEURO  # encephalopathy  - Pt encephalopathic on admission likely metabolic vs hepatic encephalopathy (ammonia 94). Intubated for airway protection  - s/p paralytic 5/1  - currently sedated (on fent, prop, ketamine)  - maintain RAAS -2 to -3, wean per ICU protocol; weaning fentanyl first    #Seizures   - no documented history of seizures, appeared to be having seizure like activity during rounds 4/27  - s/o veeg negative  - monitor for now    CARDS  # shock  - likely vasoplegia iso sedation, currently on levo and vaso, stable.  - wean vasopressors per ICU protocol  - maintain MAP 65-70  - abx as below  - TTE 4/27 EF 64%, LAE, mild AR, mild MR    #Aortic calcification/?Bicuspid aortic valve  - Pt with aortic calcification on echo w/ possible bicuspid valve. +systolic murmur  - Echo w/o vegetation    PULM  # ARDS  - P/F reported to be 70 at [facility], reflecting severe ARDS. Now improved to 107  -- Peak pressure 36 w/ Plateau 32; PEEP decreased to 10 and subsequently peak pressure 32 and plateau 29; modify vent setting per blood gas  - POCUS w/ RLL consolidation. ARDS likely i/s/o PNA vs pneumonitis from hematemesis  - maintain lung protective ventilation, wean per ICU protocol  - s/p proning with improvement in oxygenation. Pt now w/ improving O2 requirements and plateau pressure  - Airleak overnight, ETT changed today  - duonebs q6    RENAL  # LUBA  - sCr on admit 1.86, baseline unclear. Now worsening 2.55. Pt with decreasing UO s/p bumex gtt and metalazone  - likely hepatorenal syndrome, vs ATN from shock  --- s/p albumin x2 days, on octreotide and levophed  - Renal protective measures: Please renally dose all medications; Avoid nephrotoxic medications (NSAIDS, IV contrast); Avoid ACEi and ARBs in the setting of LUBA; Maintain MAP >65;   - Nephro c/s for CVVH; now net even  - monitor strict I&O    GI  # hematemesis  - EGD at OSH noted blood clots in oropharynx, no obvious bleeding source, normal esophagus  - no epistaxis noted on exam  - 4/28 increased bloody output from OGT, no bloody output currently  - Hepatology following, no plan for EGD today  - protonix 40 mg IVP BID  - NPO    # acute liver failure  - likely i/s/o alcohol use; AST / ALT 2:1 ratio. Bilirubin 21   - Last EtOH use: reportedly 4/20/24 [facility] [place]  - Smooth muscle ab 1:20   - MELD-Na on admission: 33, trend    - Variceal status: normal esophagus on EGD at OSH  - Per hepatology discontinue NAC  -- If pt improves, may be a candidate for transplant given high MELD per hepatology  - GI following, will appreciate recs    #Bowel regimen  - Pt without BM, s/p relistor i/s/o fent use; now off of fent  - c/w miralax and senna    ID  # Sepsis/septic shock  - POCUS w/ RLL consolidation, possible cause of infection  - UCx and BCx NGTD,   - s/p vanco and azithro, d/c iso neg legionella and mrsa  - previously on zosyn (4/27 - 5/3); see abx below  - Fungitell 200, can be falsely elevated. Will dc fluconazole given elevated LFTs  Pt n/ new fevers o/n 5/2, broaden to flagyl, cefepime, and vancomycin  - f/u infx work-up    ENDO  #Low TSH  - TSH 0.10, T4 WNL    #hypoglycemia  likely i/s/o liver failure and not on tube feeds  - start trickle feeds at 10cc  - D10 at 75cc, increase as tolerated    #R/o AI  - f/u AM cortisol    #hypoglycemia  - likely i/s/o liver failure and feed being held for gut motility issue  -- increase tube feeds, if not will start on standing d50    HEME/ONC  # bicytopenia (anemia and thrombocytopenia)  # elevated INR  - likely iso liver failure vs acute bleed at OSH  - s/p 4 u prbc and 2 u FFP at OSH. received 1 u prbc, 1 u plt, and 1 u FFP on 4/27  - Plan for shiley placement and exchange of lines  -- s/p additional 3u FFP now, 1u plt during shiley palcement, and 1u pRBC 4/29  - transfuse for hb <7, plt <50 for bleeding  - monitor fibrinogen QD  - Vitamin K x2 (4/28-4/29)    #Portal vein thrombosis  - Evidence of portal vein thrombosis on US abdomen  - hold AC at this time iso upper GIB, started ppx    DERM  # jaundice  - likely iso elevated bilirubin, acute liver failure    #B/l LE edema  - VA duplex negative    DVT: start subq heparin  FENGI: tube feeds  LDA: L IJ and radial a-line and R shiley  GTT: prop, fent, levo, vaso, ketamine  GOC: Full code for now, family: father Justice and has a brother. Case discussed with father and updates given.

## 2024-05-03 NOTE — PROGRESS NOTE ADULT - SUBJECTIVE AND OBJECTIVE BOX
Carlos Ponce  PGY-3 | Internal Medicine      INTERVAL HPI/OVERNIGHT EVENTS: Pt w/ hypoglycemia given D50 x2. Also briefly hypoxia due to airleak. Vaso restarted o/n    SUBJECTIVE: Patient seen and examined at bedside.   Patient is intubated and sedated      OBJECTIVE:    VITAL SIGNS:  ICU Vital Signs Last 24 Hrs  T(C): 36.5 (03 May 2024 04:00), Max: 37.8 (02 May 2024 16:00)  T(F): 97.7 (03 May 2024 04:00), Max: 100.1 (02 May 2024 16:00)  HR: 97 (03 May 2024 06:00) (86 - 105)  BP: --  BP(mean): --  ABP: 104/43 (03 May 2024 06:00) (99/35 - 147/72)  ABP(mean): 62 (03 May 2024 06:00) (57 - 104)  RR: 28 (03 May 2024 06:00) (28 - 28)  SpO2: 96% (03 May 2024 06:00) (70% - 100%)    O2 Parameters below as of 03 May 2024 06:00  Patient On (Oxygen Delivery Method): ventilator    O2 Concentration (%): 60      Mode: AC/ CMV (Assist Control/ Continuous Mandatory Ventilation), RR (machine): 28, TV (machine): 400, FiO2: 60, PEEP: 8, ITime: 0.38, MAP: 15, PIP: 32    05-02 @ 07:01  -  05-03 @ 07:00  --------------------------------------------------------  IN: 3688.6 mL / OUT: 6284 mL / NET: -2595.4 mL      CAPILLARY BLOOD GLUCOSE      POCT Blood Glucose.: 124 mg/dL (03 May 2024 04:21)      PHYSICAL EXAM:    General: NAD, sedated  HEENT: reactive pupils, scleral icterus  Neck: supple  Respiratory: upper airway breath sounds  Cardiovascular: systolic ejection murmur  Abdomen: soft, NT/ND; +BS x4  Extremities: WWP, 2+ peripheral pulses b/l; 1+ BLE edema  Skin: Ecchymosis on left lower back  Neurological: sedated and paralyzed      MEDICATIONS:  MEDICATIONS  (STANDING):  albuterol/ipratropium for Nebulization 3 milliLiter(s) Nebulizer every 6 hours  chlorhexidine 0.12% Liquid 15 milliLiter(s) Oral Mucosa every 12 hours  chlorhexidine 2% Cloths 1 Application(s) Topical daily  CRRT Treatment    <Continuous>  dexMEDEtomidine Infusion 0.3 MICROgram(s)/kG/Hr (6.91 mL/Hr) IV Continuous <Continuous>  folic acid 1 milliGRAM(s) Oral daily  heparin   Injectable 5000 Unit(s) SubCutaneous every 12 hours  ketamine Infusion. 0.501 mG/kG/Hr (4.61 mL/Hr) IV Continuous <Continuous>  multivitamin/minerals/iron Oral Solution (CENTRUM) 15 milliLiter(s) Oral daily  norepinephrine Infusion 0.05 MICROgram(s)/kG/Min (4.32 mL/Hr) IV Continuous <Continuous>  pantoprazole  Injectable 40 milliGRAM(s) IV Push two times a day  petrolatum Ophthalmic Ointment 1 Application(s) Both EYES two times a day  Phoxillum Filtration BK 4 / 2.5 5000 milliLiter(s) (200 mL/Hr) CRRT <Continuous>  Phoxillum Filtration BK 4 / 2.5 5000 milliLiter(s) (2500 mL/Hr) CRRT <Continuous>  Phoxillum Filtration BK 4 / 2.5 5000 milliLiter(s) (2000 mL/Hr) CRRT <Continuous>  piperacillin/tazobactam IVPB.. 4.5 Gram(s) IV Intermittent every 8 hours  polyethylene glycol 3350 17 Gram(s) Oral two times a day  propofol Infusion 50 MICROgram(s)/kG/Min (27.6 mL/Hr) IV Continuous <Continuous>  senna 2 Tablet(s) Oral at bedtime  thiamine 100 milliGRAM(s) Oral daily  vasopressin Infusion 0.04 Unit(s)/Min (6 mL/Hr) IV Continuous <Continuous>    MEDICATIONS  (PRN):      ALLERGIES:  Allergies    Allergy Status Unknown    Intolerances        LABS:                        8.3    19.38 )-----------( 82       ( 03 May 2024 03:20 )             25.6     05-03    137  |  102  |  19  ----------------------------<  55<L>  4.4   |  19<L>  |  1.11    Ca    8.3<L>      03 May 2024 03:20  Phos  5.4     05-03  Mg     2.30     05-03    TPro  6.9  /  Alb  3.5  /  TBili  26.3<H>  /  DBili  x   /  AST  235<H>  /  ALT  76<H>  /  AlkPhos  59  05-03    PT/INR - ( 03 May 2024 03:20 )   PT: 40.4 sec;   INR: 3.75 ratio         PTT - ( 03 May 2024 03:20 )  PTT:38.3 sec  Urinalysis Basic - ( 03 May 2024 03:20 )    Color: x / Appearance: x / SG: x / pH: x  Gluc: 55 mg/dL / Ketone: x  / Bili: x / Urobili: x   Blood: x / Protein: x / Nitrite: x   Leuk Esterase: x / RBC: x / WBC x   Sq Epi: x / Non Sq Epi: x / Bacteria: x        RADIOLOGY & ADDITIONAL TESTS: Reviewed.

## 2024-05-03 NOTE — PROGRESS NOTE ADULT - ATTENDING COMMENTS
oliguric ATN   ARDS/ v/ol   on CRRT with UF as tolerated  increased pre-filter fluid to 2 liters to help with circuit clotting. no issues with circuit overnight.

## 2024-05-03 NOTE — PROCEDURE NOTE - NSINFORMCONSENT_GEN_A_CORE
This was an emergent procedure.
This was an emergent procedure.
Benefits, risks, and possible complications of procedure explained to patient/caregiver who verbalized understanding and gave verbal consent.
This was an emergent procedure.
Benefits, risks, and possible complications of procedure explained to patient/caregiver who verbalized understanding and gave written consent.
This was an emergent procedure.

## 2024-05-03 NOTE — PROGRESS NOTE ADULT - ATTENDING COMMENTS
1. Acute hypoxemic respiratory failure secondary to ARDS. Pt supined this afternoon. Tolerating FIO2 40% and 8peep. Will keep supined.    Off Nimbex.  Now off Fentanyl.  Try to decrease Ketamine as tolerated.     2. Renal. Acute renal failure  due to ATN vs hepatorenal failure. Continue CVVHD. Stop removing fluid today. Pt requiring more pressors.    3. GI/Liver:  Decompensated ETOH induced liver cirrhosis. Pt had EGD for Gi bleed. No active bleeding.  Continue  PPI.      4. Hypotension: Pt remains dependent on  Levophed and Vasopressin. Pressors decreasing.   Titrate for MAP> 60. Trial of stress steroids.    5.  DVT prophylaxis: SCD    6. GOC: Full code    ID. Continue Zosyn for pneumonia.

## 2024-05-03 NOTE — PROGRESS NOTE ADULT - ASSESSMENT
43M with hx of alcohol use disorder and PAD, presented as a transfer from [place] ED for acute decompensated liver and respiratory failure and possible ECMO eval. Nephrology service consulted for LUBA and volume overload.

## 2024-05-03 NOTE — PROGRESS NOTE ADULT - PROBLEM SELECTOR PLAN 1
Patient with LUBA iso decompensated liver cirrhosis. Found to be anemic and thrombocytopenic. No obvious bleeding source on EGD. Patient developed ARDS due to liver failure and receiving multiple blood products, remains intubated. He was on Levo, Vaso, Octreotide, and Bumex gtts; now only on Levo. Was non-oliguric, but BUN/Cr continued to rise from 46/1.86 on admission to 70/2.55 on 4/29. Also was acidotic with pH 7.26. Urine studies significant for very low Na<20 c/w HRS as well. Despite adequate UOP initially, pt remained net positive and in ARDS. Therefore, CRRT initiated on 4/29 for fluid removal and removal of ammonia (94). Consent obtained from the father. Was tolerating UF 100mL/h but increasing pressor requirements overnight, consider slowing UF rate. Pt now net negative 3L/24h and p/f ratio improving per MICU. POCUS on 5/2 still B-line predominant and bladder scan showed minimal urine. Will continue CRRT with goal net negative. Adjusting CRRT bags for potassium and phosphate imbalances. Will monitor labs closely. However, overall prognosis remains guarded.      Jolanta Mata DO  Nephrology Fellow  Feel free to contact me directly on TEAMS with any questions.  (After 5pm or on weekends, please call the on-call fellow).

## 2024-05-03 NOTE — PROGRESS NOTE ADULT - SUBJECTIVE AND OBJECTIVE BOX
Interval Events:   stable respiratory status, increase in pressor requirements thought to be due to excessive volume removal (CRRT)  remains intubated and sedated  MELD 45    Hospital Medications:  albuterol/ipratropium for Nebulization 3 milliLiter(s) Nebulizer every 6 hours  chlorhexidine 0.12% Liquid 15 milliLiter(s) Oral Mucosa every 12 hours  chlorhexidine 2% Cloths 1 Application(s) Topical daily  CRRT Treatment    <Continuous>  dexMEDEtomidine Infusion 0.3 MICROgram(s)/kG/Hr IV Continuous <Continuous>  dextrose 5%. 1000 milliLiter(s) IV Continuous <Continuous>  folic acid 1 milliGRAM(s) Oral daily  heparin   Injectable 5000 Unit(s) SubCutaneous every 12 hours  ketamine Infusion. 0.501 mG/kG/Hr IV Continuous <Continuous>  multivitamin/minerals/iron Oral Solution (CENTRUM) 15 milliLiter(s) Oral daily  norepinephrine Infusion 0.05 MICROgram(s)/kG/Min IV Continuous <Continuous>  pantoprazole  Injectable 40 milliGRAM(s) IV Push two times a day  petrolatum Ophthalmic Ointment 1 Application(s) Both EYES two times a day  Phoxillum Filtration BK 4 / 2.5 5000 milliLiter(s) CRRT <Continuous>  Phoxillum Filtration BK 4 / 2.5 5000 milliLiter(s) CRRT <Continuous>  Phoxillum Filtration BK 4 / 2.5 5000 milliLiter(s) CRRT <Continuous>  piperacillin/tazobactam IVPB.. 4.5 Gram(s) IV Intermittent every 8 hours  polyethylene glycol 3350 17 Gram(s) Oral two times a day  propofol Infusion 50 MICROgram(s)/kG/Min IV Continuous <Continuous>  senna 2 Tablet(s) Oral at bedtime  thiamine 100 milliGRAM(s) Oral daily  vasopressin Infusion 0.04 Unit(s)/Min IV Continuous <Continuous>      ROS unable to obtain    PHYSICAL EXAM:   Vital Signs:  Vital Signs Last 24 Hrs  T(C): 36.5 (03 May 2024 04:00), Max: 37.8 (02 May 2024 16:00)  T(F): 97.7 (03 May 2024 04:00), Max: 100.1 (02 May 2024 16:00)  HR: 107 (03 May 2024 09:17) (86 - 107)  BP: --  BP(mean): --  RR: 30 (03 May 2024 09:17) (28 - 30)  SpO2: 92% (03 May 2024 09:17) (70% - 100%)    Parameters below as of 03 May 2024 09:17  Patient On (Oxygen Delivery Method): ventilator    O2 Concentration (%): 60    GENERAL:  intubated, sedated  HEENT:  sclera icteric, OGT in place w minimal output  RESPIRATORY:  MV via ETT  CARDIAC:  on vasopressors  ABDOMEN:  Soft, distended  SKIN:  jaundiced  NEURO:  sedated    LABS:                        8.1    19.83 )-----------( 81       ( 03 May 2024 08:25 )             25.4     Mean Cell Volume: 96.2 fL (05-03-24 @ 08:25)    05-03    136  |  102  |  x   ----------------------------<  x   5.1   |  x   |  x     Ca    8.3<L>      03 May 2024 03:20  Phos  5.7     05-03  Mg     2.30     05-03    TPro  6.9  /  Alb  3.5  /  TBili  26.3<H>  /  DBili  x   /  AST  235<H>  /  ALT  76<H>  /  AlkPhos  59  05-03    LIVER FUNCTIONS - ( 03 May 2024 03:20 )  Alb: 3.5 g/dL / Pro: 6.9 g/dL / ALK PHOS: 59 U/L / ALT: 76 U/L / AST: 235 U/L / GGT: x           PT/INR - ( 03 May 2024 03:20 )   PT: 40.4 sec;   INR: 3.75 ratio         PTT - ( 03 May 2024 03:20 )  PTT:38.3 sec

## 2024-05-03 NOTE — PROGRESS NOTE ADULT - ASSESSMENT
Mr. Burns is a 43 year old male with alcohol use disorder, restless leg syndrome, PVD, anxiety/PTSD [facility] for respiratory failure and ECMO evaluation in the context of liver failure. Patient presented to OSH on 4/25 for chest pain with a hospital course c/b hematemesis, respiratory failure and hepatic dysfunction.     #Acute on chronic liver failure (ACLF)  #Alcohol-related cirrhosis  #ARDS  #Multi-organ failure   #Hematemesis, resolved   Patient with jaundice, coagulopathy and encephalopathy i/s/o alcohol use disorder and cirrhosis. Chronicity of underlying liver disease is unclear at this time. Suspect ACLF due to alcoholic hepatitis and underlying cirrhosis. RUQ US with hepatic steatosis and no ascites. CT at [facility] reports nodular liver and splenomegaly, likely cirrhotic. Doppler US with possible PVT, however not noted on CT from [facility].  MELD 3.0 score is 34>>45. Patient underwent EGD at OSH for hematemesis with findings of PHG, normal esophagus and hematin in the gastric fundus without actively bleeding source. Esophageal varices were not reported. Gastric varices are not entirely excluded.  Tylenol level and salicylate level were undetectable on 4/27. Acute viral hepatitis panel negative. HBcAb nonreactive. ASMA/AMA negative. UTox negative. HIV/CMV/EBV negative. Ceruloplasmin normal. Ferritin elevation not c/w hemochromatosis. TTE with severely dilated LV, mildly enlarged RV. MDF >80, however not a candidate for prednisone therapy for alcoholic hepatitis due to recent bleeding and critical illness. Patient remains intubated, ventilated and sedated on multiple vasopressors. Now s/p proning and initiation of CVVHD 4/29.  SUNDAY-C ACLF Score is 67 (calculated 4/30), predicting an 82.2% probability of death at 1 month.    Recommendations:  - Hemodynamic and respiratory support per ICU team  - Okay for feeding via OG tube   - Continue empiric antimicrobials   - Agree with weaning narcotics and optimizing bowel regimen  - IV PPI BID  - Monitor for recurrent bleeding  - Maintain active T&S and transfuse blood products as needed  - Obtain OSH EGD report  - No anticoagulation for possible PVT noted on doppler as not detected on CT, patient with recent bleed and ongoing coagulopathy  - Trend CBC, CMP, INR, Mg, Phos  - CRRT per Nephrology  - Not a liver transplant candidate due to critical illness; will re-assess candidacy pending patient's clinical response and extubation     Jurgen Ross MD  Gastroenterology/Hepatology Fellow  Available via [employer] Teams  Pager: [phone]    On Weekdays after 5 PM to 8AM and Weekends/Holidays (All day)  For non-urgent consults, please email [email or web address] or [email or web address]  For urgent consults, please contact on call GI team.  See Deborah schedule ([facility]), [employer] system - [phone] ([facility]), or call hospital  ([facility]) Mr. Burns is a 43 year old male with alcohol use disorder, restless leg syndrome, PVD, anxiety/PTSD [facility] for respiratory failure and ECMO evaluation in the context of liver failure. Patient presented to OSH on 4/25 for chest pain with a hospital course c/b hematemesis, respiratory failure and hepatic dysfunction.     #Acute on chronic liver failure (ACLF)  #Alcohol-related cirrhosis  #ARDS  #Multi-organ failure   #Hematemesis, resolved   Patient with jaundice, coagulopathy and encephalopathy i/s/o alcohol use disorder and cirrhosis. Chronicity of underlying liver disease is unclear at this time. Suspect ACLF due to alcoholic hepatitis and underlying cirrhosis. RUQ US with hepatic steatosis and no ascites. CT at [facility] reports nodular liver and splenomegaly, likely cirrhotic. Doppler US with possible PVT, however not noted on CT from [facility].  MELD 3.0 score is 34>>45. Patient underwent EGD at OSH for hematemesis with findings of PHG, normal esophagus and hematin in the gastric fundus without actively bleeding source. Esophageal varices were not reported. Gastric varices are not entirely excluded.  Tylenol level and salicylate level were undetectable on 4/27. Acute viral hepatitis panel negative. HBcAb nonreactive. ASMA/AMA negative. UTox negative. HIV/CMV/EBV negative. Ceruloplasmin normal. Ferritin elevation not c/w hemochromatosis. TTE with severely dilated LV, mildly enlarged RV. MDF >80, however not a candidate for prednisone therapy for alcoholic hepatitis due to recent bleeding and critical illness. Patient remains intubated, ventilated and sedated on multiple vasopressors. Now s/p proning and initiation of CVVHD 4/29.  SUNDAY-C ACLF Score is 75 (calculated 5/3), predicting a 98% probability of death at 1 month.    Recommendations:  - Hemodynamic and respiratory support per ICU team  - Okay for feeding via OG tube   - Continue empiric antimicrobials   - Agree with weaning narcotics and optimizing bowel regimen  - IV PPI BID  - Monitor for recurrent bleeding  - Maintain active T&S and transfuse blood products as needed  - Obtain OSH EGD report  - No anticoagulation for possible PVT noted on doppler as not detected on CT, patient with recent bleed and ongoing coagulopathy  - Trend CBC, CMP, INR, Mg, Phos  - CRRT per Nephrology  - Not a liver transplant candidate due to critical illness; will re-assess candidacy pending patient's clinical response and extubation     Jurgen Ross MD  Gastroenterology/Hepatology Fellow  Available via [employer] Teams  Pager: [phone]    On Weekdays after 5 PM to 8AM and Weekends/Holidays (All day)  For non-urgent consults, please email [email or web address] or [email or web address]  For urgent consults, please contact on call GI team.  See Deborah wu ([facility]), [employer] system - [phone] ([facility]), or call hospital  ([facility])

## 2024-05-03 NOTE — PROCEDURE NOTE - NSPROCDETAILS_GEN_ALL_CORE
patient pre-oxygenated, tube inserted, placement confirmed
guidewire recovered/lumen(s) aspirated and flushed/sterile dressing applied/sterile technique, catheter placed/ultrasound guidance with use of sterile gel and probe cove
guidewire recovered/lumen(s) aspirated and flushed/sterile dressing applied/sterile technique, catheter placed/ultrasound guidance with use of sterile gel and probe cove
location identified, draped/prepped, sterile technique used, needle inserted/introduced/positive blood return obtained via catheter/connected to a pressurized flush line/sutured in place/Seldinger technique

## 2024-05-04 NOTE — PROGRESS NOTE ADULT - ASSESSMENT
43M with hx of alcohol use disorder, restless leg syndrome, PVD, anxiety/PTSD ( service), PAD, taking adderall at home, presented as a transfer from [facility] ED for acute decompensated liver and respiratory failure and possible ECMO eval.    NEURO  # encephalopathy  - Pt encephalopathic on admission likely metabolic vs hepatic encephalopathy (ammonia 94). Intubated for airway protection  - s/p paralytic 5/1  - currently sedated (on fent, prop, ketamine)  - maintain RAAS -2 to -3, wean per ICU protocol; weaning fentanyl first    #Seizures   - no documented history of seizures, appeared to be having seizure like activity during rounds 4/27  - s/o veeg negative  - monitor for now    CARDS  # shock  - likely vasoplegia iso sedation, currently on levo and vaso, stable.  - wean vasopressors per ICU protocol  - maintain MAP 65-70  - abx as below  - TTE 4/27 EF 64%, LAE, mild AR, mild MR    #Aortic calcification/?Bicuspid aortic valve  - Pt with aortic calcification on echo w/ possible bicuspid valve. +systolic murmur  - Echo w/o vegetation    PULM  # ARDS  - P/F reported to be 70 at [facility], reflecting severe ARDS. Now improved to 107  -- Peak pressure 36 w/ Plateau 32; PEEP decreased to 10 and subsequently peak pressure 32 and plateau 29; modify vent setting per blood gas  - POCUS w/ RLL consolidation. ARDS likely i/s/o PNA vs pneumonitis from hematemesis  - maintain lung protective ventilation, wean per ICU protocol  - s/p proning with improvement in oxygenation. Pt now w/ improving O2 requirements and plateau pressure  - Airleak overnight, ETT changed today  - duonebs q6    RENAL  # LUBA  - sCr on admit 1.86, baseline unclear. Now worsening 2.55. Pt with decreasing UO s/p bumex gtt and metalazone  - likely hepatorenal syndrome, vs ATN from shock  --- s/p albumin x2 days, on octreotide and levophed  - Renal protective measures: Please renally dose all medications; Avoid nephrotoxic medications (NSAIDS, IV contrast); Avoid ACEi and ARBs in the setting of LUBA; Maintain MAP >65;   - Nephro c/s for CVVH; now net even  - monitor strict I&O    GI  # hematemesis  - EGD at OSH noted blood clots in oropharynx, no obvious bleeding source, normal esophagus  - no epistaxis noted on exam  - 4/28 increased bloody output from OGT, no bloody output currently  - Hepatology following, no plan for EGD today  - protonix 40 mg IVP BID  - NPO    # acute liver failure  - likely i/s/o alcohol use; AST / ALT 2:1 ratio. Bilirubin 21   - Last EtOH use: reportedly 4/20/24 [facility]  - Smooth muscle ab 1:20   - MELD-Na on admission: 33, trend    - Variceal status: normal esophagus on EGD at OSH  - Per hepatology discontinue NAC  -- If pt improves, may be a candidate for transplant given high MELD per hepatology  - GI following, will appreciate recs    #Bowel regimen  - Pt without BM, s/p relistor i/s/o fent use; now off of fent  - c/w miralax and senna    ID  # Sepsis/septic shock  - POCUS w/ RLL consolidation, possible cause of infection  - UCx and BCx NGTD,   - s/p vanco and azithro, d/c iso neg legionella and mrsa  - previously on zosyn (4/27 - 5/3); see abx below  - Fungitell 200, can be falsely elevated. Will dc fluconazole given elevated LFTs  Pt n/ new fevers o/n 5/2, broaden to flagyl, cefepime, and vancomycin  - f/u infx work-up    ENDO  #Low TSH  - TSH 0.10, T4 WNL    #hypoglycemia  likely i/s/o liver failure and not on tube feeds  - start trickle feeds at 10cc  - D10 at 75cc, increase as tolerated    #R/o AI  - f/u AM cortisol    #hypoglycemia  - likely i/s/o liver failure and feed being held for gut motility issue  -- increase tube feeds, if not will start on standing d50    HEME/ONC  # bicytopenia (anemia and thrombocytopenia)  # elevated INR  - likely iso liver failure vs acute bleed at OSH  - s/p 4 u prbc and 2 u FFP at OSH. received 1 u prbc, 1 u plt, and 1 u FFP on 4/27  - Plan for shiley placement and exchange of lines  -- s/p additional 3u FFP now, 1u plt during shiley palcement, and 1u pRBC 4/29  - transfuse for hb <7, plt <50 for bleeding  - monitor fibrinogen QD  - Vitamin K x2 (4/28-4/29)    #Portal vein thrombosis  - Evidence of portal vein thrombosis on US abdomen  - hold AC at this time iso upper GIB, started ppx    DERM  # jaundice  - likely iso elevated bilirubin, acute liver failure    #B/l LE edema  - VA duplex negative    DVT: start subq heparin  FENGI: tube feeds  LDA: L IJ and radial a-line and R shiley  GTT: prop, fent, levo, vaso, ketamine  GOC: Full code for now, family: father Justice and has a brother. Case discussed with father and updates given.   43M with hx of alcohol use disorder, restless leg syndrome, PVD, anxiety/PTSD ( service), PAD, taking adderall at home, presented as a transfer from [facility] ED for acute decompensated liver and respiratory failure and possible ECMO eval.    NEURO  # encephalopathy  - Pt encephalopathic on admission likely metabolic vs hepatic encephalopathy (ammonia 94). Intubated for airway protection  - s/p paralytic 5/1  - currently sedated (on , prop, ketamine), fent weaned, now wean ketamine  - maintain RAAS -2 to -3, wean per ICU protocol;  - start lactulose 5/4    #Seizures   - no documented history of seizures, appeared to be having seizure like activity during rounds 4/27  - s/o veeg negative  - monitor for now    CARDS  # shock  - likely vasoplegia iso sedation, currently on levo and vaso, stable.  - wean vasopressors per ICU protocol  - maintain MAP 65-70  - abx as below  - TTE 4/27 EF 64%, LAE, mild AR, mild MR    #Aortic calcification/?Bicuspid aortic valve  - Pt with aortic calcification on echo w/ possible bicuspid valve. +systolic murmur  - Echo w/o vegetation    PULM  # ARDS  - P/F reported to be 70 at [facility], reflecting severe ARDS. Now improved to 107  -- Peak pressure 36 w/ Plateau 32; PEEP decreased to 10 and subsequently peak pressure 32 and plateau 29; modify vent setting per blood gas  - POCUS w/ RLL consolidation. ARDS likely i/s/o PNA vs pneumonitis from hematemesis  - maintain lung protective ventilation, wean per ICU protocol  - s/p proning with improvement in oxygenation. Pt now w/ improving O2 requirements and plateau pressure  - Airleak overnight, ETT changed today  - duonebs q6    RENAL  # renal failure, anuria  - sCr on admit 1.86, baseline unclear. Now worsening 2.55. Pt with decreasing UO s/p bumex gtt and metalazone  - likely hepatorenal syndrome, vs ATN from shock  --- s/p albumin x2 days, on octreotide and levophed  - Renal protective measures: Please renally dose all medications; Avoid nephrotoxic medications (NSAIDS, IV contrast); Avoid ACEi and ARBs in the setting of LUBA; Maintain MAP >65;   - Nephro c/s for CVVH;   - monitor strict I&O  - hold off on straight cath for now given elevated INR    GI  # hematemesis  - EGD at OSH noted blood clots in oropharynx, no obvious bleeding source, normal esophagus  - no epistaxis noted on exam  - 4/28 increased bloody output from OGT, no bloody output currently  - Hepatology following, no plan for EGD today  - protonix 40 mg IVP BID  - NPO    # acute liver failure  - likely i/s/o alcohol use; AST / ALT 2:1 ratio. Bilirubin 21   - Last EtOH use: reportedly 4/20/24 per documentation from [facility]  - Smooth muscle ab 1:20   - MELD-Na on admission: 33, trend    - Variceal status: normal esophagus on EGD at OSH  - Per hepatology discontinue NAC  -- If pt improves, may be a candidate for transplant given high MELD per hepatology  - GI following, will appreciate recs  - max tylenol 2000 mg daily    #Bowel regimen  - Pt without BM, s/p relistor i/s/o fent use; now off of fent  - c/w miralax and senna    ID  # Sepsis/septic shock  - POCUS w/ RLL consolidation, possible cause of infection  - UCx and BCx NGTD,   - s/p vanco and azithro, d/c iso neg legionella and mrsa  - previously on zosyn (4/27 - 5/3); see abx below  - Fungitell 200, can be falsely elevated. Will dc fluconazole given elevated LFTs  Pt n/ new fevers o/n 5/2, broaden to flagyl, cefepime, and vancomycin  - f/u infx work-up    ENDO  # hypocortisoliemia  - 8 AM cortisol 8.7  - solucortef 5/4    #Low TSH  - TSH 0.10, T4 WNL    #hypoglycemia  likely i/s/o liver failure and not on tube feeds  - start trickle feeds at 10cc  - D10 at 75cc, increase as tolerated    #R/o AI  - f/u AM cortisol    #hypoglycemia  - likely i/s/o liver failure and feed being held for gut motility issue  -- increase tube feeds, if not will start on standing d50    HEME/ONC  # bicytopenia (anemia and thrombocytopenia)  # elevated INR  - likely iso liver failure vs acute bleed at OSH  - s/p 4 u prbc and 2 u FFP at OSH. received 1 u prbc, 1 u plt, and 1 u FFP on 4/27  - Plan for shiley placement and exchange of lines  -- s/p additional 3u FFP now, 1u plt during shiley palcement, and 1u pRBC 4/29  - transfuse for hb <7, plt <50 for bleeding  - monitor fibrinogen QD  - Vitamin K x2 (4/28-4/29)    #Portal vein thrombosis  - Evidence of portal vein thrombosis on US abdomen  - hold AC at this time iso upper GIB, started ppx    DERM  # jaundice  - likely iso elevated bilirubin, acute liver failure    #B/l LE edema  - VA duplex negative    DVT: start subq heparin  FENGI: tube feeds  LDA: L IJ and radial a-line and R shiley  GTT: prop,, levo, vaso, ketamine  GOC: Full code for now, family: father Justice and has a brother. Case discussed with father and updates given.

## 2024-05-04 NOTE — PROGRESS NOTE ADULT - ATTENDING COMMENTS
43M with hx of alcohol use disorder, restless leg syndrome, PVD, anxiety/PTSD ( service), PAD, taking adderall at home, presented as a transfer from [facility] ED for encephalopathy, acute decompensated liver failure and respiratory failure/ARDS (P/F was 70, improving) ; Variceal status: normal esophagus on EGD at OSH. pt transferred to [facility] for possible ECMO eval, intubated, s/p proning, episode of seizures 4/27, negative EEG. + RLL Pna vs pneumonitis d/t hematemesis. S/p ETT exchanged and bronch 5/3/24 for air leak.  Peak pressure 36 w/ Plateau 32; PEEP decreased to 10 and subsequently peak pressure 32 and plateau 29; modify vent setting per blood gas. Continue vasopressor and ventilatory support. LUBA likely multifactorial - ATN, hepatorenal, CRRT started 4/29/24 for fluid removal, acidosis (was non-oliguric), continuing goal for net negative, improving oxygenation did not tolerate weaning down PEEP, FIO2 today. s/p vanco/azithro, restarted broaden to flagyl, cefepime, and vancomycin 5/2/24 for new fevers. UCx and BCx NGTD, f/u bronch cultures. + hypoglycemia in setting of AI, low am cortisol, start hydrocortisone.

## 2024-05-04 NOTE — PROGRESS NOTE ADULT - SUBJECTIVE AND OBJECTIVE BOX
[facility] DIVISION OF KIDNEY DISEASES AND HYPERTENSION   FOLLOW UP NOTE    --------------------------------------------------------------------------------    SUBJECTIVE / ROS / INTERVAL EVENTS:  - Patient seen and examined at bedside  - Had bronch with lavage of bloody secretions yesterday  - Remains critically ill, on pressors, intubated and sedated, anuric on CRRT      PAST HISTORY  --------------------------------------------------------------------------------  No significant changes to PMH, PSH, FHx, SHx, unless otherwise noted    ALLERGIES & MEDICATIONS  --------------------------------------------------------------------------------  Allergies    Allergy Status Unknown    Intolerances      Standing Inpatient Medications  albuterol/ipratropium for Nebulization 3 milliLiter(s) Nebulizer every 6 hours  cefepime   IVPB 2000 milliGRAM(s) IV Intermittent every 8 hours  chlorhexidine 0.12% Liquid 15 milliLiter(s) Oral Mucosa every 12 hours  chlorhexidine 2% Cloths 1 Application(s) Topical daily  CRRT Treatment    <Continuous>  dexMEDEtomidine Infusion 0.3 MICROgram(s)/kG/Hr IV Continuous <Continuous>  dextrose 10%. 1000 milliLiter(s) IV Continuous <Continuous>  folic acid 1 milliGRAM(s) Oral daily  heparin   Injectable 5000 Unit(s) SubCutaneous every 12 hours  ketamine Infusion. 2.5 mG/kG/Hr IV Continuous <Continuous>  metroNIDAZOLE  IVPB 500 milliGRAM(s) IV Intermittent every 12 hours  multivitamin/minerals/iron Oral Solution (CENTRUM) 15 milliLiter(s) Oral daily  norepinephrine Infusion 0.05 MICROgram(s)/kG/Min IV Continuous <Continuous>  pantoprazole  Injectable 40 milliGRAM(s) IV Push two times a day  petrolatum Ophthalmic Ointment 1 Application(s) Both EYES two times a day  Phoxillum Filtration BK 4 / 2.5 5000 milliLiter(s) CRRT <Continuous>  polyethylene glycol 3350 17 Gram(s) Oral two times a day  potassium chloride   Solution 40 milliEquivalent(s) Oral once  PrismaSATE Dialysate BK 0 / 3.5 5000 milliLiter(s) CRRT <Continuous>  PrismaSOL Filtration BGK 4 / 2.5 5000 milliLiter(s) CRRT <Continuous>  propofol Infusion 49.946 MICROgram(s)/kG/Min IV Continuous <Continuous>  senna 2 Tablet(s) Oral at bedtime  thiamine 100 milliGRAM(s) Oral daily  vancomycin  IVPB 750 milliGRAM(s) IV Intermittent every 12 hours  vasopressin Infusion 0.04 Unit(s)/Min IV Continuous <Continuous>    PRN Inpatient Medications      VITALS  --------------------------------------------------------------------------------  T(C): 37.4 (05-04-24 @ 08:00), Max: 39 (05-03-24 @ 16:00)  HR: 92 (05-04-24 @ 08:00) (92 - 110)  BP: --  RR: 28 (05-04-24 @ 08:00) (28 - 30)  SpO2: 96% (05-04-24 @ 08:00) (92% - 100%)  Wt(kg): --      05-03-24 @ 07:01  -  05-04-24 @ 07:00  --------------------------------------------------------  IN: 6250.4 mL / OUT: 7390 mL / NET: -1139.6 mL    05-04-24 @ 07:01  -  05-04-24 @ 09:15  --------------------------------------------------------  IN: 427.9 mL / OUT: 0 mL / NET: 427.9 mL      PHYSICAL EXAM:  General: intubated, on pressors  Neuro: sedated  Pulmonary: coarse breath sounds, vented  Cardiovascular/Chest: +S1S2  : juanpablo  Extremities: ++LE edema  Skin: Warm and dry, +jaundice  Dialysis access: RIJ non-tunneled cath    LABS/STUDIES  --------------------------------------------------------------------------------              7.6    20.72 >-----------<  82       [05-04-24 @ 08:00]              23.5     130  |  96  |  9   ----------------------------<  104      [05-04-24 @ 08:00]  3.7   |  21  |  1.01        Ca     8.2     [05-04-24 @ 08:00]      iCa    1.14     [05-04 @ 08:00]      Mg     2.00     [05-04-24 @ 08:00]      Phos  4.1     [05-04-24 @ 08:00]    TPro  6.4  /  Alb  3.2  /  TBili  27.4  /  DBili  x   /  AST  377  /  ALT  122  /  AlkPhos  68  [05-04-24 @ 08:00]    PT/INR: PT 61.3 , INR 5.71       [05-04-24 @ 04:11]  PTT: 49.0       [05-04-24 @ 04:11]      Creatinine Trend:  SCr 1.01 [05-04 @ 08:00]  SCr 1.03 [05-04 @ 04:11]  SCr 1.11 [05-03 @ 22:03]  SCr 1.14 [05-03 @ 15:45]  SCr 1.12 [05-03 @ 08:25]    Urinalysis - [05-04-24 @ 08:00]      Color  / Appearance  / SG  / pH       Gluc 104 / Ketone   / Bili  / Urobili        Blood  / Protein  / Leuk Est  / Nitrite       RBC  / WBC  / Hyaline  / Gran  / Sq Epi  / Non Sq Epi  / Bacteria     Urine Creatinine 213      [04-27-24 @ 10:30]  Urine Protein 64      [04-27-24 @ 10:30]  Urine Sodium <20      [04-27-24 @ 10:30]  Urine Urea Nitrogen 660.2      [04-27-24 @ 10:30]  Urine Potassium 50.1      [04-27-24 @ 10:30]  Urine Osmolality 564      [04-27-24 @ 10:39]    HBsAb 19.6      [04-27-24 @ 15:50]  HBsAg Nonreact      [04-27-24 @ 15:50]  HBcAb Nonreact      [04-27-24 @ 15:50]  HCV 0.24, Nonreact      [04-27-24 @ 05:00]  HIV Nonreact      [04-27-24 @ 18:56]    ERLIN: titer Negative, pattern --      [04-27-24 @ 15:50]  dsDNA 1      [04-27-24 @ 22:23]  C3 Complement 66      [04-27-24 @ 22:23]  C4 Complement 9      [05-03-24 @ 22:03]  ANCA: cANCA Negative, pANCA Negative, atypical ANCA Indeterminate Method interference due to ERLIN fluorescence      [04-27-24 @ 22:23]  anti-GBM <0.2      [04-27-24 @ 22:23]  Syphilis Screen (Treponema Pallidum Ab) Negative      [04-27-24 @ 15:50]  Free Light Chains: kappa 12.79, lambda 4.83, ratio = 2.65      [04-27 @ 15:50]  CMV PCRCMVPCR Log: NotDetec Ohy42UX/mL (04-27 @ 16:37)    Parvo PCRParvovirus B19 DNA by PCR: NotDetec IU/mL (04-27 @ 15:41)   [facility] DIVISION OF KIDNEY DISEASES AND HYPERTENSION   FOLLOW UP NOTE    --------------------------------------------------------------------------------  CC: Oliguric LUBA, on CRRT    SUBJECTIVE / ROS / INTERVAL EVENTS:  - Patient seen and examined at bedside  - Had bronch with lavage of bloody secretions yesterday  - Remains critically ill, on pressors, intubated and sedated, anuric on CRRT    PAST HISTORY  --------------------------------------------------------------------------------  No significant changes to PMH, PSH, FHx, SHx, unless otherwise noted    ALLERGIES & MEDICATIONS  --------------------------------------------------------------------------------  Allergies    Allergy Status Unknown    Intolerances    Standing Inpatient Medications  albuterol/ipratropium for Nebulization 3 milliLiter(s) Nebulizer every 6 hours  cefepime   IVPB 2000 milliGRAM(s) IV Intermittent every 8 hours  chlorhexidine 0.12% Liquid 15 milliLiter(s) Oral Mucosa every 12 hours  chlorhexidine 2% Cloths 1 Application(s) Topical daily  CRRT Treatment    <Continuous>  dexMEDEtomidine Infusion 0.3 MICROgram(s)/kG/Hr IV Continuous <Continuous>  dextrose 10%. 1000 milliLiter(s) IV Continuous <Continuous>  folic acid 1 milliGRAM(s) Oral daily  heparin   Injectable 5000 Unit(s) SubCutaneous every 12 hours  ketamine Infusion. 2.5 mG/kG/Hr IV Continuous <Continuous>  metroNIDAZOLE  IVPB 500 milliGRAM(s) IV Intermittent every 12 hours  multivitamin/minerals/iron Oral Solution (CENTRUM) 15 milliLiter(s) Oral daily  norepinephrine Infusion 0.05 MICROgram(s)/kG/Min IV Continuous <Continuous>  pantoprazole  Injectable 40 milliGRAM(s) IV Push two times a day  petrolatum Ophthalmic Ointment 1 Application(s) Both EYES two times a day  Phoxillum Filtration BK 4 / 2.5 5000 milliLiter(s) CRRT <Continuous>  polyethylene glycol 3350 17 Gram(s) Oral two times a day  potassium chloride   Solution 40 milliEquivalent(s) Oral once  PrismaSATE Dialysate BK 0 / 3.5 5000 milliLiter(s) CRRT <Continuous>  PrismaSOL Filtration BGK 4 / 2.5 5000 milliLiter(s) CRRT <Continuous>  propofol Infusion 49.946 MICROgram(s)/kG/Min IV Continuous <Continuous>  senna 2 Tablet(s) Oral at bedtime  thiamine 100 milliGRAM(s) Oral daily  vancomycin  IVPB 750 milliGRAM(s) IV Intermittent every 12 hours  vasopressin Infusion 0.04 Unit(s)/Min IV Continuous <Continuous>    PRN Inpatient Medications    VITALS  --------------------------------------------------------------------------------  T(C): 37.4 (05-04-24 @ 08:00), Max: 39 (05-03-24 @ 16:00)  HR: 92 (05-04-24 @ 08:00) (92 - 110)  BP: --  RR: 28 (05-04-24 @ 08:00) (28 - 30)  SpO2: 96% (05-04-24 @ 08:00) (92% - 100%)  Wt(kg): --      05-03-24 @ 07:01  -  05-04-24 @ 07:00  --------------------------------------------------------  IN: 6250.4 mL / OUT: 7390 mL / NET: -1139.6 mL    05-04-24 @ 07:01  -  05-04-24 @ 09:15  --------------------------------------------------------  IN: 427.9 mL / OUT: 0 mL / NET: 427.9 mL    PHYSICAL EXAM:  General: intubated, on IV pressors  Neuro: Sedated  Pulmonary: coarse breath sounds, vented  Cardiovascular/Chest: +S1S2  : Lin catheter seen  Extremities: B/L LE pitting edema  Skin: +jaundiced  Dialysis access: R IJ non-tunneled HD catheter being used for CRRT    LABS/STUDIES  --------------------------------------------------------------------------------              7.6    20.72 >-----------<  82       [05-04-24 @ 08:00]              23.5     130  |  96  |  9   ----------------------------<  104      [05-04-24 @ 08:00]  3.7   |  21  |  1.01        Ca     8.2     [05-04-24 @ 08:00]      iCa    1.14     [05-04 @ 08:00]      Mg     2.00     [05-04-24 @ 08:00]      Phos  4.1     [05-04-24 @ 08:00]    TPro  6.4  /  Alb  3.2  /  TBili  27.4  /  DBili  x   /  AST  377  /  ALT  122  /  AlkPhos  68  [05-04-24 @ 08:00]    Creatinine Trend:  SCr 1.01 [05-04 @ 08:00]  SCr 1.03 [05-04 @ 04:11]  SCr 1.11 [05-03 @ 22:03]  SCr 1.14 [05-03 @ 15:45]  SCr 1.12 [05-03 @ 08:25]    HBsAb 19.6      [04-27-24 @ 15:50]  HBsAg Nonreact      [04-27-24 @ 15:50]  HBcAb Nonreact      [04-27-24 @ 15:50]  HCV 0.24, Nonreact      [04-27-24 @ 05:00]  HIV Nonreact      [04-27-24 @ 18:56]    ERLIN: titer Negative, pattern --      [04-27-24 @ 15:50]  dsDNA 1      [04-27-24 @ 22:23]  C3 Complement 66      [04-27-24 @ 22:23]  C4 Complement 9      [05-03-24 @ 22:03]  ANCA: cANCA Negative, pANCA Negative, atypical ANCA Indeterminate Method interference due to ERLIN fluorescence      [04-27-24 @ 22:23]  anti-GBM <0.2      [04-27-24 @ 22:23]  Parvo PCRParvovirus B19 DNA by PCR: Not Detec IU/mL (04-27 @ 15:41)

## 2024-05-04 NOTE — PROGRESS NOTE ADULT - ATTENDING COMMENTS
Pt. with oliguric LUBA, currently on CRRT/CVVHDF. Pt. tolerating CRRT/CVVHDF during rounds. HD catheter functioning well. BP being maintained on IV vasopressor therapy. Monitor labs and urine output. Avoid any potential nephrotoxins. Dose medications as per eGFR. Overall prognosis guarded.

## 2024-05-04 NOTE — PROGRESS NOTE ADULT - PROBLEM SELECTOR PLAN 1
Patient with LUBA iso decompensated liver cirrhosis. Found to be anemic and thrombocytopenic. No obvious bleeding source on EGD. Patient developed ARDS due to liver failure and receiving multiple blood products, remains intubated. He was on Levo, Vaso, Octreotide, and Bumex gtts; now only on Levo. Was non-oliguric, but BUN/Cr continued to rise from 46/1.86 on admission to 70/2.55 on 4/29. Also was acidotic with pH 7.26. Urine studies significant for very low Na<20 c/w HRS as well. Despite adequate UOP initially, pt remained net positive and in ARDS. Therefore, CRRT initiated on 4/29 for fluid removal and removal of ammonia (94). Consent obtained from the father. Tolerating UF 100mL/h, no issues with catheter or filter clotting since increasing pre-filter rate. Pt now net negative 1.1L/24h and p/f ratio improving per MICU. POCUS on 5/2 still B-line predominant and bladder scan showed minimal urine. Will continue CRRT with goal net negative. Adjusting CRRT bags for potassium and phosphate imbalances. Will monitor labs closely. However, overall prognosis remains guarded.      Jolanta Mata, DO  Nephrology Fellow  Feel free to contact me directly on TEAMS with any questions.  (After 5pm or on weekends, please call the on-call fellow). Pt. with LUBA in setting of hypotension and decompensated liver cirrhosis. Found to be anemic and thrombocytopenic. No obvious bleeding source on EGD. Patient developed ARDS due to liver failure and receiving multiple blood products, remains intubated. He was on Levo, Vaso, Octreotide, and Bumex gtts, currently on Levo. Was non-oliguric, but BUN/Cr continued to rise from 46/1.86 on admission to 70/2.55 on 4/29. Also was acidotic with pH 7.26. Urine studies significant for very low Na<20 suggestive of HRS as well. Despite adequate UOP initially, pt remained net positive and was in ARDS. Hence, CRRT initiated on 4/29/24 for fluid removal. Consent for CRRT was obtained from the father. Tolerating UF 100mL/h, no issues with CRRT catheter or filter clotting overnight. Will continue CRRT as per discussion with MICU team. Monitor BP and labs. Avoid nephrotoxins. Overall prognosis remains guarded.    Jolanta Mata,   Nephrology Fellow  Feel free to contact me directly on TEAMS with any questions.  (After 5pm or on weekends, please call the on-call fellow).

## 2024-05-04 NOTE — PROGRESS NOTE ADULT - ASSESSMENT
43M with hx of alcohol use disorder and PAD, presented as a transfer from [place] ED for acute decompensated liver and respiratory failure and possible ECMO eval. Nephrology service consulted for LUBA and volume overload. Pt. with oliguric LUBA, on CRRT.

## 2024-05-04 NOTE — PROGRESS NOTE ADULT - SUBJECTIVE AND OBJECTIVE BOX
Dinorah Pham MD  ---------------------------------------------------------------------------------------------  Patient is a 43y old  Male who presents with a chief complaint of ECMO eval (03 May 2024 11:09)      HPI:  43M with hx of alcohol use disorder, restless leg syndrome, PVD, anxiety/PTSD ( service), PAD, taking adderall at home, presented as a transfer from [facility] ED for acute decompensated liver and respiratory failure and possible ECMO eval. Patient intubated on arrival, history obtained on chart review from paperwork received from [facility]. He initially presented on 4/25 for chest pain and was worried that he was having a heart attack, which subsided by the time he was evaluated at [facility]. He also fell on the day of presentation, but was unable to provide specific details on how he fell, stating that he just fell. Unclear hx of jaundice, but was noticed by someone from his [employer] who also suggested patient undergo med eval. ROS also notable for hemorrhoids and occasional BRBPR. Found to have Hb 4.9, received 4 units pRBC and 2 u FFP. He was initially placed on HFNC with worsening hypoxemia with plan for intubation. However had an episode of hematemesis and was immediately intubated for airway protection. Noted to have active bleeding during intubation. GI performed EGD with moderate amount of blood clots in oropharynx, no obvious bleeding source, normal esophagus, portal hypertensive gastropathy, hematin in gastric fundus. On laboratory evaluation, patient noted to have p/f 70 and high peak pressures with c/f ARDS. Patient was transferred to [facility] for further eval and management.     On d/w patient's father, patient was suffering from worsening leg pain from restless leg and worsening SYDNI. Was taking tylenol, unsure how much. (27 Apr 2024 00:46)      SUBJECTIVE / OVERNIGHT EVENTS:  ADDITIONAL REVIEW OF SYSTEMS:    MEDICATIONS  (STANDING):  albuterol/ipratropium for Nebulization 3 milliLiter(s) Nebulizer every 6 hours  cefepime   IVPB 2000 milliGRAM(s) IV Intermittent every 8 hours  chlorhexidine 0.12% Liquid 15 milliLiter(s) Oral Mucosa every 12 hours  chlorhexidine 2% Cloths 1 Application(s) Topical daily  CRRT Treatment    <Continuous>  dexMEDEtomidine Infusion 0.3 MICROgram(s)/kG/Hr (6.91 mL/Hr) IV Continuous <Continuous>  dextrose 10%. 1000 milliLiter(s) (100 mL/Hr) IV Continuous <Continuous>  folic acid 1 milliGRAM(s) Oral daily  heparin   Injectable 5000 Unit(s) SubCutaneous every 12 hours  ketamine Infusion. 2.5 mG/kG/Hr (23 mL/Hr) IV Continuous <Continuous>  metroNIDAZOLE  IVPB 500 milliGRAM(s) IV Intermittent every 12 hours  multivitamin/minerals/iron Oral Solution (CENTRUM) 15 milliLiter(s) Oral daily  norepinephrine Infusion 0.05 MICROgram(s)/kG/Min (4.32 mL/Hr) IV Continuous <Continuous>  pantoprazole  Injectable 40 milliGRAM(s) IV Push two times a day  petrolatum Ophthalmic Ointment 1 Application(s) Both EYES two times a day  Phoxillum Filtration BK 4 / 2.5 5000 milliLiter(s) (2000 mL/Hr) CRRT <Continuous>  polyethylene glycol 3350 17 Gram(s) Oral two times a day  PrismaSATE Dialysate BK 0 / 3.5 5000 milliLiter(s) (2500 mL/Hr) CRRT <Continuous>  PrismaSOL Filtration BGK 4 / 2.5 5000 milliLiter(s) (200 mL/Hr) CRRT <Continuous>  propofol Infusion 49.946 MICROgram(s)/kG/Min (27.6 mL/Hr) IV Continuous <Continuous>  senna 2 Tablet(s) Oral at bedtime  thiamine 100 milliGRAM(s) Oral daily  vancomycin  IVPB 750 milliGRAM(s) IV Intermittent every 12 hours  vasopressin Infusion 0.04 Unit(s)/Min (6 mL/Hr) IV Continuous <Continuous>    MEDICATIONS  (PRN):    Allergies    Allergy Status Unknown    Intolerances        ICU Vital Signs Last 24 Hrs  T(F): 99.5 (04 May 2024 04:00), Max: 102.2 (03 May 2024 16:00)  HR: 92 (04 May 2024 06:00) (92 - 110)  BP: --  BP(mean): --  ABP: 113/43 (04 May 2024 06:00) (75/28 - 177/72)  ABP(mean): 66 (04 May 2024 06:00) (44 - 106)  RR: 28 (04 May 2024 06:00) (28 - 30)  SpO2: 99% (04 May 2024 06:00) (92% - 100%)    Mode: AC/ CMV (Assist Control/ Continuous Mandatory Ventilation)  RR (machine): 28  TV (machine): 430  FiO2: 60  PEEP: 10  ITime: 0.71  MAP: 18  PIP: 36    PHYSICAL EXAM:  GENERAL: intubated, sedated  HENMT: Atraumatic, moist mucous membranes, no oropharyngeal exudates or vesicles, uvula is midline EYES: scleral icterus, PERRL,  HEART: RRR, S1/S2, no murmur/gallops/rubs  RESPIRATORY: Clear to auscultation bilaterally, no wheezes/rhonchi/rales  ABDOMEN: soft, nontender, nondistended  EXTREMITIES: No lower extremity edema, +2 radial pulses b/l  NEURO:  intubated, sedated   Heme/LYMPH:   SKIN:       I&O's Summary    02 May 2024 07:01  -  03 May 2024 07:00  --------------------------------------------------------  IN: 3688.6 mL / OUT: 6784 mL / NET: -3095.4 mL    03 May 2024 07:01  -  04 May 2024 06:36  --------------------------------------------------------  IN: 6034.9 mL / OUT: 6749 mL / NET: -714.1 mL        LABS:                        7.7    20.34 )-----------( 81       ( 04 May 2024 04:11 )             23.5     05-04    130<L>  |  97<L>  |  9   ----------------------------<  116<H>  3.7   |  19<L>  |  1.03    Ca    8.0<L>      04 May 2024 04:11  Phos  4.4     05-04  Mg     2.10     05-04    TPro  6.3  /  Alb  3.1<L>  /  TBili  26.8<H>  /  DBili  x   /  AST  353<H>  /  ALT  116<H>  /  AlkPhos  63  05-04    PT/INR - ( 04 May 2024 04:11 )   PT: 61.3 sec;   INR: 5.71 ratio         PTT - ( 04 May 2024 04:11 )  PTT:49.0 sec  ABG - ( 04 May 2024 04:11 )  pH, Arterial: 7.29  pH, Blood: x     /  pCO2: 44    /  pO2: 99    / HCO3: 21    / Base Excess: -5.1  /  SaO2: 99.1                  Urinalysis Basic - ( 04 May 2024 04:11 )    Color: x / Appearance: x / SG: x / pH: x  Gluc: 116 mg/dL / Ketone: x  / Bili: x / Urobili: x   Blood: x / Protein: x / Nitrite: x   Leuk Esterase: x / RBC: x / WBC x   Sq Epi: x / Non Sq Epi: x / Bacteria: x          CAPILLARY BLOOD GLUCOSE      POCT Blood Glucose.: 132 mg/dL (03 May 2024 18:21)  POCT Blood Glucose.: 72 mg/dL (03 May 2024 17:29)  POCT Blood Glucose.: 96 mg/dL (03 May 2024 11:52)  POCT Blood Glucose.: 160 mg/dL (03 May 2024 09:17)  POCT Blood Glucose.: 39 mg/dL (03 May 2024 08:48)      RADIOLOGY & ADDITIONAL TESTS:  Results Reviewed:   Imaging Personally Reviewed:  Electrocardiogram Personally Reviewed: Dinorah Pham MD  ---------------------------------------------------------------------------------------------  Patient is a 43y old  Male who presents with a chief complaint of ECMO eval (03 May 2024 11:09)      HPI:  43M with hx of alcohol use disorder, restless leg syndrome, PVD, anxiety/PTSD ( service), PAD, taking adderall at home, presented as a transfer from [facility] ED for acute decompensated liver and respiratory failure and possible ECMO eval. Patient intubated on arrival, history obtained on chart review from paperwork received from [facility]. He initially presented on 4/25 for chest pain and was worried that he was having a heart attack, which subsided by the time he was evaluated at [facility]. He also fell on the day of presentation, but was unable to provide specific details on how he fell, stating that he just fell. Unclear hx of jaundice, but was noticed by someone from his [employer] who also suggested patient undergo med eval. ROS also notable for hemorrhoids and occasional BRBPR. Found to have Hb 4.9, received 4 units pRBC and 2 u FFP. He was initially placed on HFNC with worsening hypoxemia with plan for intubation. However had an episode of hematemesis and was immediately intubated for airway protection. Noted to have active bleeding during intubation. GI performed EGD with moderate amount of blood clots in oropharynx, no obvious bleeding source, normal esophagus, portal hypertensive gastropathy, hematin in gastric fundus. On laboratory evaluation, patient noted to have p/f 70 and high peak pressures with c/f ARDS. Patient was transferred to [facility] for further eval and management.     On d/w patient's father, patient was suffering from worsening leg pain from restless leg and worsening SYDNI. Was taking tylenol, unsure how much. (27 Apr 2024 00:46)      SUBJECTIVE / OVERNIGHT EVENTS: NAEO patient seen and examined at bedside. ROS limited by mental status  ADDITIONAL REVIEW OF SYSTEMS:    MEDICATIONS  (STANDING):  albuterol/ipratropium for Nebulization 3 milliLiter(s) Nebulizer every 6 hours  cefepime   IVPB 2000 milliGRAM(s) IV Intermittent every 8 hours  chlorhexidine 0.12% Liquid 15 milliLiter(s) Oral Mucosa every 12 hours  chlorhexidine 2% Cloths 1 Application(s) Topical daily  CRRT Treatment    <Continuous>  dexMEDEtomidine Infusion 0.3 MICROgram(s)/kG/Hr (6.91 mL/Hr) IV Continuous <Continuous>  dextrose 10%. 1000 milliLiter(s) (100 mL/Hr) IV Continuous <Continuous>  folic acid 1 milliGRAM(s) Oral daily  heparin   Injectable 5000 Unit(s) SubCutaneous every 12 hours  ketamine Infusion. 2.5 mG/kG/Hr (23 mL/Hr) IV Continuous <Continuous>  metroNIDAZOLE  IVPB 500 milliGRAM(s) IV Intermittent every 12 hours  multivitamin/minerals/iron Oral Solution (CENTRUM) 15 milliLiter(s) Oral daily  norepinephrine Infusion 0.05 MICROgram(s)/kG/Min (4.32 mL/Hr) IV Continuous <Continuous>  pantoprazole  Injectable 40 milliGRAM(s) IV Push two times a day  petrolatum Ophthalmic Ointment 1 Application(s) Both EYES two times a day  Phoxillum Filtration BK 4 / 2.5 5000 milliLiter(s) (2000 mL/Hr) CRRT <Continuous>  polyethylene glycol 3350 17 Gram(s) Oral two times a day  PrismaSATE Dialysate BK 0 / 3.5 5000 milliLiter(s) (2500 mL/Hr) CRRT <Continuous>  PrismaSOL Filtration BGK 4 / 2.5 5000 milliLiter(s) (200 mL/Hr) CRRT <Continuous>  propofol Infusion 49.946 MICROgram(s)/kG/Min (27.6 mL/Hr) IV Continuous <Continuous>  senna 2 Tablet(s) Oral at bedtime  thiamine 100 milliGRAM(s) Oral daily  vancomycin  IVPB 750 milliGRAM(s) IV Intermittent every 12 hours  vasopressin Infusion 0.04 Unit(s)/Min (6 mL/Hr) IV Continuous <Continuous>    MEDICATIONS  (PRN):    Allergies    Allergy Status Unknown    Intolerances        ICU Vital Signs Last 24 Hrs  T(F): 99.5 (04 May 2024 04:00), Max: 102.2 (03 May 2024 16:00)  HR: 92 (04 May 2024 06:00) (92 - 110)  BP: --  BP(mean): --  ABP: 113/43 (04 May 2024 06:00) (75/28 - 177/72)  ABP(mean): 66 (04 May 2024 06:00) (44 - 106)  RR: 28 (04 May 2024 06:00) (28 - 30)  SpO2: 99% (04 May 2024 06:00) (92% - 100%)    Mode: AC/ CMV (Assist Control/ Continuous Mandatory Ventilation)  RR (machine): 28  TV (machine): 430  FiO2: 60  PEEP: 10  ITime: 0.71  MAP: 18  PIP: 36    PHYSICAL EXAM:  GENERAL: intubated, sedated  HENMT: Atraumatic, moist mucous membranes, no oropharyngeal exudates or vesicles, uvula is midline EYES: scleral icterus, PERRL,  HEART: RRR, S1/S2, no murmur/gallops/rubs  RESPIRATORY: Clear to auscultation bilaterally, no wheezes/rhonchi/rales  ABDOMEN: soft, nontender, protuberant  EXTREMITIES: 1+ lower extremity edema, +2 radial pulses b/l  NEURO:  intubated, sedated   Heme/LYMPH: ecchymosis on RLE/calf area  SKIN: jaundiced      I&O's Summary    02 May 2024 07:01  -  03 May 2024 07:00  --------------------------------------------------------  IN: 3688.6 mL / OUT: 6784 mL / NET: -3095.4 mL    03 May 2024 07:01  -  04 May 2024 06:36  --------------------------------------------------------  IN: 6034.9 mL / OUT: 6749 mL / NET: -714.1 mL        LABS:                        7.7    20.34 )-----------( 81       ( 04 May 2024 04:11 )             23.5     05-04    130<L>  |  97<L>  |  9   ----------------------------<  116<H>  3.7   |  19<L>  |  1.03    Ca    8.0<L>      04 May 2024 04:11  Phos  4.4     05-04  Mg     2.10     05-04    TPro  6.3  /  Alb  3.1<L>  /  TBili  26.8<H>  /  DBili  x   /  AST  353<H>  /  ALT  116<H>  /  AlkPhos  63  05-04    PT/INR - ( 04 May 2024 04:11 )   PT: 61.3 sec;   INR: 5.71 ratio         PTT - ( 04 May 2024 04:11 )  PTT:49.0 sec  ABG - ( 04 May 2024 04:11 )  pH, Arterial: 7.29  pH, Blood: x     /  pCO2: 44    /  pO2: 99    / HCO3: 21    / Base Excess: -5.1  /  SaO2: 99.1                  Urinalysis Basic - ( 04 May 2024 04:11 )    Color: x / Appearance: x / SG: x / pH: x  Gluc: 116 mg/dL / Ketone: x  / Bili: x / Urobili: x   Blood: x / Protein: x / Nitrite: x   Leuk Esterase: x / RBC: x / WBC x   Sq Epi: x / Non Sq Epi: x / Bacteria: x          CAPILLARY BLOOD GLUCOSE      POCT Blood Glucose.: 132 mg/dL (03 May 2024 18:21)  POCT Blood Glucose.: 72 mg/dL (03 May 2024 17:29)  POCT Blood Glucose.: 96 mg/dL (03 May 2024 11:52)  POCT Blood Glucose.: 160 mg/dL (03 May 2024 09:17)  POCT Blood Glucose.: 39 mg/dL (03 May 2024 08:48)      RADIOLOGY & ADDITIONAL TESTS:  Results Reviewed:   Imaging Personally Reviewed:  Electrocardiogram Personally Reviewed:

## 2024-05-05 NOTE — PROGRESS NOTE ADULT - ASSESSMENT
43M with hx of alcohol use disorder, restless leg syndrome, PVD, anxiety/PTSD ( service), PAD, taking adderall at home, presented as a transfer from [facility] ED for acute decompensated liver and respiratory failure and possible ECMO eval.    NEURO  # encephalopathy  - Pt encephalopathic on admission likely metabolic vs hepatic encephalopathy (ammonia 94). Intubated for airway protection  - s/p paralytic 5/1  - currently sedated (on , prop, ketamine), fent weaned, now wean ketamine  - maintain RAAS -2 to -3, wean per ICU protocol;  - started lactulose 5/4    #Seizures   - no documented history of seizures, appeared to be having seizure like activity during rounds 4/27  - s/o veeg negative  - monitor for now    CARDS  # shock  - likely vasoplegia iso sedation, currently on levo and vaso, stable.  - wean vasopressors per ICU protocol  - maintain MAP 65-70  - abx as below  - TTE 4/27 EF 64%, LAE, mild AR, mild MR    #Aortic calcification/?Bicuspid aortic valve  - Pt with aortic calcification on echo w/ possible bicuspid valve. +systolic murmur  - Echo w/o vegetation    PULM  # ARDS  - P/F reported to be 70 at [facility], reflecting severe ARDS. Now improved to 107  -- Peak pressure 36 w/ Plateau 32; PEEP decreased to 10 and subsequently peak pressure 32 and plateau 29; modify vent setting per blood gas  - POCUS w/ RLL consolidation. ARDS likely i/s/o PNA vs pneumonitis from hematemesis  - maintain lung protective ventilation, wean per ICU protocol  - s/p proning with improvement in oxygenation. Pt now w/ improving O2 requirements and plateau pressure  - Airleak overnight 5/3, ETT changed 5/3  - duonebs q6    RENAL  # renal failure, anuria  - sCr on admit 1.86, baseline unclear. Now worsening 2.55. Pt with decreasing UO s/p bumex gtt and metalazone  - likely hepatorenal syndrome, vs ATN from shock  --- s/p albumin x2 days, on octreotide and levophed  - Renal protective measures: Please renally dose all medications; Avoid nephrotoxic medications (NSAIDS, IV contrast); Avoid ACEi and ARBs in the setting of LUBA; Maintain MAP >65;   - Nephro c/s for CVVH;   - monitor strict I&O  - hold off on straight cath for now given elevated INR    GI  # hematemesis  - EGD at OSH noted blood clots in oropharynx, no obvious bleeding source, normal esophagus  - no epistaxis noted on exam  - 4/28 increased bloody output from OGT, no bloody output currently  - Hepatology following, no plan for EGD today  - protonix 40 mg IVP BID  - NPO    # acute liver failure  - likely i/s/o alcohol use; AST / ALT 2:1 ratio. Bilirubin 28   - Last EtOH use: reportedly 4/20/24 per documentation from [facility]  - Smooth muscle ab 1:20   - MELD-Na on admission: 33, trend    - Variceal status: normal esophagus on EGD at OSH  - Per hepatology discontinue NAC  -- If pt improves, may be a candidate for transplant given high MELD per hepatology  - GI following, will appreciate recs  - max tylenol 2000 mg daily    #Bowel regimen  - Pt without BM, s/p relistor i/s/o fent use; now off of fent  - c/w miralax, senna, lactulose    ID  # Sepsis/septic shock  - POCUS w/ RLL consolidation, possible cause of infection  - UCx and BCx NGTD,   - s/p vanco and azithro, d/c iso neg legionella and mrsa  - previously on zosyn (4/27 - 5/3); see abx below  - Fungitell 200, can be falsely elevated. Will dc fluconazole given elevated LFTs  Pt n/ new fevers o/n 5/2, broaden to flagyl, cefepime, and vancomycin  - f/u infx work-up    ENDO  # hypocortisoliemia  - 8 AM cortisol 8.7  - solucortef 5/4    #Low TSH  - TSH 0.10, T4 WNL    #hypoglycemia  likely i/s/o liver failure and not on tube feeds  - start trickle feeds at 10cc  - D10 at 75cc, increase as tolerated    #hypoglycemia  - likely i/s/o liver failure and feed being held for gut motility issue  -- increase tube feeds, if not will start on standing d50    HEME/ONC  # bicytopenia (anemia and thrombocytopenia)  # elevated INR  - likely iso liver failure vs acute bleed at OSH  - s/p 4 u prbc and 2 u FFP at OSH. received 1 u prbc, 1 u plt, and 1 u FFP on 4/27  - shiley placement and exchange of lines  -- s/p additional 3u FFP now, 1u plt during shiley palcement, and 1u pRBC 4/29  - transfuse for hb <7, plt <50 for bleeding  - monitor fibrinogen QD  - Vitamin K x2 (4/28-4/29), another dose given overnight 5/5    #Portal vein thrombosis  - Evidence of portal vein thrombosis on US abdomen  - hold AC at this time iso upper GIB, started ppx    DERM  # jaundice  - likely iso elevated bilirubin, acute liver failure    #B/l LE edema  - VA duplex negative    DVT: start subq heparin  FENGI: tube feeds  LDA: L IJ and radial a-line and R shiley  GTT: prop,, levo, vaso, ketamine  GOC: Full code for now, family: father Justice and has a brother. Case discussed with father and updates given.   43M with hx of alcohol use disorder, restless leg syndrome, PVD, anxiety/PTSD ( service), PAD, taking adderall at home, presented as a transfer from [facility] ED for acute decompensated liver and respiratory failure and possible ECMO eval.    NEURO  # encephalopathy  - Pt encephalopathic on admission likely metabolic vs hepatic encephalopathy (ammonia 94). Intubated for airway protection  - s/p paralytic 5/1  - currently sedated (on , prop, ketamine), fent weaned, now wean ketamine  - maintain RAAS -2 to -3, wean per ICU protocol;  - started lactulose 5/4    #Seizures   - no documented history of seizures, appeared to be having seizure like activity during rounds 4/27  - s/o veeg negative  - monitor for now    CARDS  # shock  - likely vasoplegia iso sedation, currently on levo and vaso, stable.  - wean vasopressors per ICU protocol  - maintain MAP 65-70  - abx as below  - TTE 4/27 EF 64%, LAE, mild AR, mild MR    #Aortic calcification/?Bicuspid aortic valve  - Pt with aortic calcification on echo w/ possible bicuspid valve. +systolic murmur  - Echo w/o vegetation    PULM  # ARDS  - P/F reported to be 70 at [facility], reflecting severe ARDS. Now improved to 107  -- Peak pressure 36 w/ Plateau 32; PEEP decreased to 10 and subsequently peak pressure 32 and plateau 29; modify vent setting per blood gas  - POCUS w/ RLL consolidation. ARDS likely i/s/o PNA vs pneumonitis from hematemesis  - maintain lung protective ventilation, wean per ICU protocol  - s/p proning with improvement in oxygenation. Pt now w/ improving O2 requirements and plateau pressure  - Airleak overnight 5/3, ETT changed 5/3  - duonebs q6    RENAL  # renal failure, anuria  - sCr on admit 1.86, baseline unclear. Now worsening 2.55. Pt with decreasing UO s/p bumex gtt and metalazone  - likely hepatorenal syndrome, vs ATN from shock  --- s/p albumin x2 days, on octreotide and levophed  - Renal protective measures: Please renally dose all medications; Avoid nephrotoxic medications (NSAIDS, IV contrast); Avoid ACEi and ARBs in the setting of LUBA; Maintain MAP >65;   - Nephro c/s for CVVH;   - monitor strict I&O  - Bladder scan w/ ~400cc, will give FFP and put in a geronimo to prevent persistent trauma and bleeding from straight caths given elevated INR    #hyponatremia  Likely i/s/o hypotonic fluids and d10 gtt; will decrease d10 to 50cc/hr.   - Monitor BMPs    GI  # hematemesis  - EGD at OSH noted blood clots in oropharynx, no obvious bleeding source, normal esophagus  - no epistaxis noted on exam  - 4/28 increased bloody output from OGT, no bloody output currently  - Hepatology following, no plan for EGD today  - protonix 40 mg IVP BID  - NPO    # acute liver failure  - likely i/s/o alcohol use; AST / ALT 2:1 ratio. Bilirubin 28   - Last EtOH use: reportedly 4/20/24 per documentation from [facility]  - Smooth muscle ab 1:20   - MELD-Na on admission: 33, trend    - Variceal status: normal esophagus on EGD at OSH  - Per hepatology discontinue NAC  -- If pt improves, may be a candidate for transplant given high MELD per hepatology  - GI following, will appreciate recs  - max tylenol 2000 mg daily    #Bowel regimen  - Pt without BM, s/p relistor i/s/o fent use; now off of fent  - c/w miralax, senna, lactulose    ID  # Sepsis/septic shock  - POCUS w/ RLL consolidation, possible cause of infection  - UCx and BCx NGTD,   - s/p vanco and azithro, d/c iso neg legionella and mrsa  - previously on zosyn (4/27 - 5/3); see abx below  - Fungitell 200, can be falsely elevated. Will dc fluconazole given elevated LFTs  Pt n/ new fevers o/n 5/2, broaden to flagyl, cefepime, and vancomycin  - f/u infx work-up    ENDO  # hypocortisoliemia  - 8 AM cortisol 8.7  - solucortef 100mg IVP q8 started 5/4    #Low TSH  - TSH 0.10, T4 WNL    #hypoglycemia  likely i/s/o liver failure and not on tube feeds  - start trickle feeds at 10cc  - D10 at 75cc, increase as tolerated    #hypoglycemia  - likely i/s/o liver failure, AI, and full feeds being held for gut motility issue  -- tube feeds + d10    HEME/ONC  # bicytopenia (anemia and thrombocytopenia)  # elevated INR  - likely iso liver failure vs acute bleed at OSH  - s/p 4 u prbc and 2 u FFP at OSH. received 1 u prbc, 1 u plt, and 1 u FFP on 4/27  - shiley placement and exchange of lines  -- s/p additional 3u FFP now, 1u plt during shiley palcement, and 1u pRBC 4/29  - transfuse for hb <7, plt <50 for bleeding  - monitor fibrinogen QD  - Vitamin K x2 (4/28-4/29), another dose given overnight 5/5    #Portal vein thrombosis  - Evidence of portal vein thrombosis on US abdomen  - hold AC at this time iso upper GIB    DERM  # jaundice  - likely iso elevated bilirubin, acute liver failure    #B/l LE edema  - VA duplex negative    DVT: dc heparin given elevated INR and decreasing plt  FENGI: tube feeds  LDA: L IJ and radial a-line and R shiley  GTT: prop,, levo, vaso, ketamine  GOC: Full code for now, family: father Justice and has a brother. Case discussed with father and updates given.

## 2024-05-05 NOTE — PROGRESS NOTE ADULT - SUBJECTIVE AND OBJECTIVE BOX
Carlos Ponce  PGY-3 | Internal Medicine      INTERVAL HPI/OVERNIGHT EVENTS: INR elevated s/p vit K. Also OGT placed w/ 650cc output    SUBJECTIVE: Patient seen and examined at bedside.     Patient is intubated and sedated    OBJECTIVE:    VITAL SIGNS:  ICU Vital Signs Last 24 Hrs  T(C): 37.4 (05 May 2024 04:00), Max: 38.4 (04 May 2024 16:00)  T(F): 99.3 (05 May 2024 04:00), Max: 101.1 (04 May 2024 16:00)  HR: 79 (05 May 2024 06:30) (75 - 93)  BP: --  BP(mean): --  ABP: 109/46 (05 May 2024 06:00) (109/46 - 130/57)  ABP(mean): 67 (05 May 2024 06:00) (66 - 83)  RR: 28 (05 May 2024 06:00) (28 - 28)  SpO2: 99% (05 May 2024 06:30) (95% - 100%)    O2 Parameters below as of 05 May 2024 06:00  Patient On (Oxygen Delivery Method): ventilator    O2 Concentration (%): 60      Mode: AC/ CMV (Assist Control/ Continuous Mandatory Ventilation), RR (machine): 28, TV (machine): 430, FiO2: 60, PEEP: 10, ITime: 0.65, MAP: 17, PIP: 32    05-04 @ 07:01  -  05-05 @ 07:00  --------------------------------------------------------  IN: 5727.8 mL / OUT: 8584 mL / NET: -2856.2 mL      CAPILLARY BLOOD GLUCOSE      POCT Blood Glucose.: 132 mg/dL (03 May 2024 18:21)      PHYSICAL EXAM:    GENERAL: intubated, sedated  HENMT: Atraumatic, moist mucous membranes, no oropharyngeal exudates or vesicles, uvula is midline EYES: scleral icterus, PERRL,  HEART: RRR, S1/S2, no murmur/gallops/rubs  RESPIRATORY: Clear to auscultation bilaterally, no wheezes/rhonchi/rales  ABDOMEN: soft, nontender, protuberant  EXTREMITIES: 1+ lower extremity edema, +2 radial pulses b/l  NEURO:  intubated, sedated   Heme/LYMPH: ecchymosis on RLE/calf area  SKIN: jaundiced    MEDICATIONS:  MEDICATIONS  (STANDING):  albuterol/ipratropium for Nebulization 3 milliLiter(s) Nebulizer every 6 hours  calcium gluconate IVPB 2 Gram(s) IV Intermittent once  cefepime   IVPB 2000 milliGRAM(s) IV Intermittent every 8 hours  chlorhexidine 0.12% Liquid 15 milliLiter(s) Oral Mucosa every 12 hours  chlorhexidine 2% Cloths 1 Application(s) Topical daily  CRRT Treatment    <Continuous>  dexMEDEtomidine Infusion 0.3 MICROgram(s)/kG/Hr (6.91 mL/Hr) IV Continuous <Continuous>  dextrose 10%. 1000 milliLiter(s) (100 mL/Hr) IV Continuous <Continuous>  folic acid 1 milliGRAM(s) Oral daily  heparin   Injectable 5000 Unit(s) SubCutaneous every 12 hours  hydrocortisone sodium succinate Injectable 100 milliGRAM(s) IV Push every 8 hours  ketamine Infusion. 1.5 mG/kG/Hr (13.8 mL/Hr) IV Continuous <Continuous>  lactulose Syrup 30 Gram(s) Oral three times a day  metroNIDAZOLE  IVPB 500 milliGRAM(s) IV Intermittent every 12 hours  multivitamin/minerals/iron Oral Solution (CENTRUM) 15 milliLiter(s) Oral daily  norepinephrine Infusion 0.05 MICROgram(s)/kG/Min (4.32 mL/Hr) IV Continuous <Continuous>  pantoprazole  Injectable 40 milliGRAM(s) IV Push two times a day  petrolatum Ophthalmic Ointment 1 Application(s) Both EYES two times a day  Phoxillum Filtration BK 4 / 2.5 5000 milliLiter(s) (2000 mL/Hr) CRRT <Continuous>  phytonadione  IVPB 10 milliGRAM(s) IV Intermittent once  polyethylene glycol 3350 17 Gram(s) Oral two times a day  PrismaSATE Dialysate BK 0 / 3.5 5000 milliLiter(s) (2500 mL/Hr) CRRT <Continuous>  PrismaSOL Filtration BGK 4 / 2.5 5000 milliLiter(s) (200 mL/Hr) CRRT <Continuous>  propofol Infusion 49.946 MICROgram(s)/kG/Min (27.6 mL/Hr) IV Continuous <Continuous>  senna 2 Tablet(s) Oral at bedtime  thiamine 100 milliGRAM(s) Oral daily  vancomycin  IVPB 750 milliGRAM(s) IV Intermittent every 12 hours  vasopressin Infusion 0.04 Unit(s)/Min (6 mL/Hr) IV Continuous <Continuous>    MEDICATIONS  (PRN):      ALLERGIES:  Allergies    Allergy Status Unknown    Intolerances        LABS:                        7.4    16.81 )-----------( 68       ( 05 May 2024 03:49 )             22.4     05-05    128<L>  |  96<L>  |  9   ----------------------------<  164<H>  4.3   |  20<L>  |  0.94    Ca    8.3<L>      05 May 2024 03:49  Phos  4.1     05-05  Mg     2.20     05-05    TPro  6.2  /  Alb  3.1<L>  /  TBili  28.1<H>  /  DBili  x   /  AST  388<H>  /  ALT  134<H>  /  AlkPhos  75  05-05    PT/INR - ( 05 May 2024 03:49 )   PT: 103.0 sec;   INR: 9.83 ratio         PTT - ( 05 May 2024 03:49 )  PTT:60.5 sec  Urinalysis Basic - ( 05 May 2024 03:49 )    Color: x / Appearance: x / SG: x / pH: x  Gluc: 164 mg/dL / Ketone: x  / Bili: x / Urobili: x   Blood: x / Protein: x / Nitrite: x   Leuk Esterase: x / RBC: x / WBC x   Sq Epi: x / Non Sq Epi: x / Bacteria: x        RADIOLOGY & ADDITIONAL TESTS: Reviewed. Carlos Ponce  PGY-3 | Internal Medicine    INTERVAL HPI/OVERNIGHT EVENTS: INR elevated s/p vit K. Also OGT placed w/ 650cc output    SUBJECTIVE: Patient seen and examined at bedside.     Patient is intubated and sedated    OBJECTIVE:    VITAL SIGNS:  ICU Vital Signs Last 24 Hrs  T(C): 37.4 (05 May 2024 04:00), Max: 38.4 (04 May 2024 16:00)  T(F): 99.3 (05 May 2024 04:00), Max: 101.1 (04 May 2024 16:00)  HR: 79 (05 May 2024 06:30) (75 - 93)  BP: --  BP(mean): --  ABP: 109/46 (05 May 2024 06:00) (109/46 - 130/57)  ABP(mean): 67 (05 May 2024 06:00) (66 - 83)  RR: 28 (05 May 2024 06:00) (28 - 28)  SpO2: 99% (05 May 2024 06:30) (95% - 100%)    O2 Parameters below as of 05 May 2024 06:00  Patient On (Oxygen Delivery Method): ventilator    O2 Concentration (%): 60      Mode: AC/ CMV (Assist Control/ Continuous Mandatory Ventilation), RR (machine): 28, TV (machine): 430, FiO2: 60, PEEP: 10, ITime: 0.65, MAP: 17, PIP: 32    05-04 @ 07:01  -  05-05 @ 07:00  --------------------------------------------------------  IN: 5727.8 mL / OUT: 8584 mL / NET: -2856.2 mL      CAPILLARY BLOOD GLUCOSE      POCT Blood Glucose.: 132 mg/dL (03 May 2024 18:21)      PHYSICAL EXAM:    GENERAL: intubated, sedated  HENMT: Atraumatic, moist mucous membranes, no oropharyngeal exudates or vesicles, uvula is midline EYES: scleral icterus, PERRL,  HEART: RRR, S1/S2, no murmur/gallops/rubs  RESPIRATORY: Clear to auscultation bilaterally, no wheezes/rhonchi/rales  ABDOMEN: soft, nontender, protuberant  EXTREMITIES: 1+ lower extremity edema, +2 radial pulses b/l  NEURO:  intubated, sedated   Heme/LYMPH: ecchymosis on RLE/calf area  SKIN: jaundiced    MEDICATIONS:  MEDICATIONS  (STANDING):  albuterol/ipratropium for Nebulization 3 milliLiter(s) Nebulizer every 6 hours  calcium gluconate IVPB 2 Gram(s) IV Intermittent once  cefepime   IVPB 2000 milliGRAM(s) IV Intermittent every 8 hours  chlorhexidine 0.12% Liquid 15 milliLiter(s) Oral Mucosa every 12 hours  chlorhexidine 2% Cloths 1 Application(s) Topical daily  CRRT Treatment    <Continuous>  dexMEDEtomidine Infusion 0.3 MICROgram(s)/kG/Hr (6.91 mL/Hr) IV Continuous <Continuous>  dextrose 10%. 1000 milliLiter(s) (100 mL/Hr) IV Continuous <Continuous>  folic acid 1 milliGRAM(s) Oral daily  heparin   Injectable 5000 Unit(s) SubCutaneous every 12 hours  hydrocortisone sodium succinate Injectable 100 milliGRAM(s) IV Push every 8 hours  ketamine Infusion. 1.5 mG/kG/Hr (13.8 mL/Hr) IV Continuous <Continuous>  lactulose Syrup 30 Gram(s) Oral three times a day  metroNIDAZOLE  IVPB 500 milliGRAM(s) IV Intermittent every 12 hours  multivitamin/minerals/iron Oral Solution (CENTRUM) 15 milliLiter(s) Oral daily  norepinephrine Infusion 0.05 MICROgram(s)/kG/Min (4.32 mL/Hr) IV Continuous <Continuous>  pantoprazole  Injectable 40 milliGRAM(s) IV Push two times a day  petrolatum Ophthalmic Ointment 1 Application(s) Both EYES two times a day  Phoxillum Filtration BK 4 / 2.5 5000 milliLiter(s) (2000 mL/Hr) CRRT <Continuous>  phytonadione  IVPB 10 milliGRAM(s) IV Intermittent once  polyethylene glycol 3350 17 Gram(s) Oral two times a day  PrismaSATE Dialysate BK 0 / 3.5 5000 milliLiter(s) (2500 mL/Hr) CRRT <Continuous>  PrismaSOL Filtration BGK 4 / 2.5 5000 milliLiter(s) (200 mL/Hr) CRRT <Continuous>  propofol Infusion 49.946 MICROgram(s)/kG/Min (27.6 mL/Hr) IV Continuous <Continuous>  senna 2 Tablet(s) Oral at bedtime  thiamine 100 milliGRAM(s) Oral daily  vancomycin  IVPB 750 milliGRAM(s) IV Intermittent every 12 hours  vasopressin Infusion 0.04 Unit(s)/Min (6 mL/Hr) IV Continuous <Continuous>    MEDICATIONS  (PRN):      ALLERGIES:  Allergies    Allergy Status Unknown    Intolerances        LABS:                        7.4    16.81 )-----------( 68       ( 05 May 2024 03:49 )             22.4     05-05    128<L>  |  96<L>  |  9   ----------------------------<  164<H>  4.3   |  20<L>  |  0.94    Ca    8.3<L>      05 May 2024 03:49  Phos  4.1     05-05  Mg     2.20     05-05    TPro  6.2  /  Alb  3.1<L>  /  TBili  28.1<H>  /  DBili  x   /  AST  388<H>  /  ALT  134<H>  /  AlkPhos  75  05-05    PT/INR - ( 05 May 2024 03:49 )   PT: 103.0 sec;   INR: 9.83 ratio         PTT - ( 05 May 2024 03:49 )  PTT:60.5 sec  Urinalysis Basic - ( 05 May 2024 03:49 )    Color: x / Appearance: x / SG: x / pH: x  Gluc: 164 mg/dL / Ketone: x  / Bili: x / Urobili: x   Blood: x / Protein: x / Nitrite: x   Leuk Esterase: x / RBC: x / WBC x   Sq Epi: x / Non Sq Epi: x / Bacteria: x        RADIOLOGY & ADDITIONAL TESTS: Reviewed.

## 2024-05-05 NOTE — PROGRESS NOTE ADULT - SUBJECTIVE AND OBJECTIVE BOX
[facility] Division of Kidney Diseases & Hypertension  FOLLOW UP NOTE  [phone]--------------------------------------------------------------------------------  Chief Complaint: History of extracorporeal membrane oxygenation treatment        24 hour events/subjective:        PAST HISTORY  --------------------------------------------------------------------------------  No significant changes to PMH, PSH, FHx, SHx, unless otherwise noted    ALLERGIES & MEDICATIONS  --------------------------------------------------------------------------------  Allergies    Allergy Status Unknown    Intolerances      Standing Inpatient Medications  albuterol/ipratropium for Nebulization 3 milliLiter(s) Nebulizer every 6 hours  cefepime   IVPB 2000 milliGRAM(s) IV Intermittent every 8 hours  chlorhexidine 0.12% Liquid 15 milliLiter(s) Oral Mucosa every 12 hours  chlorhexidine 2% Cloths 1 Application(s) Topical daily  CRRT Treatment    <Continuous>  dexMEDEtomidine Infusion 0.3 MICROgram(s)/kG/Hr IV Continuous <Continuous>  dextrose 10%. 1000 milliLiter(s) IV Continuous <Continuous>  folic acid 1 milliGRAM(s) Oral daily  heparin   Injectable 5000 Unit(s) SubCutaneous every 12 hours  hydrocortisone sodium succinate Injectable 100 milliGRAM(s) IV Push every 8 hours  ketamine Infusion. 1.5 mG/kG/Hr IV Continuous <Continuous>  lactulose Syrup 30 Gram(s) Oral three times a day  metroNIDAZOLE  IVPB 500 milliGRAM(s) IV Intermittent every 12 hours  multivitamin/minerals/iron Oral Solution (CENTRUM) 15 milliLiter(s) Oral daily  norepinephrine Infusion 0.05 MICROgram(s)/kG/Min IV Continuous <Continuous>  pantoprazole  Injectable 40 milliGRAM(s) IV Push two times a day  petrolatum Ophthalmic Ointment 1 Application(s) Both EYES two times a day  polyethylene glycol 3350 17 Gram(s) Oral two times a day  PrismaSATE Dialysate BK 0 / 3.5 5000 milliLiter(s) CRRT <Continuous>  PrismaSOL Filtration BGK 4 / 2.5 5000 milliLiter(s) CRRT <Continuous>  PrismaSOL Filtration BGK 4 / 2.5 5000 milliLiter(s) CRRT <Continuous>  propofol Infusion 49.946 MICROgram(s)/kG/Min IV Continuous <Continuous>  senna 2 Tablet(s) Oral at bedtime  thiamine 100 milliGRAM(s) Oral daily  vancomycin  IVPB 750 milliGRAM(s) IV Intermittent every 12 hours  vasopressin Infusion 0.04 Unit(s)/Min IV Continuous <Continuous>    PRN Inpatient Medications      REVIEW OF SYSTEMS  --------------------------------------------------------------------------------  Constitutional: No fevers, no chills  HEENT: No HA, sore throat   Respiratory: No dyspnea, cough  Cardiovascular: No chest pain  Gastrointestinal: No abdominal pain, diarrhea, nausea, vomiting  Genitourinary: No dysuria, hematuria, urgency  Extremities: No edema  Skin: No rashes    All other systems were reviewed and are negative, except as noted.    VITALS/PHYSICAL EXAM  --------------------------------------------------------------------------------  T(C): 37.8 (05-05-24 @ 08:00), Max: 38.4 (05-04-24 @ 16:00)  HR: 81 (05-05-24 @ 10:30) (75 - 93)  BP: --  RR: 28 (05-05-24 @ 10:00) (28 - 28)  SpO2: 99% (05-05-24 @ 10:30) (95% - 100%)  Wt(kg): --        05-04-24 @ 07:01  -  05-05-24 @ 07:00  --------------------------------------------------------  IN: 5908.3 mL / OUT: 9197 mL / NET: -3288.7 mL    05-05-24 @ 07:01  -  05-05-24 @ 10:36  --------------------------------------------------------  IN: 822 mL / OUT: 680 mL / NET: 142 mL      Physical Exam:  	Gen: sedated and intubated  	HEENT: NC/AT, +ETT, +OGT  	Pulm: Coarse breath sounds bilaterally  	CV: +S1S2  	Abd: +BS, soft  	: geronimo              Extremities: +bilateral LE edema noted               Neuro: sedated  	Skin: Warm and dry, +jaundice  	Dialysis access:    LABS/STUDIES  --------------------------------------------------------------------------------              7.4    16.81 >-----------<  68       [05-05-24 @ 03:49]              22.4     128  |  96  |  9   ----------------------------<  164      [05-05-24 @ 03:49]  4.3   |  20  |  0.94        Ca     8.3     [05-05-24 @ 03:49]      iCa    1.24     [05-05 @ 10:15]      Mg     2.20     [05-05-24 @ 03:49]      Phos  4.1     [05-05-24 @ 03:49]    TPro  6.2  /  Alb  3.1  /  TBili  28.1  /  DBili  x   /  AST  388  /  ALT  134  /  AlkPhos  75  [05-05-24 @ 03:49]    PT/INR: .0, INR 9.83       [05-05-24 @ 03:49]  PTT: 60.5       [05-05-24 @ 03:49]      Creatinine Trend:  SCr 0.94 [05-05 @ 03:49]  SCr 0.98 [05-04 @ 20:58]  SCr 1.00 [05-04 @ 14:45]  SCr 1.01 [05-04 @ 08:00]  SCr 1.03 [05-04 @ 04:11]    Urinalysis - [05-05-24 @ 03:49]      Color  / Appearance  / SG  / pH       Gluc 164 / Ketone   / Bili  / Urobili        Blood  / Protein  / Leuk Est  / Nitrite       RBC  / WBC  / Hyaline  / Gran  / Sq Epi  / Non Sq Epi  / Bacteria       Lipid: chol --, , HDL --, LDL --      [05-02-24 @ 22:00]      C4 Complement 9      [05-03-24 @ 22:03] [facility] Division of Kidney Diseases & Hypertension  FOLLOW UP NOTE  [phone]--------------------------------------------------------------------------------  Chief Complaint: LUBA on CRRT        24 hour events/subjective: Remains intubated and on pressors. Oliguric and on CRRT        PAST HISTORY  --------------------------------------------------------------------------------  No significant changes to PMH, PSH, FHx, SHx, unless otherwise noted    ALLERGIES & MEDICATIONS  --------------------------------------------------------------------------------  Allergies    Allergy Status Unknown      Standing Inpatient Medications  albuterol/ipratropium for Nebulization 3 milliLiter(s) Nebulizer every 6 hours  cefepime   IVPB 2000 milliGRAM(s) IV Intermittent every 8 hours  chlorhexidine 0.12% Liquid 15 milliLiter(s) Oral Mucosa every 12 hours  chlorhexidine 2% Cloths 1 Application(s) Topical daily  CRRT Treatment    <Continuous>  dexMEDEtomidine Infusion 0.3 MICROgram(s)/kG/Hr IV Continuous <Continuous>  dextrose 10%. 1000 milliLiter(s) IV Continuous <Continuous>  folic acid 1 milliGRAM(s) Oral daily  heparin   Injectable 5000 Unit(s) SubCutaneous every 12 hours  hydrocortisone sodium succinate Injectable 100 milliGRAM(s) IV Push every 8 hours  ketamine Infusion. 1.5 mG/kG/Hr IV Continuous <Continuous>  lactulose Syrup 30 Gram(s) Oral three times a day  metroNIDAZOLE  IVPB 500 milliGRAM(s) IV Intermittent every 12 hours  multivitamin/minerals/iron Oral Solution (CENTRUM) 15 milliLiter(s) Oral daily  norepinephrine Infusion 0.05 MICROgram(s)/kG/Min IV Continuous <Continuous>  pantoprazole  Injectable 40 milliGRAM(s) IV Push two times a day  petrolatum Ophthalmic Ointment 1 Application(s) Both EYES two times a day  polyethylene glycol 3350 17 Gram(s) Oral two times a day  PrismaSATE Dialysate BK 0 / 3.5 5000 milliLiter(s) CRRT <Continuous>  PrismaSOL Filtration BGK 4 / 2.5 5000 milliLiter(s) CRRT <Continuous>  PrismaSOL Filtration BGK 4 / 2.5 5000 milliLiter(s) CRRT <Continuous>  propofol Infusion 49.946 MICROgram(s)/kG/Min IV Continuous <Continuous>  senna 2 Tablet(s) Oral at bedtime  thiamine 100 milliGRAM(s) Oral daily  vancomycin  IVPB 750 milliGRAM(s) IV Intermittent every 12 hours  vasopressin Infusion 0.04 Unit(s)/Min IV Continuous <Continuous>    PRN Inpatient Medications      REVIEW OF SYSTEMS  --------------------------------------------------------------------------------  Unable to obtain as pt is intubated and sedated    VITALS/PHYSICAL EXAM  --------------------------------------------------------------------------------  T(C): 37.8 (05-05-24 @ 08:00), Max: 38.4 (05-04-24 @ 16:00)  HR: 81 (05-05-24 @ 10:30) (75 - 93)  BP: --  RR: 28 (05-05-24 @ 10:00) (28 - 28)  SpO2: 99% (05-05-24 @ 10:30) (95% - 100%)  Wt(kg): --        05-04-24 @ 07:01  -  05-05-24 @ 07:00  --------------------------------------------------------  IN: 5908.3 mL / OUT: 9197 mL / NET: -3288.7 mL    05-05-24 @ 07:01  -  05-05-24 @ 10:36  --------------------------------------------------------  IN: 822 mL / OUT: 680 mL / NET: 142 mL      Physical Exam:  	Gen: sedated and intubated  	HEENT: NC/AT, +ETT, +OGT  	Pulm: Coarse breath sounds bilaterally  	CV: +S1S2  	Abd: +BS, soft  	: geronimo              Extremities: +bilateral LE edema noted               Neuro: sedated  	Skin: Warm and dry, +jaundice  	Dialysis access: RIJ non-tunneled cath    LABS/STUDIES  --------------------------------------------------------------------------------              7.4    16.81 >-----------<  68       [05-05-24 @ 03:49]              22.4     128  |  96  |  9   ----------------------------<  164      [05-05-24 @ 03:49]  4.3   |  20  |  0.94        Ca     8.3     [05-05-24 @ 03:49]      iCa    1.24     [05-05 @ 10:15]      Mg     2.20     [05-05-24 @ 03:49]      Phos  4.1     [05-05-24 @ 03:49]    TPro  6.2  /  Alb  3.1  /  TBili  28.1  /  DBili  x   /  AST  388  /  ALT  134  /  AlkPhos  75  [05-05-24 @ 03:49]    PT/INR: .0, INR 9.83       [05-05-24 @ 03:49]  PTT: 60.5       [05-05-24 @ 03:49]      Creatinine Trend:  SCr 0.94 [05-05 @ 03:49]  SCr 0.98 [05-04 @ 20:58]  SCr 1.00 [05-04 @ 14:45]  SCr 1.01 [05-04 @ 08:00]  SCr 1.03 [05-04 @ 04:11]    Urinalysis - [05-05-24 @ 03:49]      Color  / Appearance  / SG  / pH       Gluc 164 / Ketone   / Bili  / Urobili        Blood  / Protein  / Leuk Est  / Nitrite       RBC  / WBC  / Hyaline  / Gran  / Sq Epi  / Non Sq Epi  / Bacteria       Lipid: chol --, , HDL --, LDL --      [05-02-24 @ 22:00]      C4 Complement 9      [05-03-24 @ 22:03]

## 2024-05-05 NOTE — PROGRESS NOTE ADULT - PROBLEM SELECTOR PLAN 1
Pt. with LUBA in setting of hypotension and decompensated liver cirrhosis. Found to be anemic and thrombocytopenic. No obvious bleeding source on EGD. Patient developed ARDS due to liver failure and receiving multiple blood products, remains intubated. He was on Levo, Vaso, Octreotide, and Bumex gtts, currently on Levo. Was non-oliguric, but BUN/Cr continued to rise from 46/1.86 on admission to 70/2.55 on 4/29. Also was acidotic with pH 7.26. Urine studies significant for very low Na<20 suggestive of HRS as well. Despite adequate UOP initially, pt remained net positive and was in ARDS. Hence, CRRT initiated on 4/29/24 for fluid removal. Consent for CRRT was obtained from the father. Tolerating UF 100mL/h, no issues with CRRT catheter or filter clotting overnight. Will continue CRRT as per discussion with MICU team. Monitor BP and labs. Avoid nephrotoxins. Overall prognosis remains guarded.

## 2024-05-05 NOTE — PROGRESS NOTE ADULT - PROBLEM SELECTOR PLAN 2
Na 128 today. Would limit infusions constituted in D5w and also limit D10 infusion if possible.      Jolanta Mata, DO  Nephrology Fellow  Feel free to contact me directly on TEAMS with any questions.  (After 5pm or on weekends, please call the on-call fellow).

## 2024-05-05 NOTE — PROGRESS NOTE ADULT - ATTENDING COMMENTS
43M with hx of alcohol use disorder, restless leg syndrome, PVD, anxiety/PTSD ( service), PAD, taking adderall at home, presented as a transfer from [facility] ED for encephalopathy, acute decompensated liver failure and respiratory failure/ARDS (P/F was 70, improving) ; Variceal status: normal esophagus on EGD at OSH. pt transferred to [facility] for possible ECMO eval, intubated, s/p proning, episode of seizures 4/27, negative EEG. + RLL Pna vs pneumonitis d/t hematemesis. S/p ETT exchanged and bronch 5/3/24 for air leak.  Peak pressure 36 w/ Plateau 32; PEEP decreased to 10 and subsequently peak pressure 32 and plateau 29; modify vent setting per blood gas. Continue vasopressor and ventilatory support. LUBA likely multifactorial - ATN, hepatorenal, CRRT started 4/29/24 for fluid removal, acidosis (was non-oliguric), continuing goal for net negative, wean down FIO2 as tolerated. s/p vanco/azithro, restarted broaden to flagyl, cefepime, and vancomycin 5/2/24 for new fevers. UCx and BCx NGTD, f/u bronch cultures. + hypoglycemia in setting of AI, low am cortisol, start hydrocortisone. worsening INR, given vit K, FFP today,  prognosis guarded.

## 2024-05-06 NOTE — PROGRESS NOTE ADULT - SUBJECTIVE AND OBJECTIVE BOX
[facility] Division of Kidney Diseases & Hypertension  FOLLOW UP NOTE  [phone]--------------------------------------------------------------------------------  Chief Complaint: LUBA on CRRT    24 hour events/subjective: Remains intubated and on pressors. Oliguric and on CRRT, but tolerating UF 100mL/hr and pressor requirements slightly decreasing.    PAST HISTORY  --------------------------------------------------------------------------------  No significant changes to PMH, PSH, FHx, SHx, unless otherwise noted    ALLERGIES & MEDICATIONS  --------------------------------------------------------------------------------  Allergies    Allergy Status Unknown      Standing Inpatient Medications  albuterol/ipratropium for Nebulization 3 milliLiter(s) Nebulizer every 6 hours  cefepime   IVPB 2000 milliGRAM(s) IV Intermittent every 8 hours  chlorhexidine 0.12% Liquid 15 milliLiter(s) Oral Mucosa every 12 hours  chlorhexidine 2% Cloths 1 Application(s) Topical daily  CRRT Treatment    <Continuous>  dexMEDEtomidine Infusion 0.3 MICROgram(s)/kG/Hr IV Continuous <Continuous>  dextrose 10%. 1000 milliLiter(s) IV Continuous <Continuous>  folic acid 1 milliGRAM(s) Oral daily  hydrocortisone sodium succinate Injectable 100 milliGRAM(s) IV Push every 8 hours  ketamine Infusion. 1.5 mG/kG/Hr IV Continuous <Continuous>  lactulose Syrup 30 Gram(s) Oral three times a day  metroNIDAZOLE  IVPB 500 milliGRAM(s) IV Intermittent every 12 hours  multivitamin/minerals/iron Oral Solution (CENTRUM) 15 milliLiter(s) Oral daily  norepinephrine Infusion 0.05 MICROgram(s)/kG/Min IV Continuous <Continuous>  pantoprazole  Injectable 40 milliGRAM(s) IV Push two times a day  petrolatum Ophthalmic Ointment 1 Application(s) Both EYES two times a day  polyethylene glycol 3350 17 Gram(s) Oral two times a day  potassium chloride  20 mEq/100 mL IVPB 20 milliEquivalent(s) IV Intermittent every 2 hours  PrismaSATE Dialysate BGK 4 / 2.5 5000 milliLiter(s) CRRT <Continuous>  PrismaSOL Filtration BGK 4 / 2.5 5000 milliLiter(s) CRRT <Continuous>  PrismaSOL Filtration BGK 4 / 2.5 5000 milliLiter(s) CRRT <Continuous>  propofol Infusion 49.946 MICROgram(s)/kG/Min IV Continuous <Continuous>  senna 2 Tablet(s) Oral at bedtime  thiamine 100 milliGRAM(s) Oral daily  vancomycin  IVPB 750 milliGRAM(s) IV Intermittent every 12 hours  vasopressin Infusion 0.04 Unit(s)/Min IV Continuous <Continuous>    PRN Inpatient Medications      REVIEW OF SYSTEMS  --------------------------------------------------------------------------------  Unable to obtain as pt is intubated and sedated    VITALS/PHYSICAL EXAM  --------------------------------------------------------------------------------  T(C): 37.4 (05-06-24 @ 05:30), Max: 38.6 (05-05-24 @ 20:00)  HR: 78 (05-06-24 @ 06:00) (75 - 82)  BP: --  RR: 28 (05-06-24 @ 06:00) (28 - 28)  SpO2: 94% (05-06-24 @ 06:00) (93% - 100%)  Wt(kg): --        05-05-24 @ 07:01  -  05-06-24 @ 07:00  --------------------------------------------------------  IN: 5133.3 mL / OUT: 7504 mL / NET: -2370.7 mL      Physical Exam:  	Gen: sedated and intubated  	HEENT: NC/AT, +ETT, +OGT  	Pulm: Coarse breath sounds bilaterally  	CV: +S1S2  	Abd: +BS, soft  	: geronimo              Extremities: +bilateral LE edema noted               Neuro: sedated  	Skin: Warm and dry, +jaundice  	Dialysis access: RIJ non-tunneled cath    LABS/STUDIES  --------------------------------------------------------------------------------              7.1    14.42 >-----------<  47       [05-06-24 @ 02:30]              21.3     133  |  96  |  16  ----------------------------<  180      [05-06-24 @ 02:30]  3.1   |  20  |  0.92        Ca     8.5     [05-06-24 @ 02:30]      iCa    1.22     [05-06 @ 02:30]      Mg     2.20     [05-06-24 @ 02:30]      Phos  2.9     [05-06-24 @ 02:30]    TPro  6.2  /  Alb  3.0  /  TBili  29.1  /  DBili  x   /  AST  274  /  ALT  115  /  AlkPhos  70  [05-06-24 @ 02:30]    PT/INR: PT 65.3 , INR 6.09       [05-06-24 @ 02:30]  PTT: 52.6       [05-06-24 @ 02:30]      Creatinine Trend:  SCr 0.92 [05-06 @ 02:30]  SCr 0.98 [05-05 @ 20:30]  SCr 0.91 [05-05 @ 14:20]  SCr 0.93 [05-05 @ 10:15]  SCr 0.94 [05-05 @ 03:49]    Urinalysis - [05-06-24 @ 02:30]      Color  / Appearance  / SG  / pH       Gluc 180 / Ketone   / Bili  / Urobili        Blood  / Protein  / Leuk Est  / Nitrite       RBC  / WBC  / Hyaline  / Gran  / Sq Epi  / Non Sq Epi  / Bacteria       Lipid: chol --, , HDL --, LDL --      [05-02-24 @ 22:00]      C4 Complement 9      [05-03-24 @ 22:03] [facility] Division of Kidney Diseases & Hypertension  FOLLOW UP NOTE  [phone]--------------------------------------------------------------------------------  Chief Complaint: LUBA on CRRT    24 hour events/subjective: Had temp 101.5 F last night, cooling blanket placed. Remains intubated and on pressors. Oliguric and on CRRT, but tolerating UF 100mL/hr with total net negative balance of ~2.4L, and pressor requirements slightly decreasing.    PAST HISTORY  --------------------------------------------------------------------------------  No significant changes to PMH, PSH, FHx, SHx, unless otherwise noted    ALLERGIES & MEDICATIONS  --------------------------------------------------------------------------------  Allergies    Allergy Status Unknown      Standing Inpatient Medications  albuterol/ipratropium for Nebulization 3 milliLiter(s) Nebulizer every 6 hours  cefepime   IVPB 2000 milliGRAM(s) IV Intermittent every 8 hours  chlorhexidine 0.12% Liquid 15 milliLiter(s) Oral Mucosa every 12 hours  chlorhexidine 2% Cloths 1 Application(s) Topical daily  CRRT Treatment    <Continuous>  dexMEDEtomidine Infusion 0.3 MICROgram(s)/kG/Hr IV Continuous <Continuous>  dextrose 10%. 1000 milliLiter(s) IV Continuous <Continuous>  folic acid 1 milliGRAM(s) Oral daily  hydrocortisone sodium succinate Injectable 100 milliGRAM(s) IV Push every 8 hours  ketamine Infusion. 1.5 mG/kG/Hr IV Continuous <Continuous>  lactulose Syrup 30 Gram(s) Oral three times a day  metroNIDAZOLE  IVPB 500 milliGRAM(s) IV Intermittent every 12 hours  multivitamin/minerals/iron Oral Solution (CENTRUM) 15 milliLiter(s) Oral daily  norepinephrine Infusion 0.05 MICROgram(s)/kG/Min IV Continuous <Continuous>  pantoprazole  Injectable 40 milliGRAM(s) IV Push two times a day  petrolatum Ophthalmic Ointment 1 Application(s) Both EYES two times a day  polyethylene glycol 3350 17 Gram(s) Oral two times a day  potassium chloride  20 mEq/100 mL IVPB 20 milliEquivalent(s) IV Intermittent every 2 hours  PrismaSATE Dialysate BGK 4 / 2.5 5000 milliLiter(s) CRRT <Continuous>  PrismaSOL Filtration BGK 4 / 2.5 5000 milliLiter(s) CRRT <Continuous>  PrismaSOL Filtration BGK 4 / 2.5 5000 milliLiter(s) CRRT <Continuous>  propofol Infusion 49.946 MICROgram(s)/kG/Min IV Continuous <Continuous>  senna 2 Tablet(s) Oral at bedtime  thiamine 100 milliGRAM(s) Oral daily  vancomycin  IVPB 750 milliGRAM(s) IV Intermittent every 12 hours  vasopressin Infusion 0.04 Unit(s)/Min IV Continuous <Continuous>    PRN Inpatient Medications      REVIEW OF SYSTEMS  --------------------------------------------------------------------------------  Unable to obtain as pt is intubated and sedated    VITALS/PHYSICAL EXAM  --------------------------------------------------------------------------------  T(C): 37.4 (05-06-24 @ 05:30), Max: 38.6 (05-05-24 @ 20:00)  HR: 78 (05-06-24 @ 06:00) (75 - 82)  BP: --  RR: 28 (05-06-24 @ 06:00) (28 - 28)  SpO2: 94% (05-06-24 @ 06:00) (93% - 100%)  Wt(kg): --        05-05-24 @ 07:01  -  05-06-24 @ 07:00  --------------------------------------------------------  IN: 5133.3 mL / OUT: 7504 mL / NET: -2370.7 mL      Physical Exam:  	Gen: sedated and intubated  	HEENT: NC/AT, +ETT, +OGT  	Pulm: Coarse breath sounds bilaterally  	CV: +S1S2  	Abd: +BS, soft  	: geronimo              Extremities: +bilateral LE edema noted               Neuro: sedated  	Skin: Warm and dry, +jaundice  	Dialysis access: RIJ non-tunneled cath    LABS/STUDIES  --------------------------------------------------------------------------------              7.1    14.42 >-----------<  47       [05-06-24 @ 02:30]              21.3     133  |  96  |  16  ----------------------------<  180      [05-06-24 @ 02:30]  3.1   |  20  |  0.92        Ca     8.5     [05-06-24 @ 02:30]      iCa    1.22     [05-06 @ 02:30]      Mg     2.20     [05-06-24 @ 02:30]      Phos  2.9     [05-06-24 @ 02:30]    TPro  6.2  /  Alb  3.0  /  TBili  29.1  /  DBili  x   /  AST  274  /  ALT  115  /  AlkPhos  70  [05-06-24 @ 02:30]    PT/INR: PT 65.3 , INR 6.09       [05-06-24 @ 02:30]  PTT: 52.6       [05-06-24 @ 02:30]      Creatinine Trend:  SCr 0.92 [05-06 @ 02:30]  SCr 0.98 [05-05 @ 20:30]  SCr 0.91 [05-05 @ 14:20]  SCr 0.93 [05-05 @ 10:15]  SCr 0.94 [05-05 @ 03:49]    Urinalysis - [05-06-24 @ 02:30]      Color  / Appearance  / SG  / pH       Gluc 180 / Ketone   / Bili  / Urobili        Blood  / Protein  / Leuk Est  / Nitrite       RBC  / WBC  / Hyaline  / Gran  / Sq Epi  / Non Sq Epi  / Bacteria       Lipid: chol --, , HDL --, LDL --      [05-02-24 @ 22:00]      C4 Complement 9      [05-03-24 @ 22:03]

## 2024-05-06 NOTE — PROGRESS NOTE ADULT - ASSESSMENT
43M with hx of alcohol use disorder, restless leg syndrome, PVD, anxiety/PTSD ( service), PAD, taking adderall at home, presented as a transfer from [facility] ED for acute decompensated liver and respiratory failure and possible ECMO eval.    NEURO  # encephalopathy  - Pt encephalopathic on admission likely metabolic vs hepatic encephalopathy (ammonia 94). Intubated for airway protection  - s/p paralytic 5/1  - currently sedated (on , prop, ketamine), fent weaned, now wean ketamine  - maintain RAAS -2 to -3, wean per ICU protocol;  - started lactulose 5/4    #Seizures   - no documented history of seizures, appeared to be having seizure like activity during rounds 4/27  - s/o veeg negative  - monitor for now    CARDS  # shock  - likely vasoplegia iso sedation, currently on levo and vaso, stable.  - wean vasopressors per ICU protocol  - maintain MAP 65-70  - abx as below  - TTE 4/27 EF 64%, LAE, mild AR, mild MR    #Aortic calcification/?Bicuspid aortic valve  - Pt with aortic calcification on echo w/ possible bicuspid valve. +systolic murmur  - Echo w/o vegetation    PULM  # ARDS  - P/F reported to be 70 at [facility], reflecting severe ARDS. Now improved to 107  -- Peak pressure 36 w/ Plateau 32; PEEP decreased to 10 and subsequently peak pressure 32 and plateau 29; modify vent setting per blood gas  - POCUS w/ RLL consolidation. ARDS likely i/s/o PNA vs pneumonitis from hematemesis  - maintain lung protective ventilation, wean per ICU protocol  - s/p proning with improvement in oxygenation. Pt now w/ improving O2 requirements and plateau pressure  - Airleak overnight 5/3, ETT changed 5/3  - duonebs q6    RENAL  # renal failure, anuria  - sCr on admit 1.86, baseline unclear. Now worsening 2.55. Pt with decreasing UO s/p bumex gtt and metalazone  - likely hepatorenal syndrome, vs ATN from shock  --- s/p albumin x2 days, on octreotide and levophed  - Renal protective measures: Please renally dose all medications; Avoid nephrotoxic medications (NSAIDS, IV contrast); Avoid ACEi and ARBs in the setting of LUBA; Maintain MAP >65;   - Nephro c/s for CVVH;   - monitor strict I&O  - Bladder scan w/ ~400cc, will give FFP and put in a geronimo to prevent persistent trauma and bleeding from straight caths given elevated INR    #hyponatremia  Likely i/s/o hypotonic fluids and d10 gtt; will decrease d10 to 50cc/hr.   - Monitor BMPs    GI  # hematemesis  - EGD at OSH noted blood clots in oropharynx, no obvious bleeding source, normal esophagus  - no epistaxis noted on exam  - 4/28 increased bloody output from OGT, no bloody output currently  - Hepatology following, no plan for EGD today  - protonix 40 mg IVP BID  - NPO    # acute liver failure  - likely i/s/o alcohol use; AST / ALT 2:1 ratio. Bilirubin 28   - Last EtOH use: reportedly 4/20/24 per documentation from [facility]  - Smooth muscle ab 1:20   - MELD-Na on admission: 33, trend    - Variceal status: normal esophagus on EGD at OSH  - Per hepatology discontinue NAC  -- If pt improves, may be a candidate for transplant given high MELD per hepatology  - GI following, will appreciate recs  - max tylenol 2000 mg daily    #Bowel regimen  - Pt without BM, s/p relistor i/s/o fent use; now off of fent  - c/w miralax, senna, lactulose    ID  # Sepsis/septic shock  - POCUS w/ RLL consolidation, possible cause of infection  - UCx and BCx NGTD,   - s/p vanco and azithro, d/c iso neg legionella and mrsa  - previously on zosyn (4/27 - 5/3); see abx below  - Fungitell 200, can be falsely elevated. Will dc fluconazole given elevated LFTs  Pt n/ new fevers o/n 5/2, broaden to flagyl, cefepime, and vancomycin  - f/u infx work-up    ENDO  # hypocortisoliemia  - 8 AM cortisol 8.7  - solucortef 100mg IVP q8 started 5/4    #Low TSH  - TSH 0.10, T4 WNL    #hypoglycemia  likely i/s/o liver failure and not on tube feeds  - start trickle feeds at 10cc  - D10 at 75cc, increase as tolerated    #hypoglycemia  - likely i/s/o liver failure, AI, and full feeds being held for gut motility issue  -- tube feeds + d10    HEME/ONC  # bicytopenia (anemia and thrombocytopenia)  # elevated INR  - likely iso liver failure vs acute bleed at OSH  - s/p 4 u prbc and 2 u FFP at OSH. received 1 u prbc, 1 u plt, and 1 u FFP on 4/27  - shiley placement and exchange of lines  -- s/p additional 3u FFP now, 1u plt during shiley palcement, and 1u pRBC 4/29  - transfuse for hb <7, plt <50 for bleeding  - monitor fibrinogen QD  - Vitamin K x2 (4/28-4/29), another dose given overnight 5/5    #Portal vein thrombosis  - Evidence of portal vein thrombosis on US abdomen  - hold AC at this time iso upper GIB    DERM  # jaundice  - likely iso elevated bilirubin, acute liver failure    #B/l LE edema  - [facility] duplex negative    DVT: dc heparin given elevated INR and decreasing plt  FENGI: tube feeds  LDA: L IJ and radial a-line and R shiley  GTT: prop,, levo, vaso, ketamine  GOC: Full code for now, family: father Justice and has a brother. Case discussed with father and updates given.   43M with hx of alcohol use disorder, restless leg syndrome, PVD, anxiety/PTSD ( service), PAD, taking adderall at home, presented as a transfer from [facility] ED for acute decompensated liver and respiratory failure and possible ECMO eval.    NEURO  # encephalopathy  - Pt encephalopathic on admission likely metabolic vs hepatic encephalopathy (ammonia 94). Intubated for airway protection  - s/p paralytic 5/1  - currently sedated (on , prop, ketamine), fent weaned, now wean propofol  - maintain RAAS -2, wean per ICU protocol;  - started lactulose 5/4    #Seizures   - no documented history of seizures, appeared to be having seizure like activity during rounds 4/27  - s/o veeg negative  - monitor for now    CARDS  # shock  - likely vasoplegia + sepsis (given fevers and leukocytosis), currently on levo and vaso, stable.  - wean vasopressors per ICU protocol  - maintain MAP 65-70  - abx as below  - TTE 4/27 EF 64%, LAE, mild AR, mild MR    #Aortic calcification/?Bicuspid aortic valve  - Pt with aortic calcification on echo w/ possible bicuspid valve. +systolic murmur  - Echo w/o vegetation    PULM  # ARDS  - P/F reported to be 70 at [facility], reflecting severe ARDS. Now improved to 107  -- Peak pressure 36 w/ Plateau 32; PEEP decreased to 10 and subsequently peak pressure 32 and plateau 29; modify vent setting per blood gas  - POCUS w/ RLL consolidation. ARDS likely i/s/o PNA vs pneumonitis from hematemesis  - maintain lung protective ventilation, wean per ICU protocol  - s/p proning with improvement in oxygenation. Pt now w/ improving O2 requirements and plateau pressure  - Airleak overnight 5/3, ETT changed 5/3  - duonebs q6    RENAL  # renal failure, anuria  - sCr on admit 1.86, baseline unclear. Now worsening 2.55. Pt with decreasing UO s/p bumex gtt and metolazone  - likely hepatorenal syndrome, vs ATN from shock  --- s/p albumin x2 days, on octreotide and levophed  - Renal protective measures: Please renally dose all medications; Avoid nephrotoxic medications (NSAIDS, IV contrast); Avoid ACEi and ARBs in the setting of LUBA; Maintain MAP >65;   - Nephro c/s for CVVH; will continue w/ CVVH for now given pressor requirements and poor UO  - monitor strict I&O  - Bladder scan w/ ~400cc, will give FFP and put in a geronimo to prevent persistent trauma and bleeding from straight caths given elevated INR    #hyponatremia  Likely i/s/o hypotonic fluids and d10 gtt; will decrease d10 to 50cc/hr.   - Monitor BMPs    GI  # hematemesis  - EGD at OSH noted blood clots in oropharynx, no obvious bleeding source, normal esophagus  - no epistaxis noted on exam  - 4/28 increased bloody output from OGT, improved  - Hepatology following, no plan for EGD  - protonix 40 mg IVP BID  - NPO given new hematemesis 5/6 o/n. Will discuss w/ hepatology    # acute liver failure  - likely i/s/o alcohol use; AST / ALT 2:1 ratio. Bilirubin 28   - Last EtOH use: reportedly 4/20/24 per documentation from [facility]  - Smooth muscle ab 1:20   - MELD-Na on admission: 33, trend    - Variceal status: normal esophagus on EGD at OSH  - Per hepatology discontinue NAC  -- If pt improves, may be a candidate for transplant given high MELD per hepatology  - GI following, will appreciate recs  - max tylenol 2000 mg daily    #Bowel regimen  - Pt without BM, s/p relistor x2 i/s/o fent use; now off of fent  - c/w miralax, senna, lactulose  - s/p enema  - obtain abdominal xray    ID  # Sepsis/septic shock  - POCUS w/ RLL consolidation, possible cause of infection  - UCx and BCx NGTD,   - s/p vanco and azithro, d/c iso neg legionella and mrsa  - previously on zosyn (4/27 - 5/3); see abx below  - Fungitell 200, can be falsely elevated. Will dc fluconazole given elevated LFTs  Pt n/ new fevers o/n 5/2, broaden to flagyl, cefepime, and vancomycin  -- Vancomycin level today at 22:00  - f/u infx work-up  -- will send UA, MRSA, sputum cx, RVP    ENDO  # hypocortisoliemia  - 8 AM cortisol 8.7  - solucortef 50mg q6    #Low TSH  - TSH 0.10, T4 WNL    #hypoglycemia  likely i/s/o liver failure and not on tube feeds  - start trickle feeds at 10cc  - D10 at 50cc, increase as tolerated    #hypoglycemia  - likely i/s/o liver failure, AI, and full feeds being held for gut motility issue  - c/w d10; hold tube feeds i/s/o hematemesis    HEME/ONC  # bicytopenia (anemia and thrombocytopenia)  # elevated INR  - likely iso liver failure vs acute bleed at OSH  - s/p 4 u prbc and 2 u FFP at OSH. received 1 u prbc, 1 u plt, and 1 u FFP on 4/27  - shiley placement and exchange of lines  -- s/p additional 3u FFP now, 1u plt during shiley palcement, and 1u pRBC 4/29  - transfuse for hb <7, plt <50 for bleeding  - monitor fibrinogen QD  - Vitamin K x3 (4/28-4/29, 5/5)  - s/p cyro and 1u plt 5/16; will given 1u pRBC    #Portal vein thrombosis  - Evidence of portal vein thrombosis on US abdomen  - hold AC at this time iso upper GIB    DERM  # jaundice  - likely iso elevated bilirubin, acute liver failure    #B/l LE edema  - VA duplex negative    DVT: dc heparin given elevated INR and decreasing plt  FENGI: tube feeds  LDA: L IJ and radial a-line and R shiley  GTT: prop,, levo, vaso, ketamine  GOC: Full code for now, family: father Justice and has a brother. Case discussed with father and updates given.

## 2024-05-06 NOTE — PROGRESS NOTE ADULT - SUBJECTIVE AND OBJECTIVE BOX
Carlos Ponce  PGY-3 | Internal Medicine      INTERVAL HPI/OVERNIGHT EVENTS:    SUBJECTIVE: Patient seen and examined at bedside.     CONSTITUTIONAL: No weakness, fevers or chills  EYES/ENT: No visual changes;  No vertigo or throat pain   NECK: No pain or stiffness  RESPIRATORY: No cough, wheezing, hemoptysis; No shortness of breath  CARDIOVASCULAR: No chest pain or palpitations  GASTROINTESTINAL: No abdominal or epigastric pain. No nausea, vomiting, or hematemesis; No diarrhea or constipation. No melena or hematochezia.  GENITOURINARY: No dysuria, frequency or hematuria  NEUROLOGICAL: No numbness or weakness  SKIN: No itching, rashes    OBJECTIVE:    VITAL SIGNS:  ICU Vital Signs Last 24 Hrs  T(C): 37.4 (06 May 2024 05:30), Max: 38.6 (05 May 2024 20:00)  T(F): 40 (06 May 2024 05:30), Max: 101.5 (05 May 2024 20:00)  HR: 78 (06 May 2024 06:00) (75 - 82)  BP: --  BP(mean): --  ABP: 118/50 (06 May 2024 06:00) (110/46 - 130/62)  ABP(mean): 71 (06 May 2024 06:00) (67 - 84)  RR: 28 (06 May 2024 06:00) (28 - 28)  SpO2: 94% (06 May 2024 06:00) (93% - 100%)    O2 Parameters below as of 06 May 2024 06:00  Patient On (Oxygen Delivery Method): ventilator    O2 Concentration (%): 40      Mode: AC/ CMV (Assist Control/ Continuous Mandatory Ventilation), RR (machine): 28, TV (machine): 430, FiO2: 40, PEEP: 8, ITime: 0.7, MAP: 14, PIP: 29    05-05 @ 07:01  -  05-06 @ 07:00  --------------------------------------------------------  IN: 5133.3 mL / OUT: 7504 mL / NET: -2370.7 mL      CAPILLARY BLOOD GLUCOSE          PHYSICAL EXAM:    General: NAD  HEENT: NC/AT; PERRL, clear conjunctiva  Neck: supple  Respiratory: CTA b/l  Cardiovascular: +S1/S2; RRR  Abdomen: soft, NT/ND; +BS x4  Extremities: WWP, 2+ peripheral pulses b/l; no LE edema  Skin: normal color and turgor; no rash  Neurological:    MEDICATIONS:  MEDICATIONS  (STANDING):  albuterol/ipratropium for Nebulization 3 milliLiter(s) Nebulizer every 6 hours  cefepime   IVPB 2000 milliGRAM(s) IV Intermittent every 8 hours  chlorhexidine 0.12% Liquid 15 milliLiter(s) Oral Mucosa every 12 hours  chlorhexidine 2% Cloths 1 Application(s) Topical daily  CRRT Treatment    <Continuous>  dexMEDEtomidine Infusion 0.3 MICROgram(s)/kG/Hr (6.91 mL/Hr) IV Continuous <Continuous>  dextrose 10%. 1000 milliLiter(s) (50 mL/Hr) IV Continuous <Continuous>  folic acid 1 milliGRAM(s) Oral daily  hydrocortisone sodium succinate Injectable 100 milliGRAM(s) IV Push every 8 hours  ketamine Infusion. 1.5 mG/kG/Hr (13.8 mL/Hr) IV Continuous <Continuous>  lactulose Syrup 30 Gram(s) Oral three times a day  metroNIDAZOLE  IVPB 500 milliGRAM(s) IV Intermittent every 12 hours  multivitamin/minerals/iron Oral Solution (CENTRUM) 15 milliLiter(s) Oral daily  norepinephrine Infusion 0.05 MICROgram(s)/kG/Min (4.32 mL/Hr) IV Continuous <Continuous>  pantoprazole  Injectable 40 milliGRAM(s) IV Push two times a day  petrolatum Ophthalmic Ointment 1 Application(s) Both EYES two times a day  polyethylene glycol 3350 17 Gram(s) Oral two times a day  potassium chloride  20 mEq/100 mL IVPB 20 milliEquivalent(s) IV Intermittent every 2 hours  PrismaSATE Dialysate BGK 4 / 2.5 5000 milliLiter(s) (2500 mL/Hr) CRRT <Continuous>  PrismaSOL Filtration BGK 4 / 2.5 5000 milliLiter(s) (200 mL/Hr) CRRT <Continuous>  PrismaSOL Filtration BGK 4 / 2.5 5000 milliLiter(s) (2000 mL/Hr) CRRT <Continuous>  propofol Infusion 49.946 MICROgram(s)/kG/Min (27.6 mL/Hr) IV Continuous <Continuous>  senna 2 Tablet(s) Oral at bedtime  thiamine 100 milliGRAM(s) Oral daily  vancomycin  IVPB 750 milliGRAM(s) IV Intermittent every 12 hours  vasopressin Infusion 0.04 Unit(s)/Min (6 mL/Hr) IV Continuous <Continuous>    MEDICATIONS  (PRN):      ALLERGIES:  Allergies    Allergy Status Unknown    Intolerances        LABS:                        7.1    14.42 )-----------( 47       ( 06 May 2024 02:30 )             21.3     05-06    133<L>  |  96<L>  |  16  ----------------------------<  180<H>  3.1<L>   |  20<L>  |  0.92    Ca    8.5      06 May 2024 02:30  Phos  2.9     05-06  Mg     2.20     05-06    TPro  6.2  /  Alb  3.0<L>  /  TBili  29.1<H>  /  DBili  x   /  AST  274<H>  /  ALT  115<H>  /  AlkPhos  70  05-06    PT/INR - ( 06 May 2024 02:30 )   PT: 65.3 sec;   INR: 6.09 ratio         PTT - ( 06 May 2024 02:30 )  PTT:52.6 sec  Urinalysis Basic - ( 06 May 2024 02:30 )    Color: x / Appearance: x / SG: x / pH: x  Gluc: 180 mg/dL / Ketone: x  / Bili: x / Urobili: x   Blood: x / Protein: x / Nitrite: x   Leuk Esterase: x / RBC: x / WBC x   Sq Epi: x / Non Sq Epi: x / Bacteria: x        RADIOLOGY & ADDITIONAL TESTS: Reviewed. Carlos Ponce  PGY-3 | Internal Medicine      INTERVAL HPI/OVERNIGHT EVENTS: Febrile overnight to 38.4. Pt w/ some blood from NGT during suction s/p cryo and plt. This AM, hgb 6.8, gave 1u pRBC. CVVH at -100cc/hr    SUBJECTIVE: Patient seen and examined at bedside.     Patient is intubated and sedated    OBJECTIVE:    VITAL SIGNS:  ICU Vital Signs Last 24 Hrs  T(C): 37.4 (06 May 2024 05:30), Max: 38.6 (05 May 2024 20:00)  T(F): 40 (06 May 2024 05:30), Max: 101.5 (05 May 2024 20:00)  HR: 78 (06 May 2024 06:00) (75 - 82)  BP: --  BP(mean): --  ABP: 118/50 (06 May 2024 06:00) (110/46 - 130/62)  ABP(mean): 71 (06 May 2024 06:00) (67 - 84)  RR: 28 (06 May 2024 06:00) (28 - 28)  SpO2: 94% (06 May 2024 06:00) (93% - 100%)    O2 Parameters below as of 06 May 2024 06:00  Patient On (Oxygen Delivery Method): ventilator    O2 Concentration (%): 40      Mode: AC/ CMV (Assist Control/ Continuous Mandatory Ventilation), RR (machine): 28, TV (machine): 430, FiO2: 40, PEEP: 8, ITime: 0.7, MAP: 14, PIP: 29    05-05 @ 07:01  -  05-06 @ 07:00  --------------------------------------------------------  IN: 5133.3 mL / OUT: 7504 mL / NET: -2370.7 mL      CAPILLARY BLOOD GLUCOSE          PHYSICAL EXAM:  GENERAL: intubated, sedated  HENMT: Atraumatic, moist mucous membranes, no oropharyngeal exudates or vesicles, uvula is midline EYES: scleral icterus, PERRL,  HEART: RRR, S1/S2, no murmur/gallops/rubs  RESPIRATORY: Clear to auscultation bilaterally, no wheezes/rhonchi/rales  ABDOMEN: soft, nontender, protuberant  EXTREMITIES: 1+ lower extremity edema, +2 radial pulses b/l  NEURO:  intubated, sedated   Heme/LYMPH: ecchymosis on RLE/calf area  SKIN: jaundiced    MEDICATIONS:  MEDICATIONS  (STANDING):  albuterol/ipratropium for Nebulization 3 milliLiter(s) Nebulizer every 6 hours  cefepime   IVPB 2000 milliGRAM(s) IV Intermittent every 8 hours  chlorhexidine 0.12% Liquid 15 milliLiter(s) Oral Mucosa every 12 hours  chlorhexidine 2% Cloths 1 Application(s) Topical daily  CRRT Treatment    <Continuous>  dexMEDEtomidine Infusion 0.3 MICROgram(s)/kG/Hr (6.91 mL/Hr) IV Continuous <Continuous>  dextrose 10%. 1000 milliLiter(s) (50 mL/Hr) IV Continuous <Continuous>  folic acid 1 milliGRAM(s) Oral daily  hydrocortisone sodium succinate Injectable 100 milliGRAM(s) IV Push every 8 hours  ketamine Infusion. 1.5 mG/kG/Hr (13.8 mL/Hr) IV Continuous <Continuous>  lactulose Syrup 30 Gram(s) Oral three times a day  metroNIDAZOLE  IVPB 500 milliGRAM(s) IV Intermittent every 12 hours  multivitamin/minerals/iron Oral Solution (CENTRUM) 15 milliLiter(s) Oral daily  norepinephrine Infusion 0.05 MICROgram(s)/kG/Min (4.32 mL/Hr) IV Continuous <Continuous>  pantoprazole  Injectable 40 milliGRAM(s) IV Push two times a day  petrolatum Ophthalmic Ointment 1 Application(s) Both EYES two times a day  polyethylene glycol 3350 17 Gram(s) Oral two times a day  potassium chloride  20 mEq/100 mL IVPB 20 milliEquivalent(s) IV Intermittent every 2 hours  PrismaSATE Dialysate BGK 4 / 2.5 5000 milliLiter(s) (2500 mL/Hr) CRRT <Continuous>  PrismaSOL Filtration BGK 4 / 2.5 5000 milliLiter(s) (200 mL/Hr) CRRT <Continuous>  PrismaSOL Filtration BGK 4 / 2.5 5000 milliLiter(s) (2000 mL/Hr) CRRT <Continuous>  propofol Infusion 49.946 MICROgram(s)/kG/Min (27.6 mL/Hr) IV Continuous <Continuous>  senna 2 Tablet(s) Oral at bedtime  thiamine 100 milliGRAM(s) Oral daily  vancomycin  IVPB 750 milliGRAM(s) IV Intermittent every 12 hours  vasopressin Infusion 0.04 Unit(s)/Min (6 mL/Hr) IV Continuous <Continuous>    MEDICATIONS  (PRN):      ALLERGIES:  Allergies    Allergy Status Unknown    Intolerances        LABS:                        7.1    14.42 )-----------( 47       ( 06 May 2024 02:30 )             21.3     05-06    133<L>  |  96<L>  |  16  ----------------------------<  180<H>  3.1<L>   |  20<L>  |  0.92    Ca    8.5      06 May 2024 02:30  Phos  2.9     05-06  Mg     2.20     05-06    TPro  6.2  /  Alb  3.0<L>  /  TBili  29.1<H>  /  DBili  x   /  AST  274<H>  /  ALT  115<H>  /  AlkPhos  70  05-06    PT/INR - ( 06 May 2024 02:30 )   PT: 65.3 sec;   INR: 6.09 ratio         PTT - ( 06 May 2024 02:30 )  PTT:52.6 sec  Urinalysis Basic - ( 06 May 2024 02:30 )    Color: x / Appearance: x / SG: x / pH: x  Gluc: 180 mg/dL / Ketone: x  / Bili: x / Urobili: x   Blood: x / Protein: x / Nitrite: x   Leuk Esterase: x / RBC: x / WBC x   Sq Epi: x / Non Sq Epi: x / Bacteria: x        RADIOLOGY & ADDITIONAL TESTS: Reviewed.

## 2024-05-06 NOTE — PROGRESS NOTE ADULT - PROBLEM SELECTOR PLAN 2
Na 128 on 5/5. Would limit infusions constituted in D5w and also limit D10 infusion if possible. Na has improved to 133 today after decreasing rate of D10. Continue to monitor.      Jolanta Mata, DO  Nephrology Fellow  Feel free to contact me directly on TEAMS with any questions.  (After 5pm or on weekends, please call the on-call fellow).

## 2024-05-06 NOTE — CHART NOTE - NSCHARTNOTEFT_GEN_A_CORE
: Suha Martinez PA-C    INDICATION: Respiratory failure     PROCEDURE:  [X] LIMITED ECHO  [X] LIMITED CHEST  [ ] LIMITED RETROPERITONEAL  [ ] LIMITED ABDOMINAL  [ ] LIMITED DVT  [ ] NEEDLE GUIDANCE VASCULAR  [ ] NEEDLE GUIDANCE THORACENTESIS  [ ] NEEDLE GUIDANCE PARACENTESIS  [ ] NEEDLE GUIDANCE PERICARDIOCENTESIS  [ ] OTHER    FINDINGS:    Grossly normal LV systolic function. Mildly reduced RV systolic function. IVC 2.31 without respiratory variation. Scattered B-lines to bilateral anterior chest wall.     INTERPRETATION:    Grossly normal cardiac systolic function with abnormal aeration pattern to lungs B/L : Suha Martinez PA-C    INDICATION: acute hypoxemic respiratory failure      PROCEDURE:  [X] LIMITED ECHO  [X] LIMITED CHEST  [ ] LIMITED RETROPERITONEAL  [ ] LIMITED ABDOMINAL  [ ] LIMITED DVT  [ ] NEEDLE GUIDANCE VASCULAR  [ ] NEEDLE GUIDANCE THORACENTESIS  [ ] NEEDLE GUIDANCE PARACENTESIS  [ ] NEEDLE GUIDANCE PERICARDIOCENTESIS  [ ] OTHER    FINDINGS:    Grossly normal LV systolic function. Mildly reduced RV systolic function and mildly dilated approaching LV size. IVC 2.31 without respiratory variation. Scattered B-lines to bilateral anterior chest wall.     INTERPRETATION:    Grossly normal cardiac systolic function with abnormal aeration pattern to lungs B/L    Attending Attestation:  I was present during the key portions of the procedure and immediately available during the entire procedure.  Jazmine Murphy MD  Attending  Pulmonary & Critical Care Medicine

## 2024-05-06 NOTE — CHART NOTE - NSCHARTNOTEFT_GEN_A_CORE
Hepatology team was notified that patient today developed hematemesis with progressive anemia requiring transfusion. Patient with ~300 cc of bloody output from OGT associated with drop in hgb 7.1>>6.7 despite 1 unit pRBC. Patient also given PLT and FFP.  Hemodynamics and oxygenation remain relatively stable. On IV PPI BID.  Clinical updates were discussed with patient's father and brother, both globally and specifically regarding this bleeding episode. Despite patient's guarded prognosis, he remains full code with desire to proceed with medical interventions. R/B/A to upper endoscopy were discussed including risks of bleeding, infection, perforation and not finding a bleeding source that may be treated endoscopically. Family wishes to proceed with endoscopy to diagnose, prognosticate and potentially treat upper GI bleeding. Discussed with ICU team. Patient to be given 1 unit pRBC, 1 unit PLT, 2 units FFP and Reglan prior to bedside endoscopy this afternoon.    Jurgen Ross MD  Gastroenterology/Hepatology Fellow

## 2024-05-06 NOTE — PROGRESS NOTE ADULT - PROBLEM SELECTOR PLAN 1
Pt. with LUBA in setting of hypotension and decompensated liver cirrhosis. Found to be anemic and thrombocytopenic. No obvious bleeding source on EGD. Patient developed ARDS due to liver failure and receiving multiple blood products, remains intubated. He was on Levo, Vaso, Octreotide, and Bumex gtts, currently on Levo. Was non-oliguric, but BUN/Cr continued to rise from 46/1.86 on admission to 70/2.55 on 4/29. Also was acidotic with pH 7.26. Urine studies significant for very low Na<20 suggestive of HRS as well. Despite adequate UOP initially, pt remained net positive and was in ARDS. Hence, CRRT initiated on 4/29/24 for fluid removal. Consent for CRRT was obtained from the father. Tolerating UF 100mL/h, no issues with CRRT catheter or filter clotting overnight. Will continue CRRT as per discussion with MICU team. Adjusted dialysate bags for hypokalemia (K 3.1). Monitor BP and labs. Avoid nephrotoxins. Overall prognosis remains guarded.

## 2024-05-06 NOTE — PROGRESS NOTE ADULT - ASSESSMENT
Mr. Burns is a 43 year old male with alcohol use disorder, restless leg syndrome, PVD, anxiety/PTSD [facility] from [facility] for respiratory failure and ECMO evaluation in the context of liver failure. Patient presented to OSH on 4/25 for chest pain with a hospital course c/b hematemesis, respiratory failure and hepatic dysfunction.     #Multi-organ failure   #Acute on chronic liver failure (ACLF)  #ARDS, on MV  #Circulatory shock, on two vasopressors   #LUBA-D, on CRRT   #Adrenal insufficiency, on Solu-Cortef  #Alcohol-related cirrhosis  #Hematemesis, resolved   Patient with jaundice, coagulopathy and encephalopathy i/s/o alcohol use disorder and cirrhosis. Chronicity of underlying liver disease is unclear at this time. Suspect ACLF due to alcoholic hepatitis and underlying cirrhosis. RUQ US with hepatic steatosis and no ascites. CT at [facility] reports nodular liver and splenomegaly, likely cirrhotic. Doppler US with possible PVT, however not noted on CT from [facility].  MELD 3.0 score is 34>>50. Patient underwent EGD at OSH for hematemesis with findings of PHG, normal esophagus and hematin in the gastric fundus without actively bleeding source. Esophageal varices were not reported. Gastric varices are not entirely excluded.  Tylenol level and salicylate level were undetectable on 4/27. Acute viral hepatitis panel negative. HBcAb nonreactive. ASMA/AMA negative. UTox negative. HIV/CMV/EBV negative. Ceruloplasmin normal. Ferritin elevation not c/w hemochromatosis. TTE with severely dilated LV, mildly enlarged RV. MDF >80, however not a candidate for prednisone therapy for alcoholic hepatitis due to recent bleeding and critical illness. Patient remains intubated, ventilated and sedated on multiple vasopressors. Now s/p proning and initiation of CVVHD 4/29.  SUNDAY-C ACLF Score is 75 (calculated 5/3), predicting a 98% probability of death at 1 month. Prognosis remains guarded.     Recommendations:  - Ongoing goals of care   - Hemodynamic and respiratory support per ICU team  - Trickle feeds via OG tube   - Continue empiric antimicrobials   - IV PPI BID, monitor for recurrent bleeding  - Maintain active T&S and transfuse blood products as needed  - Bowel regimen   - No anticoagulation for possible PVT noted on doppler as not detected on CT, patient with recent bleed and ongoing coagulopathy  - Trend CBC, CMP, INR, Mg, Phos  - CRRT per Nephrology  - Not a liver transplant candidate due to critical illness, multi-organ failure and guarded prognosis     Jurgen Ross MD  Gastroenterology/Hepatology Fellow  Available via [employer] Teams  Pager: [phone]    On Weekdays after 5 PM to 8AM and Weekends/Holidays (All day)  For non-urgent consults, please email [email or web address] or [email or web address]  For urgent consults, please contact on call GI team.  See Amion schedule ([facility]), Pin[employer] paging system - [phone] ([facility]), or call hospital  ([facility])

## 2024-05-06 NOTE — PROGRESS NOTE ADULT - ATTENDING COMMENTS
43 M with etoh use d/o, PTSD went to [place] for GI bleed c/w acute decompensated liver failure and acute hypoxemic respiratory failure with acute UGIB requiring intubation, tx here, course c/b LUBA requiring dialysis, shock    Patient still with liver fialure  not able to meaningfully wake up  still requiring HD  concern for adrenal insufficiency  still with acute metabolic encephalopathy due to hyperammonemia, refractory to CRRT  having bloody output from OG tube    # acute hypoxemic respiratory failure  # acute metabolic encephalopathy  # hepatic encephalopathy  # LUBA  # acute UGIB  - c/w full vent support  - wean sedation as tolerated  - c/w CRRT for fluid removal and for hyperammonemia, LUBA  - c/w broad abx, f/u sputum culture  - on steroids for AI    full code per dad and patient's brother, ongoing GOC 43 M with etoh use d/o, PTSD went to [place] for GI bleed c/w acute decompensated liver failure and acute hypoxemic respiratory failure with acute UGIB requiring intubation, tx here, course c/b LUBA requiring dialysis, shock    Patient still with liver fialure  not able to meaningfully wake up  still requiring HD  concern for adrenal insufficiency  still with acute metabolic encephalopathy due to hyperammonemia, refractory to CRRT  having bloody output from OG tube    # acute hypoxemic respiratory failure  # acute metabolic encephalopathy  # hepatic encephalopathy  # LUBA  # acute UGIB  - c/w full vent support  - wean sedation as tolerated  - c/w CRRT for fluid removal and for hyperammonemia, LUBA  - c/w broad abx, f/u sputum culture  - on steroids for AI  - just ppi for ugib per hepatology, may scope tomorrow    full code per dad and patient's brother, ongoing GOC

## 2024-05-06 NOTE — PROGRESS NOTE ADULT - SUBJECTIVE AND OBJECTIVE BOX
Interval Events:   Patient with ongoing respiratory, circulatory and renal failure  Started on Solu-Cortef for adrenal insufficiency   Progressive coagulopathy to INR of 9.8 s/p IV Vitamin K with improvement to 4.7   MELD 50  Guarded prognosis per ICU team    Hospital Medications:  albuterol/ipratropium for Nebulization 3 milliLiter(s) Nebulizer every 6 hours  cefepime   IVPB 2000 milliGRAM(s) IV Intermittent every 8 hours  chlorhexidine 0.12% Liquid 15 milliLiter(s) Oral Mucosa every 12 hours  chlorhexidine 2% Cloths 1 Application(s) Topical daily  CRRT Treatment    <Continuous>  dexMEDEtomidine Infusion 0.3 MICROgram(s)/kG/Hr IV Continuous <Continuous>  dextrose 10%. 1000 milliLiter(s) IV Continuous <Continuous>  folic acid 1 milliGRAM(s) Oral daily  hydrocortisone sodium succinate Injectable 100 milliGRAM(s) IV Push every 8 hours  ketamine Infusion. 1.5 mG/kG/Hr IV Continuous <Continuous>  lactulose Syrup 30 Gram(s) Oral three times a day  metroNIDAZOLE  IVPB 500 milliGRAM(s) IV Intermittent every 12 hours  multivitamin/minerals/iron Oral Solution (CENTRUM) 15 milliLiter(s) Oral daily  norepinephrine Infusion 0.05 MICROgram(s)/kG/Min IV Continuous <Continuous>  pantoprazole  Injectable 40 milliGRAM(s) IV Push two times a day  petrolatum Ophthalmic Ointment 1 Application(s) Both EYES two times a day  polyethylene glycol 3350 17 Gram(s) Oral two times a day  PrismaSATE Dialysate BGK 4 / 2.5 5000 milliLiter(s) CRRT <Continuous>  PrismaSOL Filtration BGK 4 / 2.5 5000 milliLiter(s) CRRT <Continuous>  PrismaSOL Filtration BGK 4 / 2.5 5000 milliLiter(s) CRRT <Continuous>  propofol Infusion 49.946 MICROgram(s)/kG/Min IV Continuous <Continuous>  senna 2 Tablet(s) Oral at bedtime  thiamine 100 milliGRAM(s) Oral daily  vancomycin  IVPB 750 milliGRAM(s) IV Intermittent every 12 hours  vasopressin Infusion 0.04 Unit(s)/Min IV Continuous <Continuous>    ROS: unable to obtain    PHYSICAL EXAM:   Vital Signs:  Vital Signs Last 24 Hrs  T(C): 36.9 (06 May 2024 08:00), Max: 38.6 (05 May 2024 20:00)  T(F): 98.4 (06 May 2024 08:00), Max: 101.5 (05 May 2024 20:00)  HR: 89 (06 May 2024 09:04) (76 - 90)  BP: --  BP(mean): --  RR: 28 (06 May 2024 09:00) (28 - 28)  SpO2: 95% (06 May 2024 09:04) (93% - 100%)    Parameters below as of 06 May 2024 09:04  Patient On (Oxygen Delivery Method): ventilator    GENERAL:  intubated, sedated  HEENT:  sclera icteric  RESPIRATORY:  MV via ETT  CARDIAC:  on multiple vasopressors  SKIN: Jaundiced  NEURO:  sedated    LABS:                        7.1    14.42 )-----------( 47       ( 06 May 2024 02:30 )             21.3     Mean Cell Volume: 92.2 fL (05-06-24 @ 02:30)    05-06    133<L>  |  96<L>  |  16  ----------------------------<  180<H>  3.1<L>   |  20<L>  |  0.92    Ca    8.5      06 May 2024 02:30  Phos  2.9     05-06  Mg     2.20     05-06    TPro  6.2  /  Alb  3.0<L>  /  TBili  29.1<H>  /  DBili  x   /  AST  274<H>  /  ALT  115<H>  /  AlkPhos  70  05-06    LIVER FUNCTIONS - ( 06 May 2024 02:30 )  Alb: 3.0 g/dL / Pro: 6.2 g/dL / ALK PHOS: 70 U/L / ALT: 115 U/L / AST: 274 U/L / GGT: x           PT/INR - ( 06 May 2024 08:45 )   PT: 50.8 sec;   INR: 4.70 ratio         PTT - ( 06 May 2024 08:45 )  PTT:51.6 sec

## 2024-05-07 NOTE — PROGRESS NOTE ADULT - ASSESSMENT
Mr. Burns is a 43 year old male with alcohol use disorder, restless leg syndrome, PVD, anxiety/PTSD [facility] for respiratory failure and ECMO evaluation in the context of liver failure. Patient presented to OSH on 4/25 for chest pain with a hospital course c/b hematemesis, respiratory failure and hepatic dysfunction.     #Multi-organ failure   #Acute on chronic liver failure (ACLF)  #ARDS, on MV  #Circulatory shock, on two vasopressors   #LUBA-D, on CRRT   #Adrenal insufficiency, on Solu-Cortef  #Alcohol-related cirrhosis  #Hematemesis, resolved   #Esophagitis  #Portal hypertensive gastropathy   Patient with jaundice, coagulopathy and encephalopathy i/s/o alcohol use disorder and cirrhosis. Chronicity of underlying liver disease is unclear at this time. Suspect ACLF due to alcoholic hepatitis and underlying cirrhosis. RUQ US with hepatic steatosis and no ascites. CT at [facility] reports nodular liver and splenomegaly, likely cirrhotic. Doppler US with possible PVT, however not noted on CT from [facility].  MELD 3.0 score is 34>>50. Patient underwent EGD at OSH for hematemesis with findings of PHG, normal esophagus and hematin in the gastric fundus without actively bleeding source. s/p bedside endoscopy on 5/6 due to c/f upper GI bleeding with findings of oropharyngeal bleeding, esophagitis, PHG, erosive gastropathy.  Tylenol level and salicylate level were undetectable on 4/27. Acute viral hepatitis panel negative. HBcAb nonreactive. ASMA/AMA negative. UTox negative. HIV/CMV/EBV negative. Ceruloplasmin normal. Ferritin elevation not c/w hemochromatosis. TTE with severely dilated LV, mildly enlarged RV. MDF >80, however not a candidate for prednisone therapy for alcoholic hepatitis due to recent bleeding and critical illness.     Patient remains intubated, ventilated and sedated on multiple vasopressors and ongoing multi-organ failure. s/p proning and initiation of CRRT 4/29.  SUNDAY-C ACLF Score is 75 (calculated 5/3), predicting a 98% probability of death at 1 month. Prognosis remains guarded.     Recommendations:  - Ongoing goals of care discussion  - Hemodynamic and respiratory support per ICU team  - Resume trickle feeds via OG tube   - Continue empiric antimicrobials   - IV PPI BID, monitor for recurrent bleeding  - Maintain active T&S and transfuse blood products as needed  - Bowel regimen   - No anticoagulation for possible PVT noted on doppler as not detected on CT, patient with recent bleed and ongoing coagulopathy  - Trend CBC, CMP, INR, Mg, Phos  - CRRT per Nephrology  - Not a liver transplant candidate due to critical illness, multi-organ failure and guarded prognosis    Hepatology team to sign off. Please call back as needed.     Jurgen Ross MD  Gastroenterology/Hepatology Fellow  Available via [employer] Teams  Pager: [phone]    On Weekdays after 5 PM to 8AM and Weekends/Holidays (All day)  For non-urgent consults, please email [email or web address]  For urgent consults, please contact on call GI team.  See Deborah schedule ([facility]), [employer] ([employer])ing system - [phone] ([facility]), or call hospital  ([facility])

## 2024-05-07 NOTE — PROGRESS NOTE ADULT - ASSESSMENT
43M with hx of alcohol use disorder, restless leg syndrome, PVD, anxiety/PTSD ( service), PAD, taking adderall at home, presented as a transfer from [facility] ED for acute decompensated liver and respiratory failure and possible ECMO eval.    NEURO  # encephalopathy  - Pt encephalopathic on admission likely metabolic vs hepatic encephalopathy (ammonia 94). Intubated for airway protection  - s/p paralytic 5/1  - currently sedated (on , prop, ketamine), fent weaned, now wean propofol  - maintain RAAS -2, wean per ICU protocol;  - started lactulose 5/4    #Seizures   - no documented history of seizures, appeared to be having seizure like activity during rounds 4/27  - s/o veeg negative  - monitor for now    CARDS  # shock  - likely vasoplegia + sepsis (given fevers and leukocytosis), currently on levo and vaso, stable.  - wean vasopressors per ICU protocol  - maintain MAP 65-70  - abx as below  - TTE 4/27 EF 64%, LAE, mild AR, mild MR    #Aortic calcification/?Bicuspid aortic valve  - Pt with aortic calcification on echo w/ possible bicuspid valve. +systolic murmur  - Echo w/o vegetation    PULM  # ARDS  - P/F reported to be 70 at [facility], reflecting severe ARDS. Now improved to 107  -- Peak pressure 36 w/ Plateau 32; PEEP decreased to 10 and subsequently peak pressure 32 and plateau 29; modify vent setting per blood gas  - POCUS w/ RLL consolidation. ARDS likely i/s/o PNA vs pneumonitis from hematemesis  - maintain lung protective ventilation, wean per ICU protocol  - s/p proning with improvement in oxygenation. Pt now w/ improving O2 requirements and plateau pressure  - Airleak overnight 5/3, ETT changed 5/3  - duonebs q6    RENAL  # renal failure, anuria  - sCr on admit 1.86, baseline unclear. Now worsening 2.55. Pt with decreasing UO s/p bumex gtt and metolazone  - likely hepatorenal syndrome, vs ATN from shock  --- s/p albumin x2 days, on octreotide and levophed  - Renal protective measures: Please renally dose all medications; Avoid nephrotoxic medications (NSAIDS, IV contrast); Avoid ACEi and ARBs in the setting of LUBA; Maintain MAP >65;   - Nephro c/s for CVVH; will continue w/ CVVH for now given pressor requirements and poor UO  - monitor strict I&O  - Bladder scan w/ ~400cc, will give FFP and put in a geronimo to prevent persistent trauma and bleeding from straight caths given elevated INR    #hyponatremia  Likely i/s/o hypotonic fluids and d10 gtt; will decrease d10 to 50cc/hr.   - Monitor BMPs    GI  # hematemesis  - EGD at OSH noted blood clots in oropharynx, no obvious bleeding source, normal esophagus  - no epistaxis noted on exam  - 4/28 increased bloody output from OGT, improved  - Hepatology following, no plan for EGD  - protonix 40 mg IVP BID  - New episodes of hematemesis 5/6 s/p EGD without obvious source of bleeding w/ fresh blood in the oropharynx. s/p TXA q8 for 24 hours    # acute liver failure  - likely i/s/o alcohol use; AST / ALT 2:1 ratio. Bilirubin 28   - Last EtOH use: reportedly 4/20/24 per documentation from [facility]  - Smooth muscle ab 1:20   - MELD-Na on admission: 33, trend    - Variceal status: normal esophagus on EGD at OSH  - Per hepatology discontinue NAC  -- If pt improves, may be a candidate for transplant given high MELD per hepatology  - GI following, will appreciate recs  - max tylenol 2000 mg daily    #Bowel regimen  - Pt without BM, s/p relistor x2 i/s/o fent use; now off of fent  - Xray w/ nonobstructive bowel gas pattern  - c/w miralax, senna, lactulose  - s/p enema    ID  # Sepsis/septic shock  - POCUS w/ RLL consolidation, possible cause of infection  - UCx and BCx NGTD,   - s/p vanco and azithro, d/c iso neg legionella and mrsa  - previously on zosyn (4/27 - 5/3); see abx below  - Fungitell 200, can be falsely elevated. Will dc fluconazole given elevated LFTs  Pt n/ new fevers o/n 5/2, broaden to flagyl, cefepime, and vancomycin  - MRSA negative, vancomycin discontinued overnight  - f/u infx work-up; RVP negative  -- UA, blood cx sputum cx    ENDO  # hypocortisoliemia  - 8 AM cortisol 8.7  - solucortef 50mg q6    #Low TSH  - TSH 0.10, T4 WNL    #hypoglycemia  likely i/s/o liver failure and not on tube feeds  - start trickle feeds at 10cc  - D10 at 50cc, increase as tolerated    #hypoglycemia  - likely i/s/o liver failure, AI, and full feeds being held for gut motility issue  - c/w d10; hold tube feeds i/s/o hematemesis    HEME/ONC  # bicytopenia (anemia and thrombocytopenia)  # elevated INR  - likely iso liver failure vs acute bleed at OSH  - s/p 4 u prbc and 2 u FFP at OSH. received 1 u prbc, 1 u plt, and 1 u FFP on 4/27  - shiley placement and exchange of lines  -- s/p additional 3u FFP now, 1u plt during shiley palcement, and 1u pRBC 4/29  - transfuse for hb <7, plt <50 for bleeding  - monitor fibrinogen QD  - Vitamin K x4 (4/28-4/29, 5/5 - 5/6)  - s/p cyro, 3u pRBC and 3u plt, 2u FFP 5/6    #Portal vein thrombosis  - Evidence of portal vein thrombosis on US abdomen  - hold AC at this time iso upper GIB    DERM  # jaundice  - likely iso elevated bilirubin, acute liver failure    #B/l LE edema  - VA duplex negative    DVT: dc heparin given elevated INR and decreasing plt  FENGI: tube feeds  LDA: L IJ and radial a-line and R shiley  GTT: prop,, levo, vaso, ketamine  GOC: Full code for now, family: father Justice and has a brother. Case discussed with father and updates given.

## 2024-05-07 NOTE — PROGRESS NOTE ADULT - SUBJECTIVE AND OBJECTIVE BOX
Carlos Ponce  PGY-3 | Internal Medicine      INTERVAL HPI/OVERNIGHT EVENTS: EGD in the evening without any active bleeding in stomach or esophagus. Fresh blood in oropharynx. s/p additional 1u pRBC, 1u plt, and vitamin Kx1 overnight. Vanc discontinued as MRSA negative    SUBJECTIVE: Patient seen and examined at bedside. Patient is intubated and sedated      OBJECTIVE:    VITAL SIGNS:  ICU Vital Signs Last 24 Hrs  T(C): 37.1 (07 May 2024 04:00), Max: 38.3 (06 May 2024 20:00)  T(F): 98.8 (07 May 2024 04:00), Max: 100.9 (06 May 2024 20:00)  HR: 77 (07 May 2024 07:17) (77 - 90)  BP: --  BP(mean): --  ABP: 140/66 (07 May 2024 06:00) (98/40 - 144/67)  ABP(mean): 91 (07 May 2024 06:00) (58 - 95)  RR: 28 (07 May 2024 06:00) (28 - 28)  SpO2: 96% (07 May 2024 07:17) (94% - 98%)    O2 Parameters below as of 07 May 2024 07:17  Patient On (Oxygen Delivery Method): ventilator          Mode: AC/ CMV (Assist Control/ Continuous Mandatory Ventilation), RR (machine): 28, TV (machine): 430, FiO2: 40, PEEP: 8, ITime: 0.57, MAP: 15, PIP: 29    05-06 @ 07:01  -  05-07 @ 07:00  --------------------------------------------------------  IN: 6425.5 mL / OUT: 7776 mL / NET: -1350.5 mL      CAPILLARY BLOOD GLUCOSE          PHYSICAL EXAM:    GENERAL: intubated, sedated  HENMT: Atraumatic, moist mucous membranes, no oropharyngeal exudates or vesicles, uvula is midline EYES: scleral icterus, PERRL,  HEART: RRR, S1/S2, no murmur/gallops/rubs  RESPIRATORY: Clear to auscultation bilaterally, no wheezes/rhonchi/rales  ABDOMEN: soft, nontender, protuberant  EXTREMITIES: 1+ lower extremity edema, +2 radial pulses b/l  NEURO:  intubated, sedated   Heme/LYMPH: ecchymosis on RLE/calf area  SKIN: jaundiced    MEDICATIONS:  MEDICATIONS  (STANDING):  albuterol/ipratropium for Nebulization 3 milliLiter(s) Nebulizer every 6 hours  cefepime   IVPB 2000 milliGRAM(s) IV Intermittent every 8 hours  chlorhexidine 0.12% Liquid 15 milliLiter(s) Oral Mucosa every 12 hours  chlorhexidine 2% Cloths 1 Application(s) Topical daily  CRRT Treatment    <Continuous>  dexMEDEtomidine Infusion 0.3 MICROgram(s)/kG/Hr (6.91 mL/Hr) IV Continuous <Continuous>  dextrose 10%. 1000 milliLiter(s) (50 mL/Hr) IV Continuous <Continuous>  folic acid 1 milliGRAM(s) Oral daily  hydrocortisone sodium succinate Injectable 50 milliGRAM(s) IV Push every 6 hours  ketamine Infusion. 1.5 mG/kG/Hr (13.8 mL/Hr) IV Continuous <Continuous>  lactulose Syrup 30 Gram(s) Oral three times a day  metroNIDAZOLE  IVPB 500 milliGRAM(s) IV Intermittent every 12 hours  multivitamin/minerals/iron Oral Solution (CENTRUM) 15 milliLiter(s) Oral daily  norepinephrine Infusion 0.05 MICROgram(s)/kG/Min (4.32 mL/Hr) IV Continuous <Continuous>  pantoprazole Infusion 8 mG/Hr (10 mL/Hr) IV Continuous <Continuous>  petrolatum Ophthalmic Ointment 1 Application(s) Both EYES two times a day  polyethylene glycol 3350 17 Gram(s) Oral two times a day  PrismaSATE Dialysate BGK 4 / 2.5 5000 milliLiter(s) (2500 mL/Hr) CRRT <Continuous>  PrismaSOL Filtration BGK 4 / 2.5 5000 milliLiter(s) (200 mL/Hr) CRRT <Continuous>  PrismaSOL Filtration BGK 4 / 2.5 5000 milliLiter(s) (2000 mL/Hr) CRRT <Continuous>  propofol Infusion 49.946 MICROgram(s)/kG/Min (27.6 mL/Hr) IV Continuous <Continuous>  senna 2 Tablet(s) Oral at bedtime  thiamine 100 milliGRAM(s) Oral daily  tranexamic acid Injectable for Nebulization 500 milliGRAM(s) Inhalation every 8 hours  vasopressin Infusion 0.04 Unit(s)/Min (6 mL/Hr) IV Continuous <Continuous>    MEDICATIONS  (PRN):      ALLERGIES:  Allergies    Allergy Status Unknown    Intolerances        LABS:                        7.3    13.24 )-----------( 44       ( 07 May 2024 02:00 )             20.8     05-07    131<L>  |  97<L>  |  20  ----------------------------<  165<H>  4.4   |  18<L>  |  0.78    Ca    8.2<L>      07 May 2024 02:00  Phos  2.9     05-07  Mg     2.30     05-07    TPro  6.7  /  Alb  3.3  /  TBili  31.8<H>  /  DBili  x   /  AST  206<H>  /  ALT  102<H>  /  AlkPhos  76  05-07    PT/INR - ( 07 May 2024 02:00 )   PT: 36.2 sec;   INR: 3.35 ratio         PTT - ( 07 May 2024 02:00 )  PTT:40.6 sec  Urinalysis Basic - ( 07 May 2024 02:00 )    Color: x / Appearance: x / SG: x / pH: x  Gluc: 165 mg/dL / Ketone: x  / Bili: x / Urobili: x   Blood: x / Protein: x / Nitrite: x   Leuk Esterase: x / RBC: x / WBC x   Sq Epi: x / Non Sq Epi: x / Bacteria: x        RADIOLOGY & ADDITIONAL TESTS: Reviewed.

## 2024-05-07 NOTE — CONSULT NOTE ADULT - PROBLEM SELECTOR RECOMMENDATION 9
1. Appears blood is oozing intermittently at gum line. No ENT intervention. Patient with GIB and low platelets.. Treat bleeding as per MICU. Consider Platelet transfusion and transfuse PRBCS prn. Can also consider nebulized TXA via face tent if oral bleeding starts again.

## 2024-05-07 NOTE — CHART NOTE - NSCHARTNOTEFT_GEN_A_CORE
NUTRITION FOLLOW-UP      44 y/o presenting to [facility] (4/25) intubated/sedated for airway protection and with incidental findings of GI bleed. [facility] in setting of acute hypoxic respiratory failure 2/2 PNA with ARDS.  Pt. with LUBA and continues on CVVHD. Remains jaundiced.  ARDS improving, although is noted in multiorgan system failure.  Also with oral bleeding.      Per hepatology documentation (5/7), can re-initiate trickle feeds.  Currently with inadequate energy/nutrient intake x at least 4d. Pt. changed to NPO due to hematemesis (5/6).  Prior to NPO was receiving enteral nutrition with VitalHP @ 10mL/hr (5/3-5/6) and before that @ previously recommended goal of 60mL/hr (4/30-5/3) x 18hrs.    Pt. remains intubated.  Jaundice & edematous appearance.  Spoke with MICU resident physician as well as RN & discussed nutrition recommendations.  Propofol being weaned.  OGT placed with ~ 200mL output in past 24hrs.   Bowel movement (5/7).  No abdominal distention.     If/when medically appropriate would re-start tube feeding with formula Peptamen 1.5 and also add Liquid Protein Supplement (LPS) to help meet increased protein requirements.          _________________Diet___________________  Diet, NPO (05-06-24 @ 13:17) [Active]      Weight:                    Height:  67" / 170.18cm               Ideal Body Weight: 148lbs / 67.1kg  91.1kg (5/7)  90.7kg (5/1)  100.8kg (4/28)  92.1kg (4/27 on admit)    _________Previously Estimated Energy/Protein Needs (based on IBW)__________  22-25 KCals/kg = 2727-8523 KCals/d  1.6-1.8g proteing/kg= 108-121g protein/d      Edema: 2+ right arm, 4+ L arm, 3+ b/l legs  Skin: No pressure injuries        ________________________Pertinent Medications____________   MEDICATIONS  (STANDING):  albuterol/ipratropium for Nebulization 3 milliLiter(s) Nebulizer every 6 hours  cefepime   IVPB 2000 milliGRAM(s) IV Intermittent every 8 hours  chlorhexidine 0.12% Liquid 15 milliLiter(s) Oral Mucosa every 12 hours  chlorhexidine 2% Cloths 1 Application(s) Topical daily  CRRT Treatment    <Continuous>  dexMEDEtomidine Infusion 0.3 MICROgram(s)/kG/Hr (6.91 mL/Hr) IV Continuous <Continuous>  dextrose 10%. 1000 milliLiter(s) (50 mL/Hr) IV Continuous <Continuous>  folic acid 1 milliGRAM(s) Oral daily  hydrocortisone sodium succinate Injectable 50 milliGRAM(s) IV Push every 6 hours  ketamine Infusion. 1.5 mG/kG/Hr (13.8 mL/Hr) IV Continuous <Continuous>  lactulose Syrup 30 Gram(s) Oral three times a day  metoclopramide Injectable 5 milliGRAM(s) IV Push every 8 hours  metroNIDAZOLE  IVPB 500 milliGRAM(s) IV Intermittent every 12 hours  multivitamin/minerals/iron Oral Solution (CENTRUM) 15 milliLiter(s) Oral daily  norepinephrine Infusion 0.05 MICROgram(s)/kG/Min (4.32 mL/Hr) IV Continuous <Continuous>  pantoprazole Infusion 8 mG/Hr (10 mL/Hr) IV Continuous <Continuous>  petrolatum Ophthalmic Ointment 1 Application(s) Both EYES two times a day  polyethylene glycol 3350 17 Gram(s) Oral two times a day  PrismaSATE Dialysate BGK 4 / 2.5 5000 milliLiter(s) (2500 mL/Hr) CRRT <Continuous>  PrismaSOL Filtration BGK 4 / 2.5 5000 milliLiter(s) (2000 mL/Hr) CRRT <Continuous>  PrismaSOL Filtration BGK 4 / 2.5 5000 milliLiter(s) (200 mL/Hr) CRRT <Continuous>  propofol Infusion 49.946 MICROgram(s)/kG/Min (27.6 mL/Hr) IV Continuous <Continuous>  senna 2 Tablet(s) Oral at bedtime  thiamine 100 milliGRAM(s) Oral daily  vasopressin Infusion 0.04 Unit(s)/Min (6 mL/Hr) IV Continuous <Continuous>    MEDICATIONS  (PRN):          _________________________Pertinent Labs____________________     05-07    133<L>  |  98  |  19  ----------------------------<  189<H>  4.3   |  20<L>  |  0.74    Ca    8.1<L>      07 May 2024 08:15  Phos  2.6     05-07  Mg     2.40     05-07    TPro  6.8  /  Alb  3.5  /  TBili  33.8<H>  /  DBili  x   /  AST  203<H>  /  ALT  101<H>  /  AlkPhos  67  05-07                                                                   7.9    13.53 )-----------( 46       ( 07 May 2024 08:15 )             21.7       Triglycerides, Serum: 102 mg/dL (05.06.24 @ 12:14)    ________NUTRITION Dx_________    Pt. remains severely malnourished       PLAN/RECOMMENDATIONS:    1) If/when appropriate to resume enteral feedings, start Peptamen 1.5 @ 10mL/hr then increase by 10mL q4hrs, as tolerated, to goal of 60mL/hr x 18hrs + Liquid Protein Supplement (LPS) 3 packets per day    will provide:  1080mL total volume  1620 KCals  73g protein (+ 45g additional protein via LPS)= 118g total protein   829mL free water    2) Obtain daily weights  3) Monitor tolerance to TF, GI status, electrolytes, glucose     RDN remains available and will f/u PRN.          Kadi Giordano, VICTOR HUGO, CDN       pager [phone] or MS Teams

## 2024-05-07 NOTE — PROGRESS NOTE ADULT - ASSESSMENT
DAILY PROGRESS NOTE    Patient: Arin VAIL July Attending: Damian Garner MD   : 1982 Date: 3/18/2020 9:59 AM   AGE: 38 year old Current Hospital Day: Hospital Day: 4   SEX:  female          Chief Complaint: Accidental heroin overdose        Summary :  Patient is a 38 year old female with polysubstance abuse presented with heroin overdose on 3/15/20. There was significant abscess appearance of her left wrist region on admission. Concern for cellulitis/abscess. She was started on broad spectrum antibiotics. Orthopedics consulted.     Subjective/History of Present Illness:     S/p I&D of left wrist abscess yesterday. No events overnight. Pain is controlled well. Sleepy this morning. Denies chest pain, sob, n/v, abdominal pain.     Review of systems  -no chest pain  -no shortness of breath  -no nausea    Objective:       Vitals:   Vitals:    20 0715   BP: 90/54   Pulse: 60   Resp: 16   Temp: 97.1 °F (36.2 °C)         PHYSICAL EXAM:  General:  Alert, cooperative, conversive.  Respiratory:   Bilaterally clear to auscultation   Cardiovascular:  Regular rate and rhythm and S1, S2 present  Abdomen:  Abdomen: soft, non-tender, non-distended. Positive bowel sounds all quadrants. No guarding or rebound.  Extremities:   No edema.  No deformity.  No tenderness. Left forearms and wrist bandaged. Able to move fingers. Neurovascular intact   Neuro:   Alert, oriented x4.  Speech intact.  No dysphasia or dysarthria.  Symmetrical facial structures.     Medications: Medications reviewed today - see Assessment and Plan for changes    Laboratory/Culture/Imaging Results:     All pertinent laboratory results were reviewed.    Reviewed today - see Assessment and Plan for changes     Assessment and Plan:    Patient is a 38 year old female with polysubstance abuse presented with heroin overdose on 3/15/20. There was significant abscess appearance of her left wrist region on admission    Left wrist abscess s/p I&D 3/17/20   -  MRI no osteomyelitis  - orthopedics on board   - BC x 2 with no growth to date  - cultures - no organism seen so far  - continue vancomycin. F/u on I&D cultures     Heroin overdose - accidental. Pt now awake, alert and oriented x 3.   - COWS protocol in place.   - Psychiatry consulted. Started suboxone, gabapentin, prozac , seroquel.     Anemia  -iron panel  -PPI    Polysubstance abuse - Urine drug screen positive for amphetamines, cocaine, opiates   - Psych on board  -  Referral to [facility] per psych    Pregnancy test negative     Quality Indicators     AMI With Heart Failure with Reduced LVEF (<40%)? no  Heart failure?  No  Stroke measures indicated? no  AMI? No  DVT/VTE Prophylaxis:  VTE Pharmacologic Prophylaxis: Yes  VTE Mechanical Prophylaxis: Yes  Severe sepsis with septic shock?  No    Tentative Discharge/Disposition:  Pending OR pathology. Likely tomorrow     Case Discussed With:  Patient and MD        Attestation Statement:  Patient examined and discussed with MD Mala Mast MD  Internal Medicine PGY-3      03/18/20 Patient interviewed and examined.     CVS. S1 and S2 present. Regular. Extremities, no edema.  Lungs. Clear. Easy respirations.  Abdomen. Soft.    Agree with the note above, including assessment and plan, which was revised by me.     Damian Garner MD     [phone]       Hospitalist                                                           Pt. with oliguric LUBA, on CRRT.

## 2024-05-07 NOTE — PROGRESS NOTE ADULT - ATTENDING COMMENTS
remains critically ill with MOSF.  new bleeding GI versus Pharyngeal   oliguric ATN   continue with CRRT  Assessment and plan as outlined above. Monitor labs and urine output. Avoid any potential nephrotoxins. Dose medications as per eGFR.

## 2024-05-07 NOTE — CHART NOTE - NSCHARTNOTEFT_GEN_A_CORE
: Suha Martinez PA-C     INDICATION: Acute hypoxemic respiratory failure     PROCEDURE:  [X] LIMITED ECHO  [X] LIMITED CHEST  [ ] LIMITED RETROPERITONEAL  [ ] LIMITED ABDOMINAL  [ ] LIMITED DVT  [ ] NEEDLE GUIDANCE VASCULAR  [ ] NEEDLE GUIDANCE THORACENTESIS  [ ] NEEDLE GUIDANCE PARACENTESIS  [ ] NEEDLE GUIDANCE PERICARDIOCENTESIS  [ ] OTHER    FINDINGS:    Grossly normal LV systolic function. Mildly reduced RV systolic function and mildly dilated approaching LV size. IVC 2.3 cm without respiratory variation. A-line predominate with scattered B-lines to bilateral anterior chest wall. LVOT VTI 20 cm.    INTERPRETATION:    Grossly normal cardiac systolic function with abnormal aeration pattern to lungs bilaterally. : Suha Martinez PA-C     INDICATION: Acute hypoxemic respiratory failure     PROCEDURE:  [X] LIMITED ECHO  [X] LIMITED CHEST  [ ] LIMITED RETROPERITONEAL  [ ] LIMITED ABDOMINAL  [ ] LIMITED DVT  [ ] NEEDLE GUIDANCE VASCULAR  [ ] NEEDLE GUIDANCE THORACENTESIS  [ ] NEEDLE GUIDANCE PARACENTESIS  [ ] NEEDLE GUIDANCE PERICARDIOCENTESIS  [ ] OTHER    FINDINGS:    Grossly normal LV systolic function. Mildly reduced RV systolic function and mildly dilated approaching LV size. IVC 2.3 cm without respiratory variation. A-line predominate with scattered B-lines to bilateral anterior chest wall. LVOT VTI 20 cm.    INTERPRETATION:    Grossly normal cardiac systolic function with abnormal aeration pattern to lungs bilaterally.    Attending Attestation:  I was present during the key portions of the procedure and immediately available during the entire procedure.  Domenico Krueger MD  Attending  Pulmonary & Critical Care Medicine

## 2024-05-07 NOTE — CONSULT NOTE ADULT - SUBJECTIVE AND OBJECTIVE BOX
Chief Complaint:  ARDS    HPI:    Mr. Burns is a 43 year old male with alcohol use disorder, restless leg syndrome, PVD, anxiety/PTSD who arrived to [facility] as a transfer from [facility] for respiratory failure and ECMO evaluation in the context of liver failure.     Patient initially presented to [facility] for chest pain on 4/25. He also fell on the day of presentation, but was unable to provide specific details on how he fell. Jaundice was noticed by an acquaintance recently, however history of liver disease is unknown. Patient was found to have Hb 4.9 g/dL, s/p 4 units pRBC and 2 u FFP. He was initially placed on HFNC with worsening hypoxemia and plan for intubation. However, he had an episode of hematemesis and was immediately intubated for airway protection. Noted to have active bleeding during intubation. GI performed an EGD with moderate amount of blood clots in oropharynx, normal esophagus, portal hypertensive gastropathy and hematin in gastric fundus. Respiratory evaluation was concerning for ARDS.  per patient's father, patient was suffering from worsening leg pain from restless leg and worsening SYDNI. Was taking tylenol, unsure how much.    Allergies:  Allergy Status Unknown    Hospital Medications:  albumin human 25% IVPB 100 milliLiter(s) IV Intermittent every 6 hours  albuterol/ipratropium for Nebulization 3 milliLiter(s) Nebulizer every 6 hours  albuterol/ipratropium for Nebulization. 3 milliLiter(s) Nebulizer once  chlorhexidine 0.12% Liquid 15 milliLiter(s) Oral Mucosa every 12 hours  cisatracurium Infusion 1.001 MICROgram(s)/kG/Min IV Continuous <Continuous>  fentaNYL   Infusion. 3 MICROgram(s)/kG/Hr IV Continuous <Continuous>  norepinephrine Infusion 0.14 MICROgram(s)/kG/Min IV Continuous <Continuous>  pantoprazole  Injectable 40 milliGRAM(s) IV Push two times a day  piperacillin/tazobactam IVPB.. 3.375 Gram(s) IV Intermittent every 12 hours  potassium chloride  10 mEq/100 mL IVPB 10 milliEquivalent(s) IV Intermittent every 1 hour  propofol Infusion 49.946 MICROgram(s)/kG/Min IV Continuous <Continuous>  vasopressin Infusion 0.04 Unit(s)/Min IV Continuous <Continuous>    PMHX/PSHX:  Alcohol abuse    Anxiety    Anxiety and depression    History of restless legs syndrome    Family history:  FH: type 2 diabetes (Father, Grandparent)    FH: lung cancer (Mother)    Social History:   Positive alcohol    ROS:   See HPI    PHYSICAL EXAM:   GENERAL:  Intubated, sedated  HEENT:  Sclera icteric  CHEST:  MV via ETT  HEART:  on vasopressors  ABDOMEN:  Soft, distended  EXTREMITIES:  1-2+ pitting edema b/L LE. Ecchymosis noted on RLE.   SKIN:  Warm & Dry. Jaundiced.   NEURO:  sedated    Vital Signs:  Vital Signs Last 24 Hrs  T(C): 36.8 (27 Apr 2024 08:00), Max: 37.2 (27 Apr 2024 04:00)  T(F): 98.3 (27 Apr 2024 08:00), Max: 98.9 (27 Apr 2024 04:00)  HR: 98 (27 Apr 2024 11:00) (85 - 98)  BP: 150/60 (27 Apr 2024 00:00) (150/60 - 150/60)  BP(mean): --  RR: 24 (27 Apr 2024 11:00) (21 - 24)  SpO2: 97% (27 Apr 2024 11:00) (96% - 100%)    Parameters below as of 27 Apr 2024 11:00  Patient On (Oxygen Delivery Method): ventilator    O2 Concentration (%): 80  Daily Height in cm: 170.18 (27 Apr 2024 00:00)    Daily     LABS:                        6.8    6.91  )-----------( 70       ( 27 Apr 2024 10:30 )             21.2     Mean Cell Volume: 96.8 fL (04-27-24 @ 10:30)    04-27    137  |  103  |  54<H>  ----------------------------<  133<H>  3.8   |  21<L>  |  1.86<H>    Ca    8.4      27 Apr 2024 10:30  Phos  5.1     04-27  Mg     1.40     04-27    TPro  6.4  /  Alb  2.9<L>  /  TBili  15.4<H>  /  DBili  x   /  AST  133<H>  /  ALT  35  /  AlkPhos  41  04-27    LIVER FUNCTIONS - ( 27 Apr 2024 10:30 )  Alb: 2.9 g/dL / Pro: 6.4 g/dL / ALK PHOS: 41 U/L / ALT: 35 U/L / AST: 133 U/L / GGT: x           PT/INR - ( 27 Apr 2024 06:45 )   PT: 27.0 sec;   INR: 2.48 ratio         PTT - ( 27 Apr 2024 06:45 )  PTT:31.5 sec  Urinalysis Basic - ( 27 Apr 2024 10:30 )    Color: x / Appearance: x / SG: x / pH: x  Gluc: 133 mg/dL / Ketone: x  / Bili: x / Urobili: x   Blood: x / Protein: x / Nitrite: x   Leuk Esterase: x / RBC: x / WBC x   Sq Epi: x / Non Sq Epi: x / Bacteria: x      Amylase Serum--      Lipase serum--       [id number]                          6.8    6.91  )-----------( 70       ( 27 Apr 2024 10:30 )             21.2                         7.2    7.13  )-----------( 67       ( 27 Apr 2024 06:45 )             21.9                         6.8    8.40  )-----------( 71       ( 27 Apr 2024 00:30 )             21.0     Imaging:    no abdominal imaging available  
[facility] DIVISION OF KIDNEY DISEASES AND HYPERTENSION -- [phone]  -- INITIAL CONSULT NOTE  --------------------------------------------------------------------------------  HPI: 43M with hx of alcohol use disorder and PAD, presented as a transfer from [facility] for acute decompensated liver and respiratory failure and possible ECMO eval. Nephrology service consulted for LUBA and volume overload.    Per chart review, patient intubated on arrival. He initially presented on 4/25 for chest pain and he also had a fall that day. Found to have Hb 4.9, received 4 units pRBC and 2 u FFP. Also thrombocytopenic with Plt<50 and dropping. Noted to have active bleeding during intubation. GI performed EGD with moderate amount of blood clots in oropharynx, no obvious bleeding source, normal esophagus, portal hypertensive gastropathy, hematin in gastric fundus. On laboratory evaluation, patient noted to have p/f 70 and high peak pressures with c/f ARDS. He is on Levo, Vaso, Octreotide, and Bumex gtts. Remains non-oliguric, but BUN/Cr continues to rise from 46/1.86 on admission to 70/2.55 on 4/29. Also acidotic with pH 7.26.        PAST HISTORY  --------------------------------------------------------------------------------  PAST MEDICAL & SURGICAL HISTORY:  Alcohol abuse  Anxiety and depression  History of restless legs syndrome        FAMILY HISTORY:  FH: type 2 diabetes (Father, Grandparent)    FH: lung cancer (Mother)      PAST SOCIAL HISTORY:    ALLERGIES & MEDICATIONS  --------------------------------------------------------------------------------  Allergies    Allergy Status Unknown      Standing Inpatient Medications  albuterol/ipratropium for Nebulization 3 milliLiter(s) Nebulizer every 6 hours  albuterol/ipratropium for Nebulization. 3 milliLiter(s) Nebulizer once  buMETAnide Infusion 2.5 mG/Hr IV Continuous <Continuous>  chlorhexidine 0.12% Liquid 15 milliLiter(s) Oral Mucosa every 12 hours  chlorhexidine 2% Cloths 1 Application(s) Topical daily  cisatracurium Infusion 2 MICROgram(s)/kG/Min IV Continuous <Continuous>  fentaNYL   Infusion..... 3 MICROgram(s)/kG/Hr IV Continuous <Continuous>  ketamine Infusion. 0.5 mG/kG/Hr IV Continuous <Continuous>  mupirocin 2% Ointment 1 Application(s) Both Nostrils every 12 hours  norepinephrine Infusion 0.14 MICROgram(s)/kG/Min IV Continuous <Continuous>  octreotide  Infusion 50 MICROgram(s)/Hr IV Continuous <Continuous>  pantoprazole  Injectable 40 milliGRAM(s) IV Push two times a day  petrolatum Ophthalmic Ointment 1 Application(s) Both EYES two times a day  piperacillin/tazobactam IVPB.. 3.375 Gram(s) IV Intermittent every 12 hours  propofol Infusion 49.946 MICROgram(s)/kG/Min IV Continuous <Continuous>  vasopressin Infusion 0.04 Unit(s)/Min IV Continuous <Continuous>    PRN Inpatient Medications      REVIEW OF SYSTEMS  --------------------------------------------------------------------------------  ROS unable to obtain due to clinical condition    VITALS  --------------------------------------------------------------------------------  T(C): 39.1 (04-29-24 @ 08:00), Max: 39.1 (04-29-24 @ 08:00)  HR: 101 (04-29-24 @ 11:00) (97 - 107)  BP: --  RR: 28 (04-29-24 @ 11:00) (28 - 28)  SpO2: 97% (04-29-24 @ 11:00) (89% - 99%)  Wt(kg): --      04-28-24 @ 07:01  -  04-29-24 @ 07:00  --------------------------------------------------------  IN: 4042.1 mL / OUT: 2090 mL / NET: 1952.1 mL    04-29-24 @ 07:01  -  04-29-24 @ 11:36  --------------------------------------------------------  IN: 618.8 mL / OUT: 85 mL / NET: 533.8 mL      PHYSICAL EXAM:  General: critically ill, intubated and on pressors  Neuro: sedated  HEENT: +ETT, +NGT  Pulmonary: vented  Cardiovascular/Chest: +S1S2  GI/Abdomen: soft,+bowel sounds  : geronimo  Extremities: ++LE edema  Skin: Warm and dry    LABS/STUDIES  --------------------------------------------------------------------------------              7.1    8.25  >-----------<  39       [04-29-24 @ 08:15]              21.2     132  |  95  |  70  ----------------------------<  97      [04-29-24 @ 08:15]  4.1   |  17  |  2.55        Ca     8.5     [04-29-24 @ 08:15]      iCa    1.19     [04-29 @ 02:17]      Mg     2.00     [04-29-24 @ 08:15]      Phos  4.9     [04-29-24 @ 08:15]    TPro  6.4  /  Alb  3.7  /  TBili  21.0  /  DBili  x   /  AST  130  /  ALT  39  /  AlkPhos  28  [04-29-24 @ 08:15]    PT/INR: PT 38.1 , INR 3.53       [04-29-24 @ 02:17]  PTT: 39.3       [04-29-24 @ 02:17]      Creatinine Trend:  SCr 2.55 [04-29 @ 08:15]  SCr 2.25 [04-29 @ 02:17]  SCr 2.06 [04-28 @ 19:45]  SCr 2.01 [04-28 @ 14:00]  SCr 2.01 [04-28 @ 07:25]    Urinalysis - [04-29-24 @ 08:15]      Color  / Appearance  / SG  / pH       Gluc 97 / Ketone   / Bili  / Urobili        Blood  / Protein  / Leuk Est  / Nitrite       RBC  / WBC  / Hyaline  / Gran  / Sq Epi  / Non Sq Epi  / Bacteria     Urine Creatinine 213      [04-27-24 @ 10:30]  Urine Protein 64      [04-27-24 @ 10:30]  Urine Sodium <20      [04-27-24 @ 10:30]  Urine Urea Nitrogen 660.2      [04-27-24 @ 10:30]  Urine Potassium 50.1      [04-27-24 @ 10:30]  Urine Osmolality 564      [04-27-24 @ 10:39]    HBsAb 19.6      [04-27-24 @ 15:50]  HBsAg Nonreact      [04-27-24 @ 15:50]  HBcAb Nonreact      [04-27-24 @ 15:50]  HCV 0.24, Nonreact      [04-27-24 @ 05:00]  HIV Nonreact      [04-27-24 @ 18:56]    dsDNA 1      [04-27-24 @ 22:23]  C3 Complement 66      [04-27-24 @ 22:23]  C4 Complement 9      [04-29-24 @ 02:17]  Syphilis Screen (Treponema Pallidum Ab) Negative      [04-27-24 @ 15:50]
CC: oral bleeding     HPI: 43 year old male with alcoholic cirrhosis that is admitted with Respiratory distress, liver failure transferred drom OSH for possible ECMO. Now with GIB, NGT in place with dark blood. ENT called for evall of oral bleeding.        PAST MEDICAL & SURGICAL HISTORY:  Alcohol abuse      Anxiety and depression      History of restless legs syndrome        Allergies    Allergy Status Unknown    Intolerances      MEDICATIONS  (STANDING):  albuterol/ipratropium for Nebulization 3 milliLiter(s) Nebulizer every 6 hours  cefepime   IVPB 2000 milliGRAM(s) IV Intermittent every 8 hours  chlorhexidine 0.12% Liquid 15 milliLiter(s) Oral Mucosa every 12 hours  chlorhexidine 2% Cloths 1 Application(s) Topical daily  CRRT Treatment    <Continuous>  dexMEDEtomidine Infusion 0.3 MICROgram(s)/kG/Hr (6.91 mL/Hr) IV Continuous <Continuous>  dextrose 10%. 1000 milliLiter(s) (50 mL/Hr) IV Continuous <Continuous>  folic acid 1 milliGRAM(s) Oral daily  hydrocortisone sodium succinate Injectable 50 milliGRAM(s) IV Push every 6 hours  ketamine Infusion. 1.5 mG/kG/Hr (13.8 mL/Hr) IV Continuous <Continuous>  lactulose Syrup 30 Gram(s) Oral three times a day  metoclopramide Injectable 5 milliGRAM(s) IV Push every 8 hours  metroNIDAZOLE  IVPB 500 milliGRAM(s) IV Intermittent every 12 hours  multivitamin/minerals/iron Oral Solution (CENTRUM) 15 milliLiter(s) Oral daily  norepinephrine Infusion 0.05 MICROgram(s)/kG/Min (4.32 mL/Hr) IV Continuous <Continuous>  pantoprazole Infusion 8 mG/Hr (10 mL/Hr) IV Continuous <Continuous>  petrolatum Ophthalmic Ointment 1 Application(s) Both EYES two times a day  polyethylene glycol 3350 17 Gram(s) Oral two times a day  PrismaSATE Dialysate BGK 4 / 2.5 5000 milliLiter(s) (2500 mL/Hr) CRRT <Continuous>  PrismaSOL Filtration BGK 4 / 2.5 5000 milliLiter(s) (200 mL/Hr) CRRT <Continuous>  PrismaSOL Filtration BGK 4 / 2.5 5000 milliLiter(s) (2000 mL/Hr) CRRT <Continuous>  propofol Infusion 49.946 MICROgram(s)/kG/Min (27.6 mL/Hr) IV Continuous <Continuous>  senna 2 Tablet(s) Oral at bedtime  thiamine 100 milliGRAM(s) Oral daily  vasopressin Infusion 0.04 Unit(s)/Min (6 mL/Hr) IV Continuous <Continuous>    MEDICATIONS  (PRN):      ROS:   ENT: all negative except as noted in HPI   CV: denies palpitations  Pulm: denies SOB, cough, hemoptysis  GI: denies change in apetite, indigestion, n/v  : denies pertinent urinary symptoms, urgency  Neuro: denies numbness/tingling, loss of sensation  Psych: denies anxiety  MS: denies muscle weakness, instability  Heme: denies easy bruising or bleeding  Endo: denies heat/cold intolerance, excessive sweating  Vascular: denies LE edema    Vital Signs Last 24 Hrs  T(C): 37.7 (07 May 2024 12:00), Max: 38.3 (06 May 2024 20:00)  T(F): 99.9 (07 May 2024 12:00), Max: 100.9 (06 May 2024 20:00)  HR: 79 (07 May 2024 12:00) (77 - 89)  BP: --  BP(mean): --  RR: 22 (07 May 2024 12:00) (22 - 28)  SpO2: 96% (07 May 2024 12:00) (94% - 98%)    Parameters below as of 07 May 2024 12:00  Patient On (Oxygen Delivery Method): ventilator, VAC---22/430/5    O2 Concentration (%): 40                          7.9    13.53 )-----------( 46       ( 07 May 2024 08:15 )             21.7    05-07    133<L>  |  98  |  19  ----------------------------<  189<H>  4.3   |  20<L>  |  0.74    Ca    8.1<L>      07 May 2024 08:15  Phos  2.6     05-07  Mg     2.40     05-07    TPro  6.8  /  Alb  3.5  /  TBili  33.8<H>  /  DBili  x   /  AST  203<H>  /  ALT  101<H>  /  AlkPhos  67  05-07   PT/INR - ( 07 May 2024 08:15 )   PT: 36.9 sec;   INR: 3.41 ratio         PTT - ( 07 May 2024 08:15 )  PTT:41.1 sec    PHYSICAL EXAM:  Gen: NAD  Skin: No rashes, bruises, or lesions  Head: Normocephalic, Atraumatic  Face: no edema, erythema, or fluctuance. Parotid glands soft without mass  Eyes: no scleral injection  Ears: Right - ear canal clear, TM intact without effusion or erythema. No evidence of any fluid drainage. No mastoid tenderness, erythema, or ear bulging            Left - ear canal clear, TM intact without effusion or erythema. No evidence of any fluid drainage. No mastoid tenderness, erythema, or ear bulging  Nose: Nares bilaterally with no active bleeding appreciated..   Mouth: intubated. Dried crusting of blood in oral cavity. No active bleeding noted. No lacerations appreciated.   Neck: Flat, supple, no lymphadenopathy, trachea midline, no masses  Lymphatic: No lymphadenopathy  Resp: breathing easily, no stridor  CV: no peripheral edema/cyanosis  GI: nondistended   Peripheral vascular: no JVD or edema  Neuro: facial nerve intact, no facial droop      IMAGING/ADDITIONAL STUDIES:

## 2024-05-07 NOTE — PROGRESS NOTE ADULT - ATTENDING COMMENTS
43 M with etoh use d/o, PTSD went to [place] for GI bleed c/w acute decompensated liver failure and acute hypoxemic respiratory failure with acute UGIB requiring intubation, tx here, course c/b LUBA requiring dialysis, shock    Patient still with liver fialure  not able to meaningfully wake up  still requiring HD  concern for adrenal insufficiency  still with acute metabolic encephalopathy due to hyperammonemia, refractory to CRRT  having bloody output from OG tube. EGD yest with esophagitis, gastropathy, possible component of oropharyngeal bleeding    # acute hypoxemic respiratory failure  # acute metabolic encephalopathy  # hepatic encephalopathy  # LUBA  # acute UGIB  - c/w full vent support, adjust based on blood gases  - wean sedation as tolerated  - c/w CRRT for fluid removal and for hyperammonemia, LUBA. Tolerating -100cc/hr  - c/w broad abx, f/u sputum culture  - c/w vasopressors, on Levo/Vaso  - c/w steroids for AI  - c/w PPI  - ENT eval for possible oropharyngeal bleeding  - LUE duplex for LUE swelling  - Monitor CBC q6h  - Reglan for ileus if QTC acceptable    full code per dad and patient's brother, ongoing C

## 2024-05-07 NOTE — PROGRESS NOTE ADULT - SUBJECTIVE AND OBJECTIVE BOX
Interval Events:   s/p bedside EGD with active oropharyngeal bleeding; minimal oozing in the stomach related to gastropathy  (see report for full details)  No recurrent bleeding noted   Patient remains in multi-organ failure     Hospital Medications:  albuterol/ipratropium for Nebulization 3 milliLiter(s) Nebulizer every 6 hours  cefepime   IVPB 2000 milliGRAM(s) IV Intermittent every 8 hours  chlorhexidine 0.12% Liquid 15 milliLiter(s) Oral Mucosa every 12 hours  chlorhexidine 2% Cloths 1 Application(s) Topical daily  CRRT Treatment    <Continuous>  dexMEDEtomidine Infusion 0.3 MICROgram(s)/kG/Hr IV Continuous <Continuous>  dextrose 10%. 1000 milliLiter(s) IV Continuous <Continuous>  folic acid 1 milliGRAM(s) Oral daily  hydrocortisone sodium succinate Injectable 50 milliGRAM(s) IV Push every 6 hours  ketamine Infusion. 1.5 mG/kG/Hr IV Continuous <Continuous>  lactulose Syrup 30 Gram(s) Oral three times a day  metroNIDAZOLE  IVPB 500 milliGRAM(s) IV Intermittent every 12 hours  multivitamin/minerals/iron Oral Solution (CENTRUM) 15 milliLiter(s) Oral daily  norepinephrine Infusion 0.05 MICROgram(s)/kG/Min IV Continuous <Continuous>  pantoprazole Infusion 8 mG/Hr IV Continuous <Continuous>  petrolatum Ophthalmic Ointment 1 Application(s) Both EYES two times a day  polyethylene glycol 3350 17 Gram(s) Oral two times a day  PrismaSATE Dialysate BGK 4 / 2.5 5000 milliLiter(s) CRRT <Continuous>  PrismaSOL Filtration BGK 4 / 2.5 5000 milliLiter(s) CRRT <Continuous>  PrismaSOL Filtration BGK 4 / 2.5 5000 milliLiter(s) CRRT <Continuous>  propofol Infusion 49.946 MICROgram(s)/kG/Min IV Continuous <Continuous>  senna 2 Tablet(s) Oral at bedtime  thiamine 100 milliGRAM(s) Oral daily  tranexamic acid Injectable for Nebulization 500 milliGRAM(s) Inhalation every 8 hours  vasopressin Infusion 0.04 Unit(s)/Min IV Continuous <Continuous>    ROS: unable to obtain    PHYSICAL EXAM:   Vital Signs:  Vital Signs Last 24 Hrs  T(C): 37.7 (07 May 2024 08:00), Max: 38.3 (06 May 2024 20:00)  T(F): 99.9 (07 May 2024 08:00), Max: 100.9 (06 May 2024 20:00)  HR: 80 (07 May 2024 09:00) (77 - 89)  BP: --  BP(mean): --  RR: 28 (07 May 2024 09:00) (28 - 28)  SpO2: 97% (07 May 2024 09:00) (94% - 98%)    Parameters below as of 07 May 2024 09:00  Patient On (Oxygen Delivery Method): ventilator    O2 Concentration (%): 40    GENERAL:  intubated, sedated  HEENT:  sclera icteric  RESPIRATORY:  MV via ETT  CARDIAC:  on multiple vasopressors  ABDOMEN:  Soft  SKIN:  Jaundiced  NEURO:  sedated    LABS:                        7.3    13.24 )-----------( 44       ( 07 May 2024 02:00 )             20.8     Mean Cell Volume: 87.0 fL (05-07-24 @ 02:00)    05-07    133<L>  |  98  |  19  ----------------------------<  189<H>  4.3   |  20<L>  |  0.74    Ca    8.1<L>      07 May 2024 08:15  Phos  2.6     05-07  Mg     2.40     05-07    TPro  6.8  /  Alb  3.5  /  TBili  33.8<H>  /  DBili  x   /  AST  203<H>  /  ALT  101<H>  /  AlkPhos  67  05-07    LIVER FUNCTIONS - ( 07 May 2024 08:15 )  Alb: 3.5 g/dL / Pro: 6.8 g/dL / ALK PHOS: 67 U/L / ALT: 101 U/L / AST: 203 U/L / GGT: x           PT/INR - ( 07 May 2024 08:15 )   PT: 36.9 sec;   INR: 3.41 ratio         PTT - ( 07 May 2024 08:15 )  PTT:41.1 sec  Amylase Serum--      Lipase serum--       [id number]    < from: Upper Endoscopy (05.06.24 @ 17:35) >  Findings:       Fresh blood and blood clots were noted in the oropharynx.       Hematin (altered blood/coffee-ground-like material) was found in the        distal esophagus. Lavaged.       LA Grade B (one or more mucosal breaks greater than 5 mm, not extending        between the tops of two mucosal folds) esophagitis was found in the mid        and distal esophagus with no active bleeding.       A single polyp with abnormal overlying mucosa was found in the distal        esophagus. There was no active bleeding and polyp was not overlying a        varix.       Severe, diffuse portal hypertensive gastropathy was found in the entire        examined stomach with friable mucosa.       Hematin (altered blood/coffee-ground-like material) was found in the        gastric fundus. Lavaged and suctioned with adequate clearance.       Multiple dispersed, erosions were found in the gastric body with minimal        oozing.       The duodenal blub and second portion of the duodenum were normal.                                                                                   Impression:          - Fresh blood in the oropharnx. Suspect a predominantly                        oropharyngeal source of acute bleed.                       - Hematin (altered blood/coffee-ground-like material) in                        the esophagus and stomach.       - LA Grade B esophagitis.                       - Esophageal polyp in the distal esophagus with an                        inflammatory appearance.                       - Portal hypertensive gastropathy.                       - Erosive gastropathy with minimal bleeding.                       - Normal examined duodenum.    < end of copied text >

## 2024-05-08 NOTE — PROGRESS NOTE ADULT - PROBLEM SELECTOR PLAN 1
Pt. with LUBA in setting of hypotension and decompensated liver cirrhosis. Found to be anemic and thrombocytopenic. No obvious bleeding source on EGD. Patient developed ARDS due to liver failure and receiving multiple blood products, remains intubated. He was on Levo, Vaso, Octreotide, and Bumex gtts, currently only on Levo and Vaso. Was non-oliguric, but BUN/Cr continued to rise from 46/1.86 on admission to 70/2.55 on 4/29. Also was acidotic with pH 7.26. Urine studies significant for very low Na<20 suggestive of HRS as well. Despite adequate UOP initially, pt remained net positive and was in ARDS. Hence, CRRT initiated on 4/29/24 for fluid removal. Consent for CRRT was obtained from the father. Tolerating UF 100mL/h, no issues with CRRT catheter or filter clotting overnight. Will continue CRRT as per discussion with MICU team. K trending up, 5.2 this AM; adjusted dialysis bags accordingly. Monitor BP and labs. Avoid nephrotoxins. Overall prognosis remains guarded. Pt. with LUBA in setting of hypotension and decompensated liver cirrhosis. Found to be anemic and thrombocytopenic. No obvious bleeding source on EGD. Patient developed ARDS due to liver failure and receiving multiple blood products, remains intubated. He was on Levo, Vaso, Octreotide, and Bumex gtts, currently only on Levo and Vaso. Was non-oliguric, but BUN/Cr continued to rise from 46/1.86 on admission to 70/2.55 on 4/29. Also was acidotic with pH 7.26. Urine studies significant for very low Na<20 suggestive of HRS as well. Despite adequate UOP initially, pt remained net positive and was in ARDS. Hence, CRRT initiated on 4/29/24 for fluid removal. Consent for CRRT was obtained from the father. Will continue CRRT as per discussion with MICU team and keep net even now. K trending up, 5.2 this AM; adjusted dialysis bags accordingly. No issues with CRRT catheter or filter clotting overnight. Monitor BP and labs. Avoid nephrotoxins. Overall prognosis remains guarded.

## 2024-05-08 NOTE — CHART NOTE - NSCHARTNOTEFT_GEN_A_CORE
Spoke with Father Justice on the phone with the brother present. Father expressed to make patient DNR and allow natural death to occur. Brother also in agreement. MOLST completed. Chart updated.    Carlos Ponce MD  IM Resident, PGY-3  Available via [employer] Teams

## 2024-05-08 NOTE — PROGRESS NOTE ADULT - SUBJECTIVE AND OBJECTIVE BOX
[facility] Division of Kidney Diseases & Hypertension  FOLLOW UP NOTE  [phone]--------------------------------------------------------------------------------  Chief Complaint: LUBA on CRRT    24 hour events/subjective:  Remains critically ill, intubated/sedated, on pressors, anuric on CRRT. POCUS per MICU A-line predominant but IVC 2.3cm with no respiratory variation.        PAST HISTORY  --------------------------------------------------------------------------------  No significant changes to PMH, PSH, FHx, SHx, unless otherwise noted    ALLERGIES & MEDICATIONS  --------------------------------------------------------------------------------  Allergies    Allergy Status Unknown      Standing Inpatient Medications  albuterol/ipratropium for Nebulization 3 milliLiter(s) Nebulizer every 6 hours  cefepime   IVPB 2000 milliGRAM(s) IV Intermittent every 8 hours  chlorhexidine 0.12% Liquid 15 milliLiter(s) Oral Mucosa every 12 hours  chlorhexidine 2% Cloths 1 Application(s) Topical daily  CRRT Treatment    <Continuous>  dexMEDEtomidine Infusion 0.3 MICROgram(s)/kG/Hr IV Continuous <Continuous>  folic acid 1 milliGRAM(s) Oral daily  hydrocortisone sodium succinate Injectable 50 milliGRAM(s) IV Push every 6 hours  ketamine Infusion. 1.5 mG/kG/Hr IV Continuous <Continuous>  lactulose Syrup 30 Gram(s) Oral three times a day  metroNIDAZOLE  IVPB 500 milliGRAM(s) IV Intermittent every 12 hours  multivitamin/minerals/iron Oral Solution (CENTRUM) 15 milliLiter(s) Oral daily  norepinephrine Infusion 0.05 MICROgram(s)/kG/Min IV Continuous <Continuous>  pantoprazole Infusion 8 mG/Hr IV Continuous <Continuous>  petrolatum Ophthalmic Ointment 1 Application(s) Both EYES two times a day  polyethylene glycol 3350 17 Gram(s) Oral two times a day  PrismaSATE Dialysate BK 0 / 3.5 5000 milliLiter(s) CRRT <Continuous>  PrismaSOL Filtration BGK 4 / 2.5 5000 milliLiter(s) CRRT <Continuous>  PrismaSOL Filtration BGK 4 / 2.5 5000 milliLiter(s) CRRT <Continuous>  senna Syrup 15 milliLiter(s) Oral at bedtime  thiamine 100 milliGRAM(s) Oral daily  vasopressin Infusion 0.04 Unit(s)/Min IV Continuous <Continuous>    PRN Inpatient Medications      REVIEW OF SYSTEMS  --------------------------------------------------------------------------------  unable to obtain, pt sedated/intubated    VITALS/PHYSICAL EXAM  --------------------------------------------------------------------------------  T(C): 37.6 (05-08-24 @ 04:00), Max: 37.9 (05-08-24 @ 00:00)  HR: 81 (05-08-24 @ 08:00) (77 - 90)  BP: --  RR: 29 (05-08-24 @ 08:00) (22 - 29)  SpO2: 95% (05-08-24 @ 08:00) (94% - 99%)  Wt(kg): --        05-07-24 @ 07:01 - 05-08-24 @ 07:00  --------------------------------------------------------  IN: 1845.2 mL / OUT: 5574 mL / NET: -3728.8 mL      Physical Exam:  	Gen: sedated and intubated  	HEENT: NC/AT, +ETT, +OGT  	Pulm: Coarse breath sounds bilaterally  	CV: +S1S2  	Abd: +BS, soft  	: geronimo               Extremities: +bilateral LE edema noted                Neuro: sedated  	Skin: Warm and dry, +jaundice  	Dialysis access: RIJ non-tunneled cath    LABS/STUDIES  --------------------------------------------------------------------------------              7.5    12.58 >-----------<  29       [05-08-24 @ 03:44]              21.3     133  |  99  |  21  ----------------------------<  136      [05-08-24 @ 03:44]  5.2   |  18  |  0.73        Ca     8.0     [05-08-24 @ 03:44]      iCa    1.08     [05-08 @ 03:44]      Mg     2.40     [05-08-24 @ 03:44]      Phos  2.8     [05-08-24 @ 03:44]    TPro  6.5  /  Alb  3.2  /  TBili  33.4  /  DBili  x   /  AST  181  /  ALT  99  /  AlkPhos  76  [05-08-24 @ 03:44]    PT/INR: PT 45.8 , INR 4.23       [05-08-24 @ 03:44]  PTT: 40.1       [05-07-24 @ 21:00]      Creatinine Trend:  SCr 0.73 [05-08 @ 03:44]  SCr 0.72 [05-07 @ 21:00]  SCr 0.70 [05-07 @ 14:20]  SCr 0.74 [05-07 @ 08:15]  SCr 0.78 [05-07 @ 02:00]    Urinalysis - [05-08-24 @ 03:44]      Color  / Appearance  / SG  / pH       Gluc 136 / Ketone   / Bili  / Urobili        Blood  / Protein  / Leuk Est  / Nitrite       RBC  / WBC  / Hyaline  / Gran  / Sq Epi  / Non Sq Epi  / Bacteria       Lipid: chol --, , HDL --, LDL --      [05-06-24 @ 12:14]      C4 Complement 6      [05-08-24 @ 03:44]

## 2024-05-08 NOTE — PROGRESS NOTE ADULT - ATTENDING COMMENTS
43 M with etoh use d/o, PTSD went to [facility] for GI bleed c/w acute decompensated liver failure and acute hypoxemic respiratory failure with acute UGIB requiring intubation, tx here, course c/b LUBA requiring dialysis, shock    Patient still with liver failure  not able to meaningfully wake up  still requiring HD  still with acute metabolic encephalopathy due to hyperammonemia  having bloody output from OG tube s/p EGD with no discrete lesion, likely related to coagulopathy    # acute hypoxemic respiratory failure  # acute metabolic encephalopathy  # hepatic encephalopathy  # LUBA  # acute UGIB  - c/w full vent support  - wean sedation as tolerated  - will give fluids to make net positive but otherwise no fluid removal with CRRT   - c/w broad abx, f/u sputum culture  - on steroids for AI, will wean today  - just ppi for ugib per hepatology    overall poor prognosis - ongoing [facility]

## 2024-05-08 NOTE — PROGRESS NOTE ADULT - ASSESSMENT
43M with hx of alcohol use disorder, restless leg syndrome, PVD, anxiety/PTSD ( service), PAD, taking adderall at home, presented as a transfer from [facility] ED for acute decompensated liver and respiratory failure and possible ECMO eval.    NEURO  # encephalopathy  - Pt encephalopathic on admission likely metabolic vs hepatic encephalopathy (ammonia 94). Intubated for airway protection  - s/p paralytic 5/1  - currently sedated (on precedex and ketamine). wean off per ICU guidelines  - maintain RAAS -2, wean per ICU protocol;  - started lactulose 5/4    #Seizures   - no documented history of seizures, appeared to be having seizure like activity during rounds 4/27  - s/o veeg negative  - monitor for now    CARDS  # shock  - likely vasoplegia + sepsis (given fevers and leukocytosis), currently on levo and vaso, stable.  - wean vasopressors per ICU protocol  - maintain MAP 65-70  - abx as below  - TTE 4/27 EF 64%, LAE, mild AR, mild MR    #Aortic calcification/?Bicuspid aortic valve  - Pt with aortic calcification on echo w/ possible bicuspid valve. +systolic murmur  - Echo w/o vegetation    PULM  # ARDS  - P/F reported to be 70 at [facility], reflecting severe ARDS. Now improved to 107  -- Peak pressure 36 w/ Plateau 32; PEEP decreased to 10 and subsequently peak pressure 32 and plateau 29; modify vent setting per blood gas  - POCUS w/ RLL consolidation. ARDS likely i/s/o PNA vs pneumonitis from hematemesis  - maintain lung protective ventilation, wean per ICU protocol  - s/p proning with improvement in oxygenation. Pt now w/ improving O2 requirements and plateau pressure  - Airleak overnight 5/3, ETT changed 5/3  - duonebs q6    RENAL  # renal failure, anuria  - sCr on admit 1.86, baseline unclear. Now worsening 2.55. Pt with decreasing UO s/p bumex gtt and metolazone  - likely hepatorenal syndrome, vs ATN from shock  --- s/p albumin x2 days, on octreotide and levophed  - Renal protective measures: Please renally dose all medications; Avoid nephrotoxic medications (NSAIDS, IV contrast); Avoid ACEi and ARBs in the setting of LUBA; Maintain MAP >65;   - Nephro c/s for CVVH; will continue w/ CVVH for now given pressor requirements and poor UO  - monitor strict I&O  - Bladder scan w/ ~400cc, will give FFP and put in a geronimo to prevent persistent trauma and bleeding from straight caths given elevated INR    #hyponatremia  Likely i/s/o hypotonic fluids and d10 gtt; now improved  - Monitor BMPs    GI  # hematemesis  - EGD at OSH noted blood clots in oropharynx, no obvious bleeding source, normal esophagus  - no epistaxis noted on exam  - 4/28 increased bloody output from OGT, improved  - Hepatology following, no plan for EGD  - protonix 40 mg IVP BID  - New episodes of hematemesis 5/6 s/p EGD without obvious source of bleeding w/ fresh blood in the oropharynx. s/p TXA q8 for 24 hours    # acute liver failure  - likely i/s/o alcohol use; AST / ALT 2:1 ratio. Bilirubin 28   - Last EtOH use: reportedly 4/20/24 per documentation from [employer]  - Smooth muscle ab 1:20   - MELD-Na on admission: 33, trend    - Variceal status: normal esophagus on EGD at OSH  - Per hepatology discontinue NAC  -- If pt improves, may be a candidate for transplant given high MELD per hepatology  - GI following, will appreciate recs  - max tylenol 2000 mg daily    #Bowel regimen  - Pt without BM, s/p relistor x2 i/s/o fent use; now off of fent  - Xray w/ nonobstructive bowel gas pattern  - c/w miralax, senna, lactulose  - s/p enema  - Reglan trial    ID  # Sepsis/septic shock  - POCUS w/ RLL consolidation, possible cause of infection  - UCx and BCx NGTD,   - s/p vanco and azithro, d/c iso neg legionella and mrsa  - previously on zosyn (4/27 - 5/3); see abx below  - Fungitell 200, can be falsely elevated. Will dc fluconazole given elevated LFTs  Pt n/ new fevers o/n 5/2, broaden to flagyl, cefepime, and vancomycin  - MRSA negative, vancomycin discontinued overnight  - f/u infx work-up; RVP negative  -- UA, blood cx sputum cx    ENDO  # hypocortisoliemia  - 8 AM cortisol 8.7  - solucortef 50mg q6    #Low TSH  - TSH 0.10, T4 WNL    #hypoglycemia  likely i/s/o liver failure and not on tube feeds  - start trickle feeds at 10cc  - D10 at 50cc, increase as tolerated    #hypoglycemia  - likely i/s/o liver failure, AI, and full feeds being held for gut motility issue  - c/w d10; hold tube feeds i/s/o hematemesis    HEME/ONC  # bicytopenia (anemia and thrombocytopenia)  # elevated INR  - likely iso liver failure vs acute bleed at OSH  - s/p 4 u prbc and 2 u FFP at OSH. received 1 u prbc, 1 u plt, and 1 u FFP on 4/27  - shiley placement and exchange of lines  -- s/p additional 3u FFP now, 1u plt during shiley palcement, and 1u pRBC 4/29  - transfuse for hb <7, plt <50 for bleeding  - monitor fibrinogen QD  - Vitamin K x4 (4/28-4/29, 5/5 - 5/6)  - s/p cyro, 3u pRBC and 3u plt, 2u FFP 5/6    #Portal vein thrombosis  - Evidence of portal vein thrombosis on US abdomen  - hold AC at this time iso upper GIB    DERM  # jaundice  - likely iso elevated bilirubin, acute liver failure    #B/l LE/UE edema  - VA duplex negative    DVT: dc heparin given elevated INR and decreasing plt  FENGI: tube feeds  LDA: L IJ and radial a-line and R shiley  GTT: levo, vaso, ketamine  GOC: Full code for now, family: father Justice and has a brother. Case discussed with father and updates given.   43M with hx of alcohol use disorder, restless leg syndrome, PVD, anxiety/PTSD ( service), PAD, taking adderall at home, presented as a transfer from [facility] ED for acute decompensated liver and respiratory failure and possible ECMO eval.    NEURO  # encephalopathy  - Pt encephalopathic on admission likely metabolic vs hepatic encephalopathy (ammonia 94). Intubated for airway protection  - s/p paralytic 5/1  - currently sedated (on precedex and ketamine). wean off per ICU guidelines  - maintain RAAS -2, wean per ICU protocol;  - started lactulose 5/4  - Start rifaximin 5/8     #Seizures   - no documented history of seizures, appeared to be having seizure like activity during rounds 4/27  - s/o veeg negative  - monitor for now    CARDS  # shock  - likely vasoplegia + sepsis (given fevers and leukocytosis), currently on levo and vaso, stable.  - wean vasopressors per ICU protocol  - maintain MAP 65-70  - abx as below  - TTE 4/27 EF 64%, LAE, mild AR, mild MR    #Aortic calcification/?Bicuspid aortic valve  - Pt with aortic calcification on echo w/ possible bicuspid valve. +systolic murmur  - Echo w/o vegetation    PULM  # ARDS  - P/F reported to be 70 at [facility], reflecting severe ARDS. Now improved to 107  -- Peak pressure 36 w/ Plateau 32; PEEP decreased to 10 and subsequently peak pressure 32 and plateau 29; modify vent setting per blood gas  - POCUS w/ RLL consolidation. ARDS likely i/s/o PNA vs pneumonitis from hematemesis  - maintain lung protective ventilation, wean per ICU protocol  - s/p proning with improvement in oxygenation. Pt now w/ improving O2 requirements and plateau pressure  - Airleak overnight 5/3, ETT changed 5/3  - duonebs q6    RENAL  # renal failure, anuria  - sCr on admit 1.86, baseline unclear. Now worsening 2.55. Pt with decreasing UO s/p bumex gtt and metolazone  - likely hepatorenal syndrome, vs ATN from shock  --- s/p albumin x2 days, on octreotide and levophed  - Renal protective measures: Please renally dose all medications; Avoid nephrotoxic medications (NSAIDS, IV contrast); Avoid ACEi and ARBs in the setting of LUBA; Maintain MAP >65;   - Nephro c/s for CVVH; will continue w/ CVVH for now given pressor requirements and poor UO. Keep net even  - monitor strict I&O  - Lin removed, monitor UO  - Albumin 5% at 250cc q6 x 24 hours    #hyponatremia  Likely i/s/o hypotonic fluids and d10 gtt; now improved  - Monitor BMPs    GI  # hematemesis  - EGD at OSH noted blood clots in oropharynx, no obvious bleeding source, normal esophagus  - no epistaxis noted on exam  - 4/28 increased bloody output from OGT, improved  - Hepatology following, no plan for EGD  - protonix 40 mg IVP BID  - New episodes of hematemesis 5/6 s/p EGD without obvious source of bleeding w/ fresh blood in the oropharynx.   -- ENT evaluation, recommended TXA nebulizer if bleeding    # acute liver failure  - likely i/s/o alcohol use; AST / ALT 2:1 ratio. Bilirubin 28   - Last EtOH use: reportedly 4/20/24 per documentation from [employer]  - Smooth muscle ab 1:20   - MELD-Na on admission: 33, trend    - Variceal status: normal esophagus on EGD at OSH  - Per hepatology discontinue NAC  -- If pt improves, may be a candidate for transplant given high MELD per hepatology  - GI following, will appreciate recs  - max tylenol 2000 mg daily    #Bowel regimen  - Pt without BM, s/p relistor x2 i/s/o fent use; now off of fent  - Xray w/ nonobstructive bowel gas pattern  - c/w miralax, senna, lactulose  - s/p enema  - Reglan trial, now with BMs    #Diet  c/w tube feeds    ID  # Sepsis/septic shock  - POCUS w/ RLL consolidation, possible cause of infection  - UCx and BCx NGTD,   - s/p vanco and azithro, d/c iso neg legionella and mrsa  - previously on zosyn (4/27 - 5/3); see abx below  - Fungitell 200, can be falsely elevated. Will dc fluconazole given elevated LFTs  Pt n/ new fevers o/n 5/2, broaden to flagyl, cefepime, and vancomycin  - MRSA negative, vancomycin discontinued overnight  - f/u infx work-up; RVP negative  -- UA, blood cx sputum cx    ENDO  # hypocortisoliemia  - 8 AM cortisol 8.7  - solucortef 50mg q8    #Low TSH  - TSH 0.10, T4 WNL    #hypoglycemia  likely i/s/o liver failure and not on tube feeds  - Now on feeds    #hypoglycemia  - likely i/s/o liver failure, AI, and full feeds being held for gut motility issue  - c/w d10; hold tube feeds i/s/o hematemesis    HEME/ONC  # bicytopenia (anemia and thrombocytopenia)  # elevated INR  - likely iso liver failure vs acute bleed at OSH  - s/p 4 u prbc and 2 u FFP at OSH. received 1 u prbc, 1 u plt, and 1 u FFP on 4/27  - shiley placement and exchange of lines  -- s/p additional 3u FFP now, 1u plt during shiley palcement, and 1u pRBC 4/29  - transfuse for hb <7, plt <50 for bleeding  - monitor fibrinogen QD  - Vitamin K x4 (4/28-4/29, 5/5 - 5/6)  - s/p cyro, 3u pRBC and 3u plt, 2u FFP 5/6    #Portal vein thrombosis  - Evidence of portal vein thrombosis on US abdomen  - hold AC at this time iso upper GIB    DERM  # jaundice  - likely iso elevated bilirubin, acute liver failure    #B/l LE/UE edema  - VA duplex negative    DVT: dc heparin given elevated INR and decreasing plt  FENGI: tube feeds  LDA: L IJ and radial a-line and R shiley  GTT: levo, vaso, ketamine  GOC: Full code for now, family: father Justice and has a brother. Case discussed with father and updates given.

## 2024-05-08 NOTE — PROGRESS NOTE ADULT - ATTENDING COMMENTS
continue supportive care per MICU and CRRT  no fluid removal today given low volume state per MICU PoCUS   Assessment and plan as outlined above. Monitor labs and urine output. Avoid any potential nephrotoxins. Dose medications as per eGFR.

## 2024-05-08 NOTE — PROGRESS NOTE ADULT - PROBLEM SELECTOR PLAN 2
Na 128 on 5/5. Would limit infusions constituted in D5w and also limit D10 infusion if possible. Na has improved to 133 today after discontinuing D10. Continue to monitor.      Jolanta Mata, DO  Nephrology Fellow  Feel free to contact me directly on TEAMS with any questions.  (After 5pm or on weekends, please call the on-call fellow).

## 2024-05-08 NOTE — PROGRESS NOTE ADULT - SUBJECTIVE AND OBJECTIVE BOX
Carlos Ponce  PGY-3 | Internal Medicine      INTERVAL HPI/OVERNIGHT EVENTS: Pt off of propofol. D10 discontinued. Also made net even on CVVH    SUBJECTIVE: Patient seen and examined at bedside. Patient is intubated and sedated    OBJECTIVE:    VITAL SIGNS:  ICU Vital Signs Last 24 Hrs  T(C): 37.6 (08 May 2024 04:00), Max: 37.9 (08 May 2024 00:00)  T(F): 99.6 (08 May 2024 04:00), Max: 100.2 (08 May 2024 00:00)  HR: 79 (08 May 2024 05:00) (77 - 90)  BP: --  BP(mean): --  ABP: 109/44 (08 May 2024 05:00) (97/48 - 132/57)  ABP(mean): 64 (08 May 2024 05:00) (63 - 81)  RR: 22 (08 May 2024 02:00) (22 - 28)  SpO2: 94% (08 May 2024 05:00) (94% - 98%)    O2 Parameters below as of 08 May 2024 03:22  Patient On (Oxygen Delivery Method): ventilator          Mode: AC/ CMV (Assist Control/ Continuous Mandatory Ventilation), RR (machine): 22, TV (machine): 430, FiO2: 40, PEEP: 5, ITime: 0.57, MAP: 12, PIP: 31    05-07 @ 07:01  -  05-08 @ 07:00  --------------------------------------------------------  IN: 1845.2 mL / OUT: 5574 mL / NET: -3728.8 mL      CAPILLARY BLOOD GLUCOSE          PHYSICAL EXAM:    GENERAL: intubated, sedated  HENMT: Atraumatic, moist mucous membranes, no oropharyngeal exudates or vesicles, uvula is midline EYES: scleral icterus, PERRL,  HEART: RRR, S1/S2, no murmur/gallops/rubs  RESPIRATORY: Clear to auscultation bilaterally, no wheezes/rhonchi/rales  ABDOMEN: soft, nontender, protuberant  EXTREMITIES: 1+ lower extremity edema, +2 radial pulses b/l  NEURO:  intubated, sedated   Heme/LYMPH: ecchymosis on RLE/calf area  SKIN: jaundiced    MEDICATIONS:  MEDICATIONS  (STANDING):  albuterol/ipratropium for Nebulization 3 milliLiter(s) Nebulizer every 6 hours  cefepime   IVPB 2000 milliGRAM(s) IV Intermittent every 8 hours  chlorhexidine 0.12% Liquid 15 milliLiter(s) Oral Mucosa every 12 hours  chlorhexidine 2% Cloths 1 Application(s) Topical daily  CRRT Treatment    <Continuous>  dexMEDEtomidine Infusion 0.3 MICROgram(s)/kG/Hr (6.91 mL/Hr) IV Continuous <Continuous>  folic acid 1 milliGRAM(s) Oral daily  hydrocortisone sodium succinate Injectable 50 milliGRAM(s) IV Push every 6 hours  ketamine Infusion. 1.5 mG/kG/Hr (13.8 mL/Hr) IV Continuous <Continuous>  lactulose Syrup 30 Gram(s) Oral three times a day  metroNIDAZOLE  IVPB 500 milliGRAM(s) IV Intermittent every 12 hours  multivitamin/minerals/iron Oral Solution (CENTRUM) 15 milliLiter(s) Oral daily  norepinephrine Infusion 0.05 MICROgram(s)/kG/Min (4.32 mL/Hr) IV Continuous <Continuous>  pantoprazole Infusion 8 mG/Hr (10 mL/Hr) IV Continuous <Continuous>  petrolatum Ophthalmic Ointment 1 Application(s) Both EYES two times a day  polyethylene glycol 3350 17 Gram(s) Oral two times a day  PrismaSATE Dialysate BK 0 / 3.5 5000 milliLiter(s) (2500 mL/Hr) CRRT <Continuous>  PrismaSOL Filtration BGK 4 / 2.5 5000 milliLiter(s) (2000 mL/Hr) CRRT <Continuous>  PrismaSOL Filtration BGK 4 / 2.5 5000 milliLiter(s) (200 mL/Hr) CRRT <Continuous>  senna Syrup 15 milliLiter(s) Oral at bedtime  thiamine 100 milliGRAM(s) Oral daily  vasopressin Infusion 0.04 Unit(s)/Min (6 mL/Hr) IV Continuous <Continuous>    MEDICATIONS  (PRN):      ALLERGIES:  Allergies    Allergy Status Unknown    Intolerances        LABS:                        7.5    12.58 )-----------( 29       ( 08 May 2024 03:44 )             21.3     05-08    133<L>  |  99  |  21  ----------------------------<  136<H>  5.2   |  18<L>  |  0.73    Ca    8.0<L>      08 May 2024 03:44  Phos  2.8     05-08  Mg     2.40     05-08    TPro  6.5  /  Alb  3.2<L>  /  TBili  33.4<H>  /  DBili  x   /  AST  181<H>  /  ALT  99<H>  /  AlkPhos  76  05-08    PT/INR - ( 08 May 2024 03:44 )   PT: 45.8 sec;   INR: 4.23 ratio         PTT - ( 07 May 2024 21:00 )  PTT:40.1 sec  Urinalysis Basic - ( 08 May 2024 03:44 )    Color: x / Appearance: x / SG: x / pH: x  Gluc: 136 mg/dL / Ketone: x  / Bili: x / Urobili: x   Blood: x / Protein: x / Nitrite: x   Leuk Esterase: x / RBC: x / WBC x   Sq Epi: x / Non Sq Epi: x / Bacteria: x        RADIOLOGY & ADDITIONAL TESTS: Reviewed.

## 2024-05-09 NOTE — PROVIDER CONTACT NOTE (OTHER) - ASSESSMENT
Patient on vasopressors, ketamine and precedex, on CRRT, bedbound. DTI to left ear measuring 0.8x0.9x0.1

## 2024-05-09 NOTE — PROGRESS NOTE ADULT - SUBJECTIVE AND OBJECTIVE BOX
Carlos Ponce  PGY-3 | Internal Medicine      INTERVAL HPI/OVERNIGHT EVENTS:    SUBJECTIVE: Patient seen and examined at bedside. Patient is intubated and sedated      OBJECTIVE:    VITAL SIGNS:  ICU Vital Signs Last 24 Hrs  T(C): 37 (09 May 2024 04:00), Max: 37.8 (08 May 2024 16:00)  T(F): 98.6 (09 May 2024 04:00), Max: 100 (08 May 2024 16:00)  HR: 81 (09 May 2024 07:02) (79 - 96)  BP: --  BP(mean): --  ABP: 121/46 (09 May 2024 07:00) (115/43 - 161/66)  ABP(mean): 70 (09 May 2024 07:00) (63 - 96)  RR: 23 (09 May 2024 07:00) (22 - 29)  SpO2: 93% (09 May 2024 07:02) (92% - 100%)    O2 Parameters below as of 09 May 2024 07:00  Patient On (Oxygen Delivery Method): ventilator    O2 Concentration (%): 40      Mode: AC/ CMV (Assist Control/ Continuous Mandatory Ventilation), RR (machine): 22, TV (machine): 430, FiO2: 40, PEEP: 5, ITime: 0.65, MAP: 10, PIP: 24    05-08 @ 07:01  -  05-09 @ 07:00  --------------------------------------------------------  IN: 3784.6 mL / OUT: 1700 mL / NET: 2084.6 mL      CAPILLARY BLOOD GLUCOSE          PHYSICAL EXAM:    GENERAL: intubated, sedated  HENMT: Atraumatic, moist mucous membranes, no oropharyngeal exudates or vesicles, uvula is midline EYES: scleral icterus, PERRL,  HEART: RRR, S1/S2, no murmur/gallops/rubs  RESPIRATORY: Clear to auscultation bilaterally, no wheezes/rhonchi/rales  ABDOMEN: soft, nontender, protuberant  EXTREMITIES: 1+ lower extremity edema, +2 radial pulses b/l  NEURO:  intubated, sedated   Heme/LYMPH: ecchymosis on RLE/calf area  SKIN: jaundiced    MEDICATIONS:  MEDICATIONS  (STANDING):  albuterol/ipratropium for Nebulization 3 milliLiter(s) Nebulizer every 6 hours  cefepime   IVPB 2000 milliGRAM(s) IV Intermittent every 8 hours  chlorhexidine 0.12% Liquid 15 milliLiter(s) Oral Mucosa every 12 hours  chlorhexidine 2% Cloths 1 Application(s) Topical daily  CRRT Treatment    <Continuous>  CRRT Treatment    <Continuous>  dexMEDEtomidine Infusion 0.3 MICROgram(s)/kG/Hr (6.91 mL/Hr) IV Continuous <Continuous>  folic acid 1 milliGRAM(s) Oral daily  hydrocortisone sodium succinate Injectable 50 milliGRAM(s) IV Push every 8 hours  ketamine Infusion. 1.5 mG/kG/Hr (13.8 mL/Hr) IV Continuous <Continuous>  lactulose Syrup 30 Gram(s) Oral three times a day  metroNIDAZOLE  IVPB 500 milliGRAM(s) IV Intermittent every 12 hours  multivitamin/minerals/iron Oral Solution (CENTRUM) 15 milliLiter(s) Oral daily  norepinephrine Infusion 0.05 MICROgram(s)/kG/Min (4.32 mL/Hr) IV Continuous <Continuous>  pantoprazole  Injectable 40 milliGRAM(s) IV Push two times a day  petrolatum Ophthalmic Ointment 1 Application(s) Both EYES two times a day  phytonadione  IVPB 10 milliGRAM(s) IV Intermittent daily  polyethylene glycol 3350 17 Gram(s) Oral two times a day  PrismaSATE Dialysate BK 0 / 3.5 5000 milliLiter(s) (2500 mL/Hr) CRRT <Continuous>  PrismaSATE Dialysate BK 0 / 3.5 5000 milliLiter(s) (2500 mL/Hr) CRRT <Continuous>  PrismaSOL Filtration BGK 4 / 2.5 5000 milliLiter(s) (200 mL/Hr) CRRT <Continuous>  PrismaSOL Filtration BGK 4 / 2.5 5000 milliLiter(s) (200 mL/Hr) CRRT <Continuous>  PrismaSOL Filtration BGK 4 / 2.5 5000 milliLiter(s) (2000 mL/Hr) CRRT <Continuous>  PrismaSOL Filtration BGK 4 / 2.5 5000 milliLiter(s) (2000 mL/Hr) CRRT <Continuous>  rifAXIMin 550 milliGRAM(s) Oral two times a day  senna Syrup 15 milliLiter(s) Oral at bedtime  thiamine 100 milliGRAM(s) Oral daily  vasopressin Infusion 0.04 Unit(s)/Min (6 mL/Hr) IV Continuous <Continuous>    MEDICATIONS  (PRN):      ALLERGIES:  Allergies    Allergy Status Unknown    Intolerances        LABS:                        7.0    16.49 )-----------( 29       ( 09 May 2024 05:00 )             19.8     05-09    133<L>  |  98  |  23  ----------------------------<  121<H>  3.6   |  21<L>  |  0.61    Ca    8.9      09 May 2024 05:00  Phos  2.5     05-09  Mg     2.20     05-09    TPro  6.3  /  Alb  3.2<L>  /  TBili  35.4<H>  /  DBili  x   /  AST  160<H>  /  ALT  104<H>  /  AlkPhos  74  05-09    PT/INR - ( 09 May 2024 05:00 )   PT: 55.7 sec;   INR: 5.22 ratio         PTT - ( 09 May 2024 05:00 )  PTT:45.2 sec  Urinalysis Basic - ( 09 May 2024 05:00 )    Color: x / Appearance: x / SG: x / pH: x  Gluc: 121 mg/dL / Ketone: x  / Bili: x / Urobili: x   Blood: x / Protein: x / Nitrite: x   Leuk Esterase: x / RBC: x / WBC x   Sq Epi: x / Non Sq Epi: x / Bacteria: x        RADIOLOGY & ADDITIONAL TESTS: Reviewed. Carlos Ponce  PGY-3 | Internal Medicine      INTERVAL HPI/OVERNIGHT EVENTS: JENN; this AM, pt hypoxic to 90-91%, FiO2 increase to 50%    SUBJECTIVE: Patient seen and examined at bedside. Patient is intubated and sedated      OBJECTIVE:    VITAL SIGNS:  ICU Vital Signs Last 24 Hrs  T(C): 37 (09 May 2024 04:00), Max: 37.8 (08 May 2024 16:00)  T(F): 98.6 (09 May 2024 04:00), Max: 100 (08 May 2024 16:00)  HR: 81 (09 May 2024 07:02) (79 - 96)  BP: --  BP(mean): --  ABP: 121/46 (09 May 2024 07:00) (115/43 - 161/66)  ABP(mean): 70 (09 May 2024 07:00) (63 - 96)  RR: 23 (09 May 2024 07:00) (22 - 29)  SpO2: 93% (09 May 2024 07:02) (92% - 100%)    O2 Parameters below as of 09 May 2024 07:00  Patient On (Oxygen Delivery Method): ventilator    O2 Concentration (%): 40      Mode: AC/ CMV (Assist Control/ Continuous Mandatory Ventilation), RR (machine): 22, TV (machine): 430, FiO2: 40, PEEP: 5, ITime: 0.65, MAP: 10, PIP: 24    05-08 @ 07:01  -  05-09 @ 07:00  --------------------------------------------------------  IN: 3784.6 mL / OUT: 1700 mL / NET: 2084.6 mL      CAPILLARY BLOOD GLUCOSE          PHYSICAL EXAM:    GENERAL: intubated, sedated  HENMT: Atraumatic, moist mucous membranes, no oropharyngeal exudates or vesicles, uvula is midline EYES: scleral icterus, PERRL,  HEART: RRR, S1/S2, no murmur/gallops/rubs  RESPIRATORY: Clear to auscultation bilaterally, no wheezes/rhonchi/rales  ABDOMEN: soft, nontender, protuberant  EXTREMITIES: 1+ lower extremity edema, +2 radial pulses b/l  NEURO:  intubated, sedated   Heme/LYMPH: ecchymosis on RLE/calf area  SKIN: jaundiced    MEDICATIONS:  MEDICATIONS  (STANDING):  albuterol/ipratropium for Nebulization 3 milliLiter(s) Nebulizer every 6 hours  cefepime   IVPB 2000 milliGRAM(s) IV Intermittent every 8 hours  chlorhexidine 0.12% Liquid 15 milliLiter(s) Oral Mucosa every 12 hours  chlorhexidine 2% Cloths 1 Application(s) Topical daily  CRRT Treatment    <Continuous>  CRRT Treatment    <Continuous>  dexMEDEtomidine Infusion 0.3 MICROgram(s)/kG/Hr (6.91 mL/Hr) IV Continuous <Continuous>  folic acid 1 milliGRAM(s) Oral daily  hydrocortisone sodium succinate Injectable 50 milliGRAM(s) IV Push every 8 hours  ketamine Infusion. 1.5 mG/kG/Hr (13.8 mL/Hr) IV Continuous <Continuous>  lactulose Syrup 30 Gram(s) Oral three times a day  metroNIDAZOLE  IVPB 500 milliGRAM(s) IV Intermittent every 12 hours  multivitamin/minerals/iron Oral Solution (CENTRUM) 15 milliLiter(s) Oral daily  norepinephrine Infusion 0.05 MICROgram(s)/kG/Min (4.32 mL/Hr) IV Continuous <Continuous>  pantoprazole  Injectable 40 milliGRAM(s) IV Push two times a day  petrolatum Ophthalmic Ointment 1 Application(s) Both EYES two times a day  phytonadione  IVPB 10 milliGRAM(s) IV Intermittent daily  polyethylene glycol 3350 17 Gram(s) Oral two times a day  PrismaSATE Dialysate BK 0 / 3.5 5000 milliLiter(s) (2500 mL/Hr) CRRT <Continuous>  PrismaSATE Dialysate BK 0 / 3.5 5000 milliLiter(s) (2500 mL/Hr) CRRT <Continuous>  PrismaSOL Filtration BGK 4 / 2.5 5000 milliLiter(s) (200 mL/Hr) CRRT <Continuous>  PrismaSOL Filtration BGK 4 / 2.5 5000 milliLiter(s) (200 mL/Hr) CRRT <Continuous>  PrismaSOL Filtration BGK 4 / 2.5 5000 milliLiter(s) (2000 mL/Hr) CRRT <Continuous>  PrismaSOL Filtration BGK 4 / 2.5 5000 milliLiter(s) (2000 mL/Hr) CRRT <Continuous>  rifAXIMin 550 milliGRAM(s) Oral two times a day  senna Syrup 15 milliLiter(s) Oral at bedtime  thiamine 100 milliGRAM(s) Oral daily  vasopressin Infusion 0.04 Unit(s)/Min (6 mL/Hr) IV Continuous <Continuous>    MEDICATIONS  (PRN):      ALLERGIES:  Allergies    Allergy Status Unknown    Intolerances        LABS:                        7.0    16.49 )-----------( 29       ( 09 May 2024 05:00 )             19.8     05-09    133<L>  |  98  |  23  ----------------------------<  121<H>  3.6   |  21<L>  |  0.61    Ca    8.9      09 May 2024 05:00  Phos  2.5     05-09  Mg     2.20     05-09    TPro  6.3  /  Alb  3.2<L>  /  TBili  35.4<H>  /  DBili  x   /  AST  160<H>  /  ALT  104<H>  /  AlkPhos  74  05-09    PT/INR - ( 09 May 2024 05:00 )   PT: 55.7 sec;   INR: 5.22 ratio         PTT - ( 09 May 2024 05:00 )  PTT:45.2 sec  Urinalysis Basic - ( 09 May 2024 05:00 )    Color: x / Appearance: x / SG: x / pH: x  Gluc: 121 mg/dL / Ketone: x  / Bili: x / Urobili: x   Blood: x / Protein: x / Nitrite: x   Leuk Esterase: x / RBC: x / WBC x   Sq Epi: x / Non Sq Epi: x / Bacteria: x        RADIOLOGY & ADDITIONAL TESTS: Reviewed.

## 2024-05-09 NOTE — PROGRESS NOTE ADULT - PROBLEM SELECTOR PLAN 2
Na 128 on 5/5. Would limit infusions constituted in D5w and also limit D10 infusion if possible. Na has improved to 133 today after discontinuing D10. Continue to monitor.      Jolanta Mata, DO  Nephrology Fellow  Feel free to contact me directly on TEAMS with any questions.  (After 5pm or on weekends, please call the on-call fellow). Would limit infusions constituted in D5w and also limit D10 infusion if possible. Na has improved to 133 today after discontinuing D10. Continue to monitor.      Jolanta Mata, DO  Nephrology Fellow  Feel free to contact me directly on TEAMS with any questions.  (After 5pm or on weekends, please call the on-call fellow).

## 2024-05-09 NOTE — PROGRESS NOTE ADULT - ATTENDING COMMENTS
43 M with etoh use d/o, PTSD went to [place] for GI bleed c/w acute decompensated liver failure and acute hypoxemic respiratory failure with acute UGIB requiring intubation, tx here, course c/b LUBA requiring dialysis, shock    Patient still with liver failure  not able to meaningfully wake up  still requiring HD  still with acute metabolic encephalopathy due to hyperammonemia  having acute blood loss anemia of unclear source    # acute hypoxemic respiratory failure  # acute metabolic encephalopathy  # hepatic encephalopathy  # LUBA  # acute UGIB  - c/w full vent support  - wean sedation as tolerated  - net even with CRRT  - f/u sputum culture  - on steroids for AI, will leave q8h   - just ppi for ugib per hepatology  - MELD score worsening, overall poor prognosis. Ongoing GOC with brother, dad.

## 2024-05-09 NOTE — PROGRESS NOTE ADULT - SUBJECTIVE AND OBJECTIVE BOX
[facility] Division of Kidney Diseases & Hypertension  FOLLOW UP NOTE  [phone]--------------------------------------------------------------------------------    Reason for consult: Oliguric LUBA on CRRT      24 hour events/subjective: Remains critically ill, intubated and sedated, on pressors, anuric and on CRRT. Pt made DNR per MICU discussion with family.      PAST HISTORY  --------------------------------------------------------------------------------  No significant changes to PMH, PSH, FHx, SHx, unless otherwise noted    ALLERGIES & MEDICATIONS  --------------------------------------------------------------------------------  Allergies    Allergy Status Unknown      Standing Inpatient Medications  albuterol/ipratropium for Nebulization 3 milliLiter(s) Nebulizer every 6 hours  cefepime   IVPB 2000 milliGRAM(s) IV Intermittent every 8 hours  chlorhexidine 0.12% Liquid 15 milliLiter(s) Oral Mucosa every 12 hours  chlorhexidine 2% Cloths 1 Application(s) Topical daily  CRRT Treatment    <Continuous>  CRRT Treatment    <Continuous>  dexMEDEtomidine Infusion 0.3 MICROgram(s)/kG/Hr IV Continuous <Continuous>  folic acid 1 milliGRAM(s) Oral daily  hydrocortisone sodium succinate Injectable 50 milliGRAM(s) IV Push every 8 hours  ketamine Infusion. 1.5 mG/kG/Hr IV Continuous <Continuous>  lactulose Syrup 30 Gram(s) Oral three times a day  metroNIDAZOLE  IVPB 500 milliGRAM(s) IV Intermittent every 12 hours  multivitamin/minerals/iron Oral Solution (CENTRUM) 15 milliLiter(s) Oral daily  norepinephrine Infusion 0.05 MICROgram(s)/kG/Min IV Continuous <Continuous>  pantoprazole  Injectable 40 milliGRAM(s) IV Push two times a day  petrolatum Ophthalmic Ointment 1 Application(s) Both EYES two times a day  phytonadione  IVPB 10 milliGRAM(s) IV Intermittent daily  polyethylene glycol 3350 17 Gram(s) Oral two times a day  PrismaSATE Dialysate BK 0 / 3.5 5000 milliLiter(s) CRRT <Continuous>  PrismaSATE Dialysate BK 0 / 3.5 5000 milliLiter(s) CRRT <Continuous>  PrismaSOL Filtration BGK 4 / 2.5 5000 milliLiter(s) CRRT <Continuous>  PrismaSOL Filtration BGK 4 / 2.5 5000 milliLiter(s) CRRT <Continuous>  PrismaSOL Filtration BGK 4 / 2.5 5000 milliLiter(s) CRRT <Continuous>  PrismaSOL Filtration BGK 4 / 2.5 5000 milliLiter(s) CRRT <Continuous>  rifAXIMin 550 milliGRAM(s) Oral two times a day  senna Syrup 15 milliLiter(s) Oral at bedtime  thiamine 100 milliGRAM(s) Oral daily  vasopressin Infusion 0.04 Unit(s)/Min IV Continuous <Continuous>    PRN Inpatient Medications      REVIEW OF SYSTEMS  --------------------------------------------------------------------------------  Unable to obtain, pt intubated/sedated    VITALS/PHYSICAL EXAM  --------------------------------------------------------------------------------  T(C): 36.6 (05-09-24 @ 08:00), Max: 37.8 (05-08-24 @ 16:00)  HR: 80 (05-09-24 @ 09:30) (80 - 96)  BP: --  RR: 24 (05-09-24 @ 09:00) (21 - 29)  SpO2: 94% (05-09-24 @ 09:30) (90% - 100%)  Wt(kg): --        05-08-24 @ 07:01  -  05-09-24 @ 07:00  --------------------------------------------------------  IN: 3784.6 mL / OUT: 1866 mL / NET: 1918.6 mL    05-09-24 @ 07:01  -  05-09-24 @ 09:43  --------------------------------------------------------  IN: 118.5 mL / OUT: 148 mL / NET: -29.5 mL      Physical Exam:                       Gen: intubated/sedated                       Pulm: coarse breath sounds, vented                       CV: +S1S2                       Abd: +BS, soft                       : egronimo                       Extremities: ++bilateral LE edema                       Neuro: sedated                       Dialysis access: RIJ non-tunneled cath    LABS/STUDIES  --------------------------------------------------------------------------------              7.0    16.49 >-----------<  29       [05-09-24 @ 05:00]              19.8     133  |  98  |  23  ----------------------------<  121      [05-09-24 @ 05:00]  3.6   |  21  |  0.61        Ca     8.9     [05-09-24 @ 05:00]      iCa    1.24     [05-09 @ 05:00]      Mg     2.20     [05-09-24 @ 05:00]      Phos  2.5     [05-09-24 @ 05:00]    TPro  6.3  /  Alb  3.2  /  TBili  35.4  /  DBili  x   /  AST  160  /  ALT  104  /  AlkPhos  74  [05-09-24 @ 05:00]    PT/INR: PT 55.7 , INR 5.22       [05-09-24 @ 05:00]  PTT: 45.2       [05-09-24 @ 05:00]      Creatinine Trend:  SCr 0.61 [05-09 @ 05:00]  SCr 0.63 [05-08 @ 22:45]  SCr 0.70 [05-08 @ 16:30]  SCr 0.71 [05-08 @ 07:49]  SCr 0.73 [05-08 @ 03:44]    Urinalysis - [05-09-24 @ 05:00]      Color  / Appearance  / SG  / pH       Gluc 121 / Ketone   / Bili  / Urobili        Blood  / Protein  / Leuk Est  / Nitrite       RBC  / WBC  / Hyaline  / Gran  / Sq Epi  / Non Sq Epi  / Bacteria       Lipid: chol --, , HDL --, LDL --      [05-06-24 @ 12:14]      C4 Complement 6      [05-08-24 @ 03:44]

## 2024-05-09 NOTE — PROGRESS NOTE ADULT - ATTENDING COMMENTS
MSOF including renal and liver failure  continue CRRT   prognosis guarded  family aware  discussed with MICU

## 2024-05-09 NOTE — PROGRESS NOTE ADULT - ASSESSMENT
43M with hx of alcohol use disorder, restless leg syndrome, PVD, anxiety/PTSD ( service), PAD, taking adderall at home, presented as a transfer from [facility] ED for acute decompensated liver and respiratory failure and possible ECMO eval.    NEURO  # encephalopathy  - Pt encephalopathic on admission likely metabolic vs hepatic encephalopathy (ammonia 94). Intubated for airway protection  - s/p paralytic 5/1  - currently sedated (on precedex and ketamine). wean off per ICU guidelines  - maintain RAAS -2, wean per ICU protocol;  - started lactulose 5/4  - Start rifaximin 5/8     #Seizures   - no documented history of seizures, appeared to be having seizure like activity during rounds 4/27  - s/o veeg negative  - monitor for now    CARDS  # shock  - likely vasoplegia + sepsis (given fevers and leukocytosis), currently on levo and vaso, stable.  - wean vasopressors per ICU protocol  - maintain MAP 65-70  - abx as below  - TTE 4/27 EF 64%, LAE, mild AR, mild MR    #Aortic calcification/?Bicuspid aortic valve  - Pt with aortic calcification on echo w/ possible bicuspid valve. +systolic murmur  - Echo w/o vegetation    PULM  # ARDS  - P/F reported to be 70 at [facility], reflecting severe ARDS. Now improved to 107  -- Peak pressure 36 w/ Plateau 32; PEEP decreased to 10 and subsequently peak pressure 32 and plateau 29; modify vent setting per blood gas  - POCUS w/ RLL consolidation. ARDS likely i/s/o PNA vs pneumonitis from hematemesis  - maintain lung protective ventilation, wean per ICU protocol  - s/p proning with improvement in oxygenation. Pt now w/ improving O2 requirements and plateau pressure  - Airleak overnight 5/3, ETT changed 5/3  - duonebs q6    RENAL  # renal failure, anuria  - sCr on admit 1.86, baseline unclear. Now worsening 2.55. Pt with decreasing UO s/p bumex gtt and metolazone  - likely hepatorenal syndrome, vs ATN from shock  --- s/p albumin x2 days, on octreotide and levophed  - Renal protective measures: Please renally dose all medications; Avoid nephrotoxic medications (NSAIDS, IV contrast); Avoid ACEi and ARBs in the setting of LUBA; Maintain MAP >65;   - Nephro c/s for CVVH; will continue w/ CVVH for now given pressor requirements and poor UO. Keep net even  - monitor strict I&O  - Lin removed, monitor UO  - Albumin 5% at 250cc q6 x 24 hours    #hyponatremia  Likely i/s/o hypotonic fluids and d10 gtt; now improved  - Monitor BMPs    GI  # hematemesis  - EGD at OSH noted blood clots in oropharynx, no obvious bleeding source, normal esophagus  - no epistaxis noted on exam  - 4/28 increased bloody output from OGT, improved  - Hepatology following, no plan for EGD  - protonix 40 mg IVP BID  - New episodes of hematemesis 5/6 s/p EGD without obvious source of bleeding w/ fresh blood in the oropharynx.   -- ENT evaluation, recommended TXA nebulizer if bleeding    # acute liver failure  - likely i/s/o alcohol use; AST / ALT 2:1 ratio. Bilirubin 28   - Last EtOH use: reportedly 4/20/24 per documentation from [email or web address]  - Smooth muscle ab 1:20   - MELD-Na on admission: 33, trend    - Variceal status: normal esophagus on EGD at OSH  - Per hepatology discontinue NAC  -- If pt improves, may be a candidate for transplant given high MELD per hepatology  - GI following, will appreciate recs  - max tylenol 2000 mg daily    #Bowel regimen  - Pt without BM, s/p relistor x2 i/s/o fent use; now off of fent  - Xray w/ nonobstructive bowel gas pattern  - c/w miralax, senna, lactulose  - s/p enema  - Reglan trial, now with BMs    #Diet  c/w tube feeds    ID  # Sepsis/septic shock  - POCUS w/ RLL consolidation, possible cause of infection  - UCx and BCx NGTD,   - s/p vanco and azithro, d/c iso neg legionella and mrsa  - previously on zosyn (4/27 - 5/3); see abx below  - Fungitell 200, can be falsely elevated. Will dc fluconazole given elevated LFTs  Pt n/ new fevers o/n 5/2, broaden to flagyl, cefepime, and vancomycin  - MRSA negative, vancomycin discontinued overnight  - f/u infx work-up; RVP negative  -- UA, blood cx sputum cx    ENDO  # hypocortisoliemia  - 8 AM cortisol 8.7  - solucortef 50mg q8    #Low TSH  - TSH 0.10, T4 WNL    #hypoglycemia  likely i/s/o liver failure and not on tube feeds  - Now on feeds    #hypoglycemia  - likely i/s/o liver failure, AI, and full feeds being held for gut motility issue  - c/w d10; hold tube feeds i/s/o hematemesis    HEME/ONC  # bicytopenia (anemia and thrombocytopenia)  # elevated INR  - likely iso liver failure vs acute bleed at OSH  - s/p 4 u prbc and 2 u FFP at OSH. received 1 u prbc, 1 u plt, and 1 u FFP on 4/27  - shiley placement and exchange of lines  -- s/p additional 3u FFP now, 1u plt during shiley palcement, and 1u pRBC 4/29  - transfuse for hb <7, plt <50 for bleeding  - monitor fibrinogen QD  - Vitamin K x4 (4/28-4/29, 5/5 - 5/6)  - s/p cyro, 3u pRBC and 3u plt, 2u FFP 5/6    #Portal vein thrombosis  - Evidence of portal vein thrombosis on US abdomen  - hold AC at this time iso upper GIB    DERM  # jaundice  - likely iso elevated bilirubin, acute liver failure    #B/l LE/UE edema  - VA duplex negative    DVT: dc heparin given elevated INR and decreasing plt  FENGI: tube feeds  LDA: L IJ and radial a-line and R shiley  GTT: levo, vaso, ketamine, precedex  GOC: Overnight DNR reversed by family. Family updated today regarding updates   43M with hx of alcohol use disorder, restless leg syndrome, PVD, anxiety/PTSD ( service), PAD, taking adderall at home, presented as a transfer from [facility] ED for acute decompensated liver and respiratory failure and possible ECMO eval.    NEURO  # encephalopathy  - Pt encephalopathic on admission likely metabolic vs hepatic encephalopathy (ammonia 94). Intubated for airway protection  - s/p paralytic 5/1  - currently sedated (on precedex and ketamine). wean off per ICU guidelines  - maintain RAAS -2, wean per ICU protocol;  - started lactulose 5/4  - Start rifaximin 5/8     #Seizures   - no documented history of seizures, appeared to be having seizure like activity during rounds 4/27  - s/o veeg negative  - monitor for now    CARDS  # shock  - likely vasoplegia + sepsis (given fevers and leukocytosis), currently on levo and vaso, stable.  - wean vasopressors per ICU protocol  - maintain MAP 65-70  - abx as below  - TTE 4/27 EF 64%, LAE, mild AR, mild MR    #Aortic calcification/?Bicuspid aortic valve  - Pt with aortic calcification on echo w/ possible bicuspid valve. +systolic murmur  - Echo w/o vegetation    PULM  # ARDS  - P/F reported to be 70 at [facility], reflecting severe ARDS. Now improved to 107  -- Peak pressure 36 w/ Plateau 32; PEEP decreased to 10 and subsequently peak pressure 32 and plateau 29; modify vent setting per blood gas  - POCUS w/ RLL consolidation. ARDS likely i/s/o PNA vs pneumonitis from hematemesis  - maintain lung protective ventilation, wean per ICU protocol  - s/p proning with improvement in oxygenation. Pt now w/ improving O2 requirements and plateau pressure  - Airleak overnight 5/3, ETT changed 5/3  - duonebs q6    RENAL  # renal failure, anuria  - sCr on admit 1.86, baseline unclear. Now worsening 2.55. Pt with decreasing UO s/p bumex gtt and metolazone  - likely hepatorenal syndrome, vs ATN from shock  --- s/p albumin x2 days, on octreotide and levophed  - Renal protective measures: Please renally dose all medications; Avoid nephrotoxic medications (NSAIDS, IV contrast); Avoid ACEi and ARBs in the setting of LUBA; Maintain MAP >65;   - Nephro c/s for CVVH; will continue w/ CVVH for now given pressor requirements and poor UO. Keep net even  - monitor strict I&O  - Lin removed, monitor UO  - Albumin 5% at 250cc q6 x 24 hours    #hyponatremia  Likely i/s/o hypotonic fluids and d10 gtt; now improved  - Monitor BMPs    GI  # hematemesis  - EGD at OSH noted blood clots in oropharynx, no obvious bleeding source, normal esophagus  - no epistaxis noted on exam  - 4/28 increased bloody output from OGT, improved  - Hepatology following, no plan for EGD  - protonix 40 mg IVP BID  - New episodes of hematemesis 5/6 s/p EGD without obvious source of bleeding w/ fresh blood in the oropharynx.   -- ENT evaluation, recommended TXA nebulizer if bleeding    # acute liver failure  - likely i/s/o alcohol use; AST / ALT 2:1 ratio. Bilirubin 28   - Last EtOH use: reportedly 4/20/24 per documentation from [employer]  - Smooth muscle ab 1:20   - MELD-Na on admission: 33, trend    - Variceal status: normal esophagus on EGD at OSH  - Per hepatology discontinue NAC  -- If pt improves, may be a candidate for transplant given high MELD per hepatology  - GI following, will appreciate recs  - max tylenol 2000 mg daily    #Bowel regimen  - Pt without BM, s/p relistor x2 i/s/o fent use; now off of fent  - Xray w/ nonobstructive bowel gas pattern  - c/w miralax, senna, lactulose  - s/p enema  - Reglan trial, now with BMs    #Diet  c/w tube feeds    ID  # Sepsis/septic shock  - POCUS w/ RLL consolidation, possible cause of infection  - UCx and BCx NGTD,   - s/p vanco and azithro, d/c iso neg legionella and mrsa  - previously on zosyn (4/27 - 5/3); see abx below  - Fungitell 200, can be falsely elevated. Will dc fluconazole given elevated LFTs  Pt n/ new fevers o/n 5/2, broaden to flagyl, cefepime, and vancomycin. Discontinue and monitor off of abx for now (-5/9)  - f/u infx work-up; RVP negative  -- UA, blood cx sputum cx    ENDO  # hypocortisoliemia  - 8 AM cortisol 8.7  - solucortef 50mg q8    #Low TSH  - TSH 0.10, T4 WNL    #hypoglycemia  likely i/s/o liver failure and not on tube feeds  - Now on feeds    #hypoglycemia  - likely i/s/o liver failure, AI, and full feeds being held for gut motility issue  - c/w d10; hold tube feeds i/s/o hematemesis    HEME/ONC  # bicytopenia (anemia and thrombocytopenia)  # elevated INR  - likely iso liver failure vs acute bleed at OSH  - s/p 4 u prbc and 2 u FFP at OSH. received 1 u prbc, 1 u plt, and 1 u FFP on 4/27  - shiley placement and exchange of lines  -- s/p additional 3u FFP now, 1u plt during shiley palcement, and 1u pRBC 4/29  - transfuse for hb <7, plt <50 for bleeding  - monitor fibrinogen QD  - Vitamin K x4 (4/28-4/29, 5/5 - 5/6)  - s/p cyro, 3u pRBC and 3u plt, 2u FFP 5/6    #Portal vein thrombosis  - Evidence of portal vein thrombosis on US abdomen  - hold AC at this time iso upper GIB    DERM  # jaundice  - likely iso elevated bilirubin, acute liver failure    #B/l LE/UE edema  - VA duplex negative    DVT: dc heparin given elevated INR and decreasing plt  FENGI: tube feeds  LDA: L IJ and radial a-line and R shiley  GTT: levo, vaso, ketamine, precedex  GOC: Overnight DNR reversed by family. Family updated today regarding updates

## 2024-05-09 NOTE — CHART NOTE - NSCHARTNOTEFT_GEN_A_CORE
: Suha Martinez PA-C    INDICATION: ARDS     PROCEDURE:  [X] LIMITED ECHO  [X] LIMITED CHEST  [ ] LIMITED RETROPERITONEAL  [ ] LIMITED ABDOMINAL  [ ] LIMITED DVT  [ ] NEEDLE GUIDANCE VASCULAR  [ ] NEEDLE GUIDANCE THORACENTESIS  [ ] NEEDLE GUIDANCE PARACENTESIS  [ ] NEEDLE GUIDANCE PERICARDIOCENTESIS  [ ] OTHER    FINDINGS:    Grossly normal LV cardiac systolic function with midly reduced RV systolic function and mildly dilated RV. Noted to have mild aortic regurgitation. No pericardial effusion. LVOT VTI 27.61. IVC 2.46. Scattered b-lines bilaterally to anterior chest wall.    INTERPRETATION:    Grossly normal LV cardiac systolic function with mildly reduced RV systolic function. Abnormal aeration pattern to lungs.

## 2024-05-09 NOTE — PROGRESS NOTE ADULT - PROBLEM SELECTOR PLAN 1
Pt. with LUBA in setting of hypotension and decompensated liver cirrhosis. Found to be anemic and thrombocytopenic. No obvious bleeding source on EGD. Patient developed ARDS due to liver failure and receiving multiple blood products, remains intubated. He was on Levo, Vaso, Octreotide, and Bumex gtts, currently only on Levo and Vaso. Was non-oliguric, but BUN/Cr continued to rise from 46/1.86 on admission to 70/2.55 on 4/29. Also was acidotic with pH 7.26. Urine studies significant for very low Na<20 suggestive of HRS as well. Despite adequate UOP initially, pt remained net positive and was in ARDS. Hence, CRRT initiated on 4/29/24 for fluid removal. Consent for CRRT was obtained from the father. Will continue CRRT as per discussion with MICU team and keep net even now. No issues with CRRT catheter or filter clotting overnight. Monitor BP and labs. Avoid nephrotoxins. Overall prognosis remains guarded.

## 2024-05-10 NOTE — PROGRESS NOTE ADULT - ASSESSMENT
43M with hx of alcohol use disorder, restless leg syndrome, PVD, anxiety/PTSD ( service), PAD, taking adderall at home, presented as a transfer from [facility] ED for acute decompensated liver and respiratory failure and possible ECMO eval.    NEURO  # encephalopathy  - Pt encephalopathic on admission likely metabolic vs hepatic encephalopathy (ammonia 94). Intubated for airway protection  - s/p paralytic 5/1  - currently sedated (on precedex and ketamine). wean off per ICU guidelines  - maintain RAAS -2, wean per ICU protocol;  - lactulose 5/4 and rifaximin 5/8     #Seizures   - no documented history of seizures, appeared to be having seizure like activity during rounds 4/27  - s/o veeg negative  - monitor for now    CARDS  # shock  - likely vasoplegia + sepsis (given fevers and leukocytosis), currently on levo and vaso, stable.  - wean vasopressors per ICU protocol  - maintain MAP 65-70  - abx as below  - TTE 4/27 EF 64%, LAE, mild AR, mild MR    #Aortic calcification/?Bicuspid aortic valve  - Pt with aortic calcification on echo w/ possible bicuspid valve. +systolic murmur  - Echo w/o vegetation    PULM  # ARDS  - P/F reported to be 70 at [facility], reflecting severe ARDS. Now improved to 107  -- Peak pressure 36 w/ Plateau 32; PEEP decreased to 10 and subsequently peak pressure 32 and plateau 29; modify vent setting per blood gas  - POCUS w/ RLL consolidation. ARDS likely i/s/o PNA vs pneumonitis from hematemesis  - maintain lung protective ventilation, wean per ICU protocol  - s/p proning with improvement in oxygenation. Pt now w/ improving O2 requirements and plateau pressure  - Airleak overnight 5/3, ETT changed 5/3  - duonebs q6    RENAL  # renal failure, anuria  - sCr on admit 1.86, baseline unclear. Now worsening 2.55. Pt with decreasing UO s/p bumex gtt and metolazone  - likely hepatorenal syndrome, vs ATN from shock  --- s/p albumin x2 days, on octreotide and levophed  - Renal protective measures: Please renally dose all medications; Avoid nephrotoxic medications (NSAIDS, IV contrast); Avoid ACEi and ARBs in the setting of LUBA; Maintain MAP >65;   - Nephro c/s for CVVH; will continue w/ CVVH for now given pressor requirements and poor UO. Keep net even  - monitor strict I&O  - Lin removed, monitor UO    #hyponatremia  Likely i/s/o hypotonic fluids and d10 gtt; now improved  - Monitor BMPs    GI  # hematemesis  - EGD at OSH noted blood clots in oropharynx, no obvious bleeding source, normal esophagus  - no epistaxis noted on exam  - 4/28 increased bloody output from OGT, improved  - Hepatology following, no plan for EGD  - protonix 40 mg IVP BID  - New episodes of hematemesis 5/6 s/p EGD without obvious source of bleeding w/ fresh blood in the oropharynx.   -- ENT evaluation, recommended TXA nebulizer if rebleeding    # acute liver failure  - likely i/s/o alcohol use; AST / ALT 2:1 ratio. Bilirubin 28   - Last EtOH use: reportedly 4/20/24 per documentation from [employer]  - Smooth muscle ab 1:20   - MELD-Na on admission: 33, trend    - Variceal status: normal esophagus on EGD at OSH  - Per hepatology discontinue NAC  -- If pt improves, may be a candidate for transplant given high MELD per hepatology  - GI following, will appreciate recs  - max tylenol 2000 mg daily    #Bowel regimen  - Pt without BM, s/p relistor x2 i/s/o fent use; now off of fent  - Xray w/ nonobstructive bowel gas pattern  - c/w miralax, senna, lactulose  - s/p enema  - Reglan trial, now with BMs    #Diet  c/w tube feeds    ID  # Sepsis/septic shock  - POCUS w/ RLL consolidation, possible cause of infection  - UCx and BCx NGTD,   - s/p vanco and azithro, d/c iso neg legionella and mrsa  - previously on zosyn (4/27 - 5/3); see abx below  - Fungitell 200, can be falsely elevated. Will dc fluconazole given elevated LFTs  Pt n/ new fevers o/n 5/2, broaden to flagyl, cefepime, and vancomycin. Discontinue and monitor off of abx for now (-5/9)  - f/u infx work-up; RVP negative  -- UA, blood cx sputum cx    ENDO  # hypocortisoliemia  - 8 AM cortisol 8.7  - solucortef 50mg q8    #Low TSH  - TSH 0.10, T4 WNL    #hypoglycemia  likely i/s/o liver failure and not on tube feeds  - Now on feeds    #hypoglycemia  - likely i/s/o liver failure, AI, and full feeds being held for gut motility issue  - c/w d10; hold tube feeds i/s/o hematemesis    HEME/ONC  # bicytopenia (anemia and thrombocytopenia)  # elevated INR  - likely iso liver failure vs acute bleed at OSH  - s/p 4 u prbc and 2 u FFP at OSH. received 1 u prbc, 1 u plt, and 1 u FFP on 4/27  - shiley placement and exchange of lines  -- s/p additional 3u FFP now, 1u plt during shiley palcement, and 1u pRBC 4/29  - transfuse for hb <7, plt <50 for bleeding  - monitor fibrinogen QD  - Vitamin K x4 (4/28-4/29, 5/5 - 5/6)  - s/p cyro, 3u pRBC and 3u plt, 2u FFP 5/6    #Portal vein thrombosis  - Evidence of portal vein thrombosis on US abdomen  - hold AC at this time iso upper GIB    DERM  # jaundice  - likely iso elevated bilirubin, acute liver failure    #B/l LE/UE edema  - VA duplex negative    DVT: dc heparin given elevated INR and decreasing plt  FENGI: tube feeds  LDA: L IJ and radial a-line and R shiley  GTT: levo, vaso, ketamine, precedex  GOC: Overnight DNR reversed by family. Family updated today regarding updates   43M with hx of alcohol use disorder, restless leg syndrome, PVD, anxiety/PTSD ( service), PAD, taking adderall at home, presented as a transfer from [facility] ED for acute decompensated liver and respiratory failure and possible ECMO eval.    NEURO  # encephalopathy  - Pt encephalopathic on admission likely metabolic vs hepatic encephalopathy (ammonia 94). Intubated for airway protection  - s/p paralytic 5/1  - currently sedated (on precedex and ketamine). wean off per ICU guidelines  - maintain RAAS -2, wean per ICU protocol;  - lactulose 5/4 and rifaximin 5/8     #Seizures   - no documented history of seizures, appeared to be having seizure like activity during rounds 4/27  - s/o veeg negative  - monitor for now    CARDS  # shock  - likely vasoplegia + sepsis (given fevers and leukocytosis), currently on levo and vaso, stable.  - wean vasopressors per ICU protocol  - maintain MAP 65-70  - abx as below  - TTE 4/27 EF 64%, LAE, mild AR, mild MR    #Aortic calcification/?Bicuspid aortic valve  - Pt with aortic calcification on echo w/ possible bicuspid valve. +systolic murmur  - Echo w/o vegetation    PULM  # ARDS  - P/F reported to be 70 at [facility], reflecting severe ARDS. Now improved to 107  -- Peak pressure 36 w/ Plateau 32; PEEP decreased to 10 and subsequently peak pressure 32 and plateau 29; modify vent setting per blood gas  - POCUS w/ RLL consolidation. ARDS likely i/s/o PNA vs pneumonitis from hematemesis  - maintain lung protective ventilation, wean per ICU protocol  - s/p proning with improvement in oxygenation. Pt now w/ improving O2 requirements and plateau pressure  - Airleak overnight 5/3, ETT changed 5/3  - duonebs q6    RENAL  # renal failure, anuria  - sCr on admit 1.86, baseline unclear. Now worsening 2.55. Pt with decreasing UO s/p bumex gtt and metolazone  - likely hepatorenal syndrome, vs ATN from shock  --- s/p albumin x2 days, on octreotide and levophed  - Renal protective measures: Please renally dose all medications; Avoid nephrotoxic medications (NSAIDS, IV contrast); Avoid ACEi and ARBs in the setting of LUBA; Maintain MAP >65;   - Nephro c/s for CVVH; will continue w/ CVVH for now given pressor requirements and poor UO. Keep net even  - monitor strict I&O  - Lin removed, monitor UO    #hyponatremia  Likely i/s/o hypotonic fluids and d10 gtt; now improved  - Monitor BMPs    GI  # hematemesis  - EGD at OSH noted blood clots in oropharynx, no obvious bleeding source, normal esophagus  - no epistaxis noted on exam  - 4/28 increased bloody output from OGT, improved  - Hepatology following, no plan for EGD  - protonix 40 mg IVP BID  - New episodes of hematemesis 5/6 s/p EGD without obvious source of bleeding w/ fresh blood in the oropharynx.   -- ENT evaluation, recommended TXA nebulizer if rebleeding    # acute liver failure  - likely i/s/o alcohol use; AST / ALT 2:1 ratio. Bilirubin 28   - Last EtOH use: reportedly 4/20/24 per documentation from [employer]  - Smooth muscle ab 1:20   - MELD-Na on admission: 33, trend    - Variceal status: normal esophagus on EGD at OSH  - Per hepatology discontinue NAC  -- If pt improves, may be a candidate for transplant given high MELD per hepatology  - GI following, will appreciate recs  - max tylenol 2000 mg daily    #Bowel regimen  - Pt without BM, s/p relistor x2 i/s/o fent use; now off of fent  - Xray w/ nonobstructive bowel gas pattern  - c/w miralax, senna, lactulose  - s/p enema  - Reglan trial, now with BMs  - Obtain abd xray  - Increase miralax TID and add reglan TID    #Diet  c/w tube feeds    ID  # Sepsis/septic shock  - POCUS w/ RLL consolidation, possible cause of infection  - UCx and BCx NGTD,   - s/p vanco and azithro, d/c iso neg legionella and mrsa  - previously on zosyn (4/27 - 5/3); see abx below  - Fungitell 200, can be falsely elevated. Will dc fluconazole given elevated LFTs  Pt n/ new fevers o/n 5/2, broaden to flagyl, cefepime, and vancomycin. Discontinue and monitor off of abx for now (-5/9)  - f/u infx work-up; RVP negative  -- UA, blood cx sputum cx    ENDO  # hypocortisoliemia  - 8 AM cortisol 8.7  - solucortef 50mg q12    #Low TSH  - TSH 0.10, T4 WNL    #hypoglycemia  likely i/s/o liver failure and not on tube feeds  - Now on feeds    #hypoglycemia  - likely i/s/o liver failure, AI, and full feeds being held for gut motility issue  - c/w d10; hold tube feeds i/s/o hematemesis    HEME/ONC  # bicytopenia (anemia and thrombocytopenia)  # elevated INR  - likely iso liver failure vs acute bleed at OSH  - s/p 4 u prbc and 2 u FFP at OSH. received 1 u prbc, 1 u plt, and 1 u FFP on 4/27  - shiley placement and exchange of lines  -- s/p additional 3u FFP now, 1u plt during shiley palcement, and 1u pRBC 4/29  - transfuse for hb <7, plt <50 for bleeding  - monitor fibrinogen QD  - Vitamin K x4 (4/28-4/29, 5/5 - 5/6)  - s/p cyro, 3u pRBC and 3u plt, 2u FFP 5/6    #Portal vein thrombosis  - Evidence of portal vein thrombosis on US abdomen  - hold AC at this time iso upper GIB    DERM  # jaundice  - likely iso elevated bilirubin, acute liver failure    #B/l LE/UE edema  - VA duplex negative    DVT: dc heparin given elevated INR and decreasing plt  FENGI: tube feeds  LDA: L IJ and radial a-line and R shiley  GTT: levo, vaso, ketamine, precedex  GOC: Overnight DNR reversed by family. Family updated today regarding updates

## 2024-05-10 NOTE — PROGRESS NOTE ADULT - PROBLEM SELECTOR PLAN 1
Pt. with LUBA in setting of hypotension and decompensated liver cirrhosis. Found to be anemic and thrombocytopenic. No obvious bleeding source on EGD. Patient developed ARDS due to liver failure and receiving multiple blood products, remains intubated. He was on Levo, Vaso, Octreotide, and Bumex gtts, currently only on Levo and Vaso. Was non-oliguric, but BUN/Cr continued to rise from 46/1.86 on admission to 70/2.55 on 4/29. Also was acidotic with pH 7.26. Urine studies significant for very low Na<20 suggestive of HRS as well. Despite adequate UOP initially, pt remained net positive and was in ARDS. Hence, CRRT initiated on 4/29/24 for fluid removal. Consent for CRRT was obtained from the father. Will continue CRRT as per discussion with MICU team and keep net even now. Noted to be hypokalemic and hypophosphatemic, dialysis bags adjusted accordingly. No issues with CRRT catheter or filter clotting overnight. Monitor BP and labs. Avoid nephrotoxins. Overall prognosis remains guarded.    Recommendations are preliminary until attending attestation.    Jolanta Mata, PGY-4  Nephrology Fellow  MS TEAMS preferred  (After 5pm or on weekends, please contact the fellow on call).

## 2024-05-10 NOTE — PROGRESS NOTE ADULT - PROBLEM SELECTOR PROBLEM 1
LUBA (acute kidney injury)
LUBA (acute kidney injury)
LBUA (acute kidney injury)
LUBA (acute kidney injury)

## 2024-05-10 NOTE — CHART NOTE - NSCHARTNOTEFT_GEN_A_CORE
: Suha Martinez PA-C    INDICATION: ARDS    PROCEDURE:  [X] LIMITED ECHO  [X] LIMITED CHEST  [ ] LIMITED RETROPERITONEAL  [X] LIMITED ABDOMINAL  [ ] LIMITED DVT  [ ] NEEDLE GUIDANCE VASCULAR  [ ] NEEDLE GUIDANCE THORACENTESIS  [ ] NEEDLE GUIDANCE PARACENTESIS  [ ] NEEDLE GUIDANCE PERICARDIOCENTESIS  [ ] OTHER    FINDINGS:    Grossly normal left ventricular systolic function. No pericardial effusion noted. LVOT VTI 19.9 cm. IVC 2.1 cm. Left basilar b-lines with pleural effusion and small consolidation. Dilated loops of bowel with peristalsis and visualized stool.    INTERPRETATION:    Grossly normal left ventricular systolic function with abnormal aeration pattern to lungs and noted left basilar PLEF

## 2024-05-10 NOTE — PROGRESS NOTE ADULT - PROVIDER SPECIALTY LIST ADULT
Hepatology
MICU
Nephrology
Nephrology
Hepatology
MICU
MICU
Nephrology
MICU
Hepatology
MICU
MICU
Nephrology
MICU
MICU
Nephrology

## 2024-05-10 NOTE — PROGRESS NOTE ADULT - ATTENDING COMMENTS
MOSF including respiratory failure, liver failure and ATN  oliguric. continue CRRT   GOC discussions noted

## 2024-05-10 NOTE — PROGRESS NOTE ADULT - CRITICAL CARE ATTENDING COMMENT
Critically ill patient requiring frequent bedside visits with therapy changes.  ongoing goc
Critically ill patient requiring frequent bedside visits with therapy changes.
Medical management as above, review of results/records, discussion with patient and primary team, documentation.
Critically ill patient requiring frequent bedside visits with therapy changes.
Critically ill patient requiring frequent bedside visits with therapy changes.
Medical management as above, review of results/records, discussion with patient and primary team, documentation.
Critically ill patient requiring frequent bedside visits with therapy changes.

## 2024-05-10 NOTE — PROGRESS NOTE ADULT - REASON FOR ADMISSION
ARDS
ECMO eval
ARDS
ECMO eval
ARDS
ARDS
ECMO eval

## 2024-05-10 NOTE — PROGRESS NOTE ADULT - SUBJECTIVE AND OBJECTIVE BOX
Carlos Ponce  PGY-3 | Internal Medicine      INTERVAL HPI/OVERNIGHT EVENTS: JENN    SUBJECTIVE: Patient seen and examined at bedside. Patient is intubated and sedated    OBJECTIVE:    VITAL SIGNS:  ICU Vital Signs Last 24 Hrs  T(C): 37 (10 May 2024 04:00), Max: 37.6 (09 May 2024 20:00)  T(F): 98.6 (10 May 2024 04:00), Max: 99.7 (09 May 2024 20:00)  HR: 79 (10 May 2024 06:00) (79 - 95)  BP: --  BP(mean): --  ABP: 104/37 (10 May 2024 06:00) (104/37 - 147/61)  ABP(mean): 57 (10 May 2024 06:00) (57 - 88)  RR: 22 (10 May 2024 06:00) (21 - 24)  SpO2: 99% (10 May 2024 06:00) (90% - 100%)    O2 Parameters below as of 10 May 2024 06:00  Patient On (Oxygen Delivery Method): ventilator    O2 Concentration (%): 50      Mode: AC/ CMV (Assist Control/ Continuous Mandatory Ventilation), RR (machine): 22, TV (machine): 430, FiO2: 50, PEEP: 5, ITime: 0.65, MAP: 11, PIP: 25    05-09 @ 07:01  -  05-10 @ 07:00  --------------------------------------------------------  IN: 3686 mL / OUT: 3979 mL / NET: -293 mL      CAPILLARY BLOOD GLUCOSE          PHYSICAL EXAM:    GENERAL: intubated, sedated  HENMT: Atraumatic, moist mucous membranes, no oropharyngeal exudates or vesicles, uvula is midline EYES: scleral icterus, PERRL,  HEART: RRR, S1/S2, no murmur/gallops/rubs  RESPIRATORY: Clear to auscultation bilaterally, no wheezes/rhonchi/rales  ABDOMEN: soft, nontender, protuberant  EXTREMITIES: 1+ lower extremity edema, +2 radial pulses b/l  NEURO:  intubated, sedated   Heme/LYMPH: ecchymosis on RLE/calf area  SKIN: jaundiced      MEDICATIONS:  MEDICATIONS  (STANDING):  albuterol/ipratropium for Nebulization 3 milliLiter(s) Nebulizer every 6 hours  chlorhexidine 0.12% Liquid 15 milliLiter(s) Oral Mucosa every 12 hours  chlorhexidine 2% Cloths 1 Application(s) Topical daily  CRRT Treatment    <Continuous>  dexMEDEtomidine Infusion 0.3 MICROgram(s)/kG/Hr (6.91 mL/Hr) IV Continuous <Continuous>  folic acid 1 milliGRAM(s) Oral daily  hydrocortisone sodium succinate Injectable 50 milliGRAM(s) IV Push every 8 hours  lactulose Syrup 30 Gram(s) Oral three times a day  multivitamin/minerals/iron Oral Solution (CENTRUM) 15 milliLiter(s) Oral daily  norepinephrine Infusion 0.05 MICROgram(s)/kG/Min (4.32 mL/Hr) IV Continuous <Continuous>  pantoprazole  Injectable 40 milliGRAM(s) IV Push two times a day  petrolatum Ophthalmic Ointment 1 Application(s) Both EYES two times a day  Phoxillum Filtration BK 4 / 2.5 5000 milliLiter(s) (200 mL/Hr) CRRT <Continuous>  Phoxillum Filtration BK 4 / 2.5 5000 milliLiter(s) (2500 mL/Hr) CRRT <Continuous>  phytonadione  IVPB 10 milliGRAM(s) IV Intermittent daily  polyethylene glycol 3350 17 Gram(s) Oral two times a day  PrismaSOL Filtration BGK 4 / 2.5 5000 milliLiter(s) (2000 mL/Hr) CRRT <Continuous>  rifAXIMin 550 milliGRAM(s) Oral two times a day  senna Syrup 15 milliLiter(s) Oral at bedtime  thiamine 100 milliGRAM(s) Oral daily  vasopressin Infusion 0.04 Unit(s)/Min (6 mL/Hr) IV Continuous <Continuous>    MEDICATIONS  (PRN):      ALLERGIES:  Allergies    Allergy Status Unknown    Intolerances        LABS:                        7.9    14.96 )-----------( 19       ( 10 May 2024 05:00 )             22.0     05-10    136  |  101  |  20  ----------------------------<  191<H>  3.7   |  21<L>  |  0.52    Ca    8.8      10 May 2024 05:00  Phos  2.6     05-10  Mg     2.10     05-10    TPro  6.2  /  Alb  3.4  /  TBili  38.7<H>  /  DBili  x   /  AST  156<H>  /  ALT  121<H>  /  AlkPhos  93  05-10    PT/INR - ( 10 May 2024 05:00 )   PT: 54.4 sec;   INR: 5.05 ratio         PTT - ( 10 May 2024 05:00 )  PTT:47.2 sec  Urinalysis Basic - ( 10 May 2024 05:00 )    Color: x / Appearance: x / SG: x / pH: x  Gluc: 191 mg/dL / Ketone: x  / Bili: x / Urobili: x   Blood: x / Protein: x / Nitrite: x   Leuk Esterase: x / RBC: x / WBC x   Sq Epi: x / Non Sq Epi: x / Bacteria: x        RADIOLOGY & ADDITIONAL TESTS: Reviewed.

## 2024-05-10 NOTE — PROGRESS NOTE ADULT - ATTENDING SUPERVISION STATEMENT
Fellow
Resident
Fellow
Resident
Fellow
Fellow
Resident
Fellow
Resident
Resident
Fellow
Resident
Fellow

## 2024-05-10 NOTE — CHART NOTE - NSCHARTNOTESELECT_GEN_ALL_CORE
Code Status
Death Note/Event Note
Follow Up/Nutrition Services
POCUS
POCUS/Event Note
POCUS/Event Note
Follow Up/Nutrition Services
Hepatology/Event Note
Hepatology/Event Note
POCUS
POCUS
POCUS/Event Note

## 2024-05-10 NOTE — PROGRESS NOTE ADULT - ATTENDING COMMENTS
43 M with etoh use d/o, PTSD went to [place] for GI bleed c/w acute decompensated liver failure and acute hypoxemic respiratory failure with acute UGIB requiring intubation, tx here, course c/b LUBA requiring dialysis, shock    Patient still with liver failure  not able to meaningfully wake up  still requiring HD  still with acute metabolic encephalopathy due to hyperammonemia (worsening today)    # acute hypoxemic respiratory failure  # acute metabolic encephalopathy  # hepatic encephalopathy  # LUBA  # acute UGIB  - c/w full vent support  - wean sedation as tolerated, ideally off precedex today  - net even with CRRT  - f/u sputum culture  - on steroids for AI, will taper to q12h today   - just ppi for ugib per hepatology  - reglan for bowel motility, c/w bowel regimen for hyperammonemia  - MELD score worsening, overall poor prognosis. Ongoing GOC with brother, dad.

## 2024-05-10 NOTE — PROGRESS NOTE ADULT - NUTRITIONAL ASSESSMENT
This patient has been assessed with a concern for Malnutrition and has been determined to have a diagnosis/diagnoses of Severe protein-calorie malnutrition.    This patient is being managed with:   Diet NPO with Tube Feed-  Tube Feeding Modality: Orogastric  Vital High Protein (VITALHP)  Total Volume for 24 Hours (mL): 180  Continuous  Starting Tube Feed Rate {mL per Hour}: 10     Every 4 hours  Until Goal Tube Feed Rate (mL per Hour): 10  Tube Feed Duration (in Hours): 18  Tube Feed Start Time: 11:00  Tube Feed Stop Time: 05:00  Liquid Protein Supplement     Qty per Day:  1  Entered: May  3 2024 10:03AM  
This patient has been assessed with a concern for Malnutrition and has been determined to have a diagnosis/diagnoses of Severe protein-calorie malnutrition.    This patient is being managed with:   Diet NPO with Tube Feed-  Tube Feeding Modality: Nasogastric  Peptamen 1.5 (PEPTAMEN1.5RTH)  Total Volume for 24 Hours (mL): 1080  Continuous  Starting Tube Feed Rate {mL per Hour}: 10  Increase Tube Feed Rate by (mL): 10     Every 4 hours  Until Goal Tube Feed Rate (mL per Hour): 60  Tube Feed Duration (in Hours): 18  Tube Feed Start Time: 11:00  Tube Feed Stop Time: 05:00  Liquid Protein Supplement     Qty per Day:  3  Entered: May  8 2024  9:58AM  
This patient has been assessed with a concern for Malnutrition and has been determined to have a diagnosis/diagnoses of Severe protein-calorie malnutrition.    This patient is being managed with:   Diet NPO with Tube Feed-  Tube Feeding Modality: Nasogastric  Peptamen 1.5 (PEPTAMEN1.5RTH)  Total Volume for 24 Hours (mL): 1440  Continuous  Starting Tube Feed Rate {mL per Hour}: 10  Increase Tube Feed Rate by (mL): 10     Every 4 hours  Until Goal Tube Feed Rate (mL per Hour): 60  Tube Feed Duration (in Hours): 24  Tube Feed Start Time: 11:00  Tube Feed Stop Time: 05:00  Entered: May  7 2024 11:45PM  
This patient has been assessed with a concern for Malnutrition and has been determined to have a diagnosis/diagnoses of Severe protein-calorie malnutrition.    This patient is being managed with:   Diet NPO with Tube Feed-  Tube Feeding Modality: Orogastric  Vital High Protein (VITALHP)  Total Volume for 24 Hours (mL): 1080  Continuous  Starting Tube Feed Rate {mL per Hour}: 10  Increase Tube Feed Rate by (mL): 10     Every 4 hours  Until Goal Tube Feed Rate (mL per Hour): 60  Tube Feed Duration (in Hours): 18  Tube Feed Start Time: 11:00  Tube Feed Stop Time: 05:00  Liquid Protein Supplement     Qty per Day:  1  Entered: Apr 30 2024  3:30PM  
This patient has been assessed with a concern for Malnutrition and has been determined to have a diagnosis/diagnoses of Severe protein-calorie malnutrition.    This patient is being managed with:   Diet NPO with Tube Feed-  Tube Feeding Modality: Nasogastric  Peptamen 1.5 (PEPTAMEN1.5RTH)  Total Volume for 24 Hours (mL): 1080  Continuous  Starting Tube Feed Rate {mL per Hour}: 10  Increase Tube Feed Rate by (mL): 10     Every 4 hours  Until Goal Tube Feed Rate (mL per Hour): 60  Tube Feed Duration (in Hours): 18  Tube Feed Start Time: 11:00  Tube Feed Stop Time: 05:00  Liquid Protein Supplement     Qty per Day:  3  Entered: May  8 2024  9:58AM  
This patient has been assessed with a concern for Malnutrition and has been determined to have a diagnosis/diagnoses of Severe protein-calorie malnutrition.    This patient is being managed with:   Diet NPO with Tube Feed-  Tube Feeding Modality: Orogastric  Vital High Protein (VITALHP)  Total Volume for 24 Hours (mL): 1080  Continuous  Starting Tube Feed Rate {mL per Hour}: 10  Increase Tube Feed Rate by (mL): 10     Every 4 hours  Until Goal Tube Feed Rate (mL per Hour): 60  Tube Feed Duration (in Hours): 18  Tube Feed Start Time: 11:00  Tube Feed Stop Time: 05:00  Liquid Protein Supplement     Qty per Day:  1  Entered: Apr 30 2024  3:30PM  
This patient has been assessed with a concern for Malnutrition and has been determined to have a diagnosis/diagnoses of Severe protein-calorie malnutrition.    This patient is being managed with:   Diet NPO with Tube Feed-  Tube Feeding Modality: Orogastric  Vital High Protein (VITALHP)  Total Volume for 24 Hours (mL): 1080  Continuous  Starting Tube Feed Rate {mL per Hour}: 10  Increase Tube Feed Rate by (mL): 10     Every 4 hours  Until Goal Tube Feed Rate (mL per Hour): 60  Tube Feed Duration (in Hours): 18  Tube Feed Start Time: 11:00  Tube Feed Stop Time: 05:00  Liquid Protein Supplement     Qty per Day:  1  Entered: Apr 30 2024  3:30PM  
This patient has been assessed with a concern for Malnutrition and has been determined to have a diagnosis/diagnoses of Severe protein-calorie malnutrition.    This patient is being managed with:   Diet NPO-  Entered: Apr 27 2024  4:12AM  
This patient has been assessed with a concern for Malnutrition and has been determined to have a diagnosis/diagnoses of Severe protein-calorie malnutrition.    This patient is being managed with:   Diet NPO with Tube Feed-  Tube Feeding Modality: Orogastric  Vital High Protein (VITALHP)  Total Volume for 24 Hours (mL): 180  Continuous  Starting Tube Feed Rate {mL per Hour}: 10     Every 4 hours  Until Goal Tube Feed Rate (mL per Hour): 10  Tube Feed Duration (in Hours): 18  Tube Feed Start Time: 11:00  Tube Feed Stop Time: 05:00  Liquid Protein Supplement     Qty per Day:  1  Entered: May  3 2024 10:03AM  
This patient has been assessed with a concern for Malnutrition and has been determined to have a diagnosis/diagnoses of Severe protein-calorie malnutrition.    This patient is being managed with:   Diet NPO with Tube Feed-  Tube Feeding Modality: Orogastric  Vital High Protein (VITALHP)  Total Volume for 24 Hours (mL): 180  Continuous  Starting Tube Feed Rate {mL per Hour}: 10     Every 4 hours  Until Goal Tube Feed Rate (mL per Hour): 10  Tube Feed Duration (in Hours): 18  Tube Feed Start Time: 11:00  Tube Feed Stop Time: 05:00  Liquid Protein Supplement     Qty per Day:  1  Entered: May  3 2024 10:03AM  
This patient has been assessed with a concern for Malnutrition and has been determined to have a diagnosis/diagnoses of Severe protein-calorie malnutrition.    This patient is being managed with:   Diet NPO-  Entered: Apr 27 2024  4:12AM  
This patient has been assessed with a concern for Malnutrition and has been determined to have a diagnosis/diagnoses of Severe protein-calorie malnutrition.    This patient is being managed with:   Diet NPO-  Entered: May  6 2024  1:17PM

## 2024-05-10 NOTE — PROGRESS NOTE ADULT - SUBJECTIVE AND OBJECTIVE BOX
[facility] Division of Kidney Diseases & Hypertension  FOLLOW UP NOTE  [phone]--------------------------------------------------------------------------------    Reason for consult: Oliguric LUBA on CRRT      24 hour events/subjective: Pt remains critically ill, intubated and sedated, on pressors. Remains anuric and on CRRT.      PAST HISTORY  --------------------------------------------------------------------------------  No significant changes to PMH, PSH, FHx, SHx, unless otherwise noted    ALLERGIES & MEDICATIONS  --------------------------------------------------------------------------------  Allergies    Allergy Status Unknown      Standing Inpatient Medications  albuterol/ipratropium for Nebulization 3 milliLiter(s) Nebulizer every 6 hours  chlorhexidine 0.12% Liquid 15 milliLiter(s) Oral Mucosa every 12 hours  chlorhexidine 2% Cloths 1 Application(s) Topical daily  CRRT Treatment    <Continuous>  dexMEDEtomidine Infusion 0.3 MICROgram(s)/kG/Hr IV Continuous <Continuous>  folic acid 1 milliGRAM(s) Oral daily  hydrocortisone sodium succinate Injectable 50 milliGRAM(s) IV Push every 8 hours  lactulose Syrup 30 Gram(s) Oral three times a day  multivitamin/minerals/iron Oral Solution (CENTRUM) 15 milliLiter(s) Oral daily  norepinephrine Infusion 0.05 MICROgram(s)/kG/Min IV Continuous <Continuous>  pantoprazole  Injectable 40 milliGRAM(s) IV Push two times a day  petrolatum Ophthalmic Ointment 1 Application(s) Both EYES two times a day  Phoxillum Filtration BK 4 / 2.5 5000 milliLiter(s) CRRT <Continuous>  Phoxillum Filtration BK 4 / 2.5 5000 milliLiter(s) CRRT <Continuous>  phytonadione  IVPB 10 milliGRAM(s) IV Intermittent daily  polyethylene glycol 3350 17 Gram(s) Oral <User Schedule>  PrismaSOL Filtration BGK 4 / 2.5 5000 milliLiter(s) CRRT <Continuous>  rifAXIMin 550 milliGRAM(s) Oral two times a day  senna Syrup 15 milliLiter(s) Oral at bedtime  thiamine 100 milliGRAM(s) Oral daily  vasopressin Infusion 0.04 Unit(s)/Min IV Continuous <Continuous>    PRN Inpatient Medications      REVIEW OF SYSTEMS  --------------------------------------------------------------------------------  Unable to obtain, pt intubated/sedated    VITALS/PHYSICAL EXAM  --------------------------------------------------------------------------------  T(C): 37 (05-10-24 @ 04:00), Max: 37.6 (05-09-24 @ 20:00)  HR: 79 (05-10-24 @ 06:00) (79 - 95)  BP: --  RR: 22 (05-10-24 @ 06:00) (21 - 24)  SpO2: 99% (05-10-24 @ 06:00) (90% - 100%)  Wt(kg): --        05-09-24 @ 07:01  -  05-10-24 @ 07:00  --------------------------------------------------------  IN: 3686 mL / OUT: 3979 mL / NET: -293 mL      Physical Exam:                       Gen: intubated/sedated                       Pulm: coarse breath sounds, vented                       CV: +S1S2                       Abd: +BS, soft                       : geronimo                       Extremities: ++bilateral LE edema                       Neuro: sedated                       Dialysis access: RIJ non-tunneled cath    LABS/STUDIES  --------------------------------------------------------------------------------              7.9    14.96 >-----------<  19       [05-10-24 @ 05:00]              22.0     136  |  101  |  20  ----------------------------<  191      [05-10-24 @ 05:00]  3.7   |  21  |  0.52        Ca     8.8     [05-10-24 @ 05:00]      iCa    1.24     [05-10 @ 05:00]      Mg     2.10     [05-10-24 @ 05:00]      Phos  2.6     [05-10-24 @ 05:00]    TPro  6.2  /  Alb  3.4  /  TBili  38.7  /  DBili  x   /  AST  156  /  ALT  121  /  AlkPhos  93  [05-10-24 @ 05:00]    PT/INR: PT 54.4 , INR 5.05       [05-10-24 @ 05:00]  PTT: 47.2       [05-10-24 @ 05:00]      Creatinine Trend:  SCr 0.52 [05-10 @ 05:00]  SCr 0.52 [05-09 @ 23:00]  SCr 0.51 [05-09 @ 17:00]  SCr 0.56 [05-09 @ 11:00]  SCr 0.61 [05-09 @ 05:00]    Urinalysis - [05-10-24 @ 05:00]      Color  / Appearance  / SG  / pH       Gluc 191 / Ketone   / Bili  / Urobili        Blood  / Protein  / Leuk Est  / Nitrite       RBC  / WBC  / Hyaline  / Gran  / Sq Epi  / Non Sq Epi  / Bacteria       Lipid: chol --, , HDL --, LDL --      [05-06-24 @ 12:14]      C4 Complement 6      [05-08-24 @ 03:44]

## 2024-05-10 NOTE — CHART NOTE - NSCHARTNOTEFT_GEN_A_CORE
SANGEETA PAOLO  [id number]    Death Note    Called to see the patient for unresponsiveness.   On exam, patient is unresponsive to verbal or noxious stimuli.  Pupils are fixed and dilated.  No spontaneous breathing.  Absent heart and breath sounds.  No palpable carotid and radial pulses.  Absent peripheral pulses.  Patient pronounced  at 1:20PM. Dr Murphy  notified. Family notified: at bedside, 1:20PM  Autopsy: declined.      Chris Joseph MD  Emergency & Internal Medicine PGY-2

## 2024-05-10 NOTE — DISCHARGE NOTE FOR THE EXPIRED PATIENT - HOSPITAL COURSE
43M with hx of alcohol use disorder, restless leg syndrome, PVD, anxiety/PTSD ( service), PAD, taking adderall at home, presented as a transfer from [facility] ED for acute decompensated liver and respiratory failure and possible ECMO eval. Patient intubated on arrival, history obtained on chart review from paperwork received from [facility]. He initially presented on 4/25 for chest pain and was worried that he was having a heart attack, which subsided by the time he was evaluated at [facility]. He also fell on the day of presentation, but was unable to provide specific details on how he fell, stating that he just fell. Unclear hx of jaundice, but was noticed by someone from his [facility] who also suggested patient undergo med eval. ROS also notable for hemorrhoids and occasional BRBPR. Found to have Hb 4.9, received 4 units pRBC and 2 u FFP. He was initially placed on HFNC with worsening hypoxemia with plan for intubation. However had an episode of hematemesis and was immediately intubated for airway protection. Noted to have active bleeding during intubation. GI performed EGD with moderate amount of blood clots in oropharynx, no obvious bleeding source, normal esophagus, portal hypertensive gastropathy, hematin in gastric fundus. On laboratory evaluation, patient noted to have p/f 70 and high peak pressures with c/f ARDS. Patient was transferred to [facility] for further eval and management.     At [facility] pt admitted to MICU for severe ARDS. Pt started on broad spectrum antibiotics. Due to worsening O2 requirement, pt was briefly proned. Subsequently, pt oxygen saturation improved. Pt course was complicated by AoC liver failure and ARF likely 2/2 HRS. Pt initiated on CVVH. Pt liver failure continued to worsened. Pt was attempted to be weaned off sedation, but pt without any mental status. Family decided to pursue comfort measures for the patient on 5/10. Pt was palliative extuabted and initiated on comfort care. Pt passed away at 1:20PM on 5/10/2024

## 2024-05-14 LAB — FIBRINOGEN AG PPP IA-MCNC: 271 MG/DL — SIGNIFICANT CHANGE UP (ref 206–478)

## 2024-05-25 LAB
CULTURE RESULTS: SIGNIFICANT CHANGE UP
SPECIMEN SOURCE: SIGNIFICANT CHANGE UP

## 2024-05-29 LAB
CULTURE RESULTS: ABNORMAL
SPECIMEN SOURCE: SIGNIFICANT CHANGE UP

## 2024-06-12 LAB
CULTURE RESULTS: SIGNIFICANT CHANGE UP
SPECIMEN SOURCE: SIGNIFICANT CHANGE UP

## 2024-06-19 LAB
CULTURE RESULTS: SIGNIFICANT CHANGE UP
SPECIMEN SOURCE: SIGNIFICANT CHANGE UP

## (undated) DEVICE — PACK IV START WITH CHG

## (undated) DEVICE — GOWN LG

## (undated) DEVICE — UNDERPAD LINEN SAVER 17 X 24"

## (undated) DEVICE — ELCTR ECG CONDUCTIVE ADHESIVE

## (undated) DEVICE — DRSG BANDAID 0.75X3"

## (undated) DEVICE — CLAMP BX HOT RAD JAW 3

## (undated) DEVICE — TUBING IV SET GRAVITY 3Y 100" MACRO

## (undated) DEVICE — BIOPSY FORCEP RADIAL JAW 4 STANDARD WITH NEEDLE

## (undated) DEVICE — BASIN EMESIS 10IN GRADUATED MAUVE

## (undated) DEVICE — LUBRICATING JELLY HR ONE SHOT 3G

## (undated) DEVICE — CONTAINER FORMALIN 80ML YELLOW

## (undated) DEVICE — TUBING MEDI-VAC W MAXIGRIP CONNECTORS 1/4"X6'

## (undated) DEVICE — DENTURE CUP PINK

## (undated) DEVICE — BIOPSY FORCEP COLD DISP

## (undated) DEVICE — DRSG CURITY GAUZE SPONGE 4 X 4" 12-PLY NON-STERILE

## (undated) DEVICE — SALIVA EJECTOR (BLUE)

## (undated) DEVICE — BITE BLOCK ADULT 20 X 27MM (GREEN)

## (undated) DEVICE — DRSG 2X2

## (undated) DEVICE — CATH IV SAFE BC 22G X 1" (BLUE)